# Patient Record
Sex: FEMALE | Race: WHITE | HISPANIC OR LATINO | Employment: OTHER | URBAN - METROPOLITAN AREA
[De-identification: names, ages, dates, MRNs, and addresses within clinical notes are randomized per-mention and may not be internally consistent; named-entity substitution may affect disease eponyms.]

---

## 2018-03-30 ENCOUNTER — OFFICE VISIT (OUTPATIENT)
Dept: OBGYN CLINIC | Age: 48
End: 2018-03-30
Payer: COMMERCIAL

## 2018-03-30 VITALS
BODY MASS INDEX: 21.21 KG/M2 | HEIGHT: 66 IN | SYSTOLIC BLOOD PRESSURE: 112 MMHG | DIASTOLIC BLOOD PRESSURE: 62 MMHG | WEIGHT: 132 LBS

## 2018-03-30 DIAGNOSIS — Z01.411 ENCOUNTER FOR GYNECOLOGICAL EXAMINATION (GENERAL) (ROUTINE) WITH ABNORMAL FINDINGS: ICD-10-CM

## 2018-03-30 DIAGNOSIS — Z80.3 FAMILY HISTORY OF BREAST CANCER: ICD-10-CM

## 2018-03-30 DIAGNOSIS — B00.9 HSV (HERPES SIMPLEX VIRUS) INFECTION: Primary | ICD-10-CM

## 2018-03-30 DIAGNOSIS — Z12.31 ENCOUNTER FOR SCREENING MAMMOGRAM FOR MALIGNANT NEOPLASM OF BREAST: ICD-10-CM

## 2018-03-30 PROBLEM — Z87.42 HISTORY OF ABNORMAL CERVICAL PAP SMEAR: Status: ACTIVE | Noted: 2018-03-30

## 2018-03-30 PROBLEM — F32.9 MDD (MAJOR DEPRESSIVE DISORDER): Status: ACTIVE | Noted: 2018-03-30

## 2018-03-30 PROBLEM — F43.10 PTSD (POST-TRAUMATIC STRESS DISORDER): Status: ACTIVE | Noted: 2018-03-30

## 2018-03-30 PROCEDURE — 87624 HPV HI-RISK TYP POOLED RSLT: CPT | Performed by: NURSE PRACTITIONER

## 2018-03-30 PROCEDURE — S0610 ANNUAL GYNECOLOGICAL EXAMINA: HCPCS | Performed by: NURSE PRACTITIONER

## 2018-03-30 PROCEDURE — G0145 SCR C/V CYTO,THINLAYER,RESCR: HCPCS | Performed by: NURSE PRACTITIONER

## 2018-03-30 RX ORDER — ACYCLOVIR 50 MG/G
OINTMENT TOPICAL
COMMUNITY
End: 2018-03-30 | Stop reason: SDUPTHER

## 2018-03-30 RX ORDER — PANTOPRAZOLE SODIUM 40 MG/1
40 TABLET, DELAYED RELEASE ORAL DAILY
Refills: 0 | COMMUNITY
Start: 2018-02-13 | End: 2018-06-13 | Stop reason: HOSPADM

## 2018-03-30 RX ORDER — CLONAZEPAM 1 MG/1
1 TABLET ORAL 3 TIMES DAILY
Refills: 0 | COMMUNITY
Start: 2018-02-19

## 2018-03-30 RX ORDER — DRONABINOL 2.5 MG/1
2.5 CAPSULE ORAL 2 TIMES DAILY
Refills: 0 | COMMUNITY
Start: 2018-01-26 | End: 2018-08-02 | Stop reason: SDUPTHER

## 2018-03-30 RX ORDER — PROPRANOLOL HYDROCHLORIDE 10 MG/1
10 TABLET ORAL DAILY
Refills: 3 | COMMUNITY
Start: 2018-02-22 | End: 2018-06-13 | Stop reason: HOSPADM

## 2018-03-30 RX ORDER — CITALOPRAM 40 MG/1
40 TABLET ORAL DAILY
Refills: 0 | COMMUNITY
Start: 2018-02-13 | End: 2021-08-16

## 2018-03-30 RX ORDER — ACYCLOVIR 800 MG/1
800 TABLET ORAL 2 TIMES DAILY
COMMUNITY
End: 2018-03-30 | Stop reason: SDUPTHER

## 2018-03-30 RX ORDER — DICLOFENAC 35 MG/1
CAPSULE ORAL
COMMUNITY
End: 2018-03-30 | Stop reason: CLARIF

## 2018-03-30 RX ORDER — AMLODIPINE BESYLATE 5 MG/1
5 TABLET ORAL DAILY
Refills: 0 | COMMUNITY
Start: 2018-03-20

## 2018-03-30 NOTE — PATIENT INSTRUCTIONS
Genital Herpes Simplex   AMBULATORY CARE:   Genital herpes  is a sexually transmitted infection (STI) that is caused by the herpes simplex virus (HSV)  It is spread through oral, vaginal, or anal sex  It may be spread even if you do not see blisters  It can also be spread to other areas of your body, including your eyes, by touching open blisters  If you are pregnant, it may be spread to your baby while he is still in your womb or during vaginal delivery  Unprotected sex or sex with multiple partners increases your risk for genital herpes  Common symptoms include the following: The most common symptoms are blisters that appear on your genital area, thighs, or buttocks  The blisters will open, leak fluid, and then dry up (crust over)  Usually these sores will go away without leaving a scar  Other symptoms may include any of the following:  · Redness, burning, itching, or tingling in your genital area    · Fever or chills    · Headache, body weakness, or muscle pains    · Swollen lymph nodes in your groin    · Sore throat or loss of appetite    · Fluid or blood leaking from your vagina    · Pain when you urinate  Call 911 for any of the following:   · You have trouble breathing  · You have a seizure  · Your neck is stiff  · You have trouble thinking clearly  Contact your healthcare provider if:   · You have chills or a fever  · You have painful blisters on your penis, vagina, anus, or mouth  · Fluid or blood is coming out of your genitals  · You have trouble urinating  · You think you are pregnant and you are bleeding from your vagina  · You have trouble chewing or swallowing  · Your symptoms do not get better, or they get worse, even after treatment  · You have questions or concerns about your condition or care  Medicines: There is no cure for genital herpes  You may need any of the following:  · Antivirals  may help decrease your symptoms       · Numbing cream or ointment  may help decrease pain  · NSAIDs , such as ibuprofen, help decrease swelling, pain, and fever  This medicine is available with or without a doctor's order  NSAIDs can cause stomach bleeding or kidney problems in certain people  If you take blood thinner medicine, always ask your healthcare provider if NSAIDs are safe for you  Always read the medicine label and follow directions  Manage your symptoms:  Do the following to be more comfortable when your infection is active:  · Keep the blisters clean and dry  Wash them with soap and warm water, and pat dry gently  · Wear cotton underwear and loose clothing  This may help to keep the blisters dry and keep clothes from rubbing  · Apply ice  on the area for 15 to 20 minutes every hour or as directed  Use an ice pack, or put crushed ice in a plastic bag  Cover it with a towel  Ice helps prevent tissue damage and decreases swelling and pain  · Apply heat  on the area for 20 to 30 minutes every 2 hours for as many days as directed  A warm bath may also help  Heat helps decrease pain and muscle spasms  Prevent the spread of genital herpes:   · Use condoms  Use a latex condom when you have oral, genital, and anal sex  Use a new condom each time  Use a polyurethane condom if you are allergic to latex  · Try not to touch your blisters  Wash your hands before and after you touch the area  Do not kiss anyone if you have blisters around your mouth  Do not breastfeed if you have blisters on your breast      · Tell your partners  that you have genital herpes  Do not have sex until he or she knows that you have genital herpes  Ask your healthcare provider for ways to tell partners about your infection  · Tell your healthcare providers  that you have genital herpes  If you are pregnant, your baby may need special monitoring  Inform your healthcare provider of your condition to avoid spreading the infection to your baby    Follow up with your healthcare provider as directed:  Write down your questions so you remember to ask them during your visits  © 2017 [employer] Information is for End User's use only and may not be sold, redistributed or otherwise used for commercial purposes  All illustrations and images included in CareNotes® are the copyrighted property of [employer] M [employer]  or [employer]  The above information is an  only  It is not intended as medical advice for individual conditions or treatments  Talk to your doctor, nurse or pharmacist before following any medical regimen to see if it is safe and effective for you

## 2018-03-30 NOTE — PROGRESS NOTES
Assessment/Plan:    No problem-specific Assessment & Plan notes found for this encounter  Diagnoses and all orders for this visit:    Encounter for gynecological examination (general) (routine) with abnormal findings  -     Liquid-based pap, screening    Family history of breast cancer  -     Ambulatory referral to Surgical Oncology    HSV (herpes simplex virus) infection    Encounter for screening mammogram for malignant neoplasm of breast  -     Mammo screening bilateral w 3d & cad; Future    Other orders  -     amLODIPine (NORVASC) 5 mg tablet; Take 5 mg by mouth daily  -     citalopram (CeleXA) 40 mg tablet; Take 40 mg by mouth daily  -     clonazePAM (KlonoPIN) 1 mg tablet; Take 1 mg by mouth 3 (three) times a day  -     pantoprazole (PROTONIX) 40 mg tablet; Take 40 mg by mouth daily  -     propranolol (INDERAL) 10 mg tablet; Take 10 mg by mouth daily  -     dronabinol (MARINOL) 2 5 mg capsule; Take 2 5 mg by mouth 2 (two) times a day  -     Discontinue: Diclofenac (ZORVOLEX) 35 MG CAPS; Take by mouth  -     acyclovir (ZOVIRAX) 5 % ointment; Apply topically every 3 (three) hours  -     acyclovir (ZOVIRAX) 800 mg tablet; Take 800 mg by mouth 2 (two) times a day          Subjective:      Patient ID: Neo Quijano is a 49 y o  female  Pleasant 49 y o  premenopausal female here for annual exam  She denies any issues with vaginal bleeding, had total hysterectomy in 2014  + history of abnormal pap smears with LEEP in 2008  Reports frequent HSV outbreaks d/t high stress level, involved in federal law suit at present time  Denies pelvic pain  Requests BRCA gene testing for mat aunt with breast cancer  Gynecologic Exam   Pertinent negatives include no chills, constipation, diarrhea, dysuria, fever or hematuria  The following portions of the patient's history were reviewed and updated as appropriate:   She  has a past medical history of Abnormal Pap smear of cervix;  Anxiety; MDD (major depressive disorder); and PTSD (post-traumatic stress disorder)  She   Patient Active Problem List    Diagnosis Date Noted    PTSD (post-traumatic stress disorder) 2018    MDD (major depressive disorder) 2018    HSV (herpes simplex virus) infection 2018    History of abnormal cervical Pap smear 2018    Family history of breast cancer 2018    Encounter for gynecological examination (general) (routine) with abnormal findings 2018     She  has a past surgical history that includes  section (); Partial hysterectomy (); and Cervical biopsy w/ loop electrode excision  Her family history includes Breast cancer in her maternal aunt; Heart disease in her paternal grandmother; Hypertension in her paternal grandmother; Lung cancer in her father; No Known Problems in her mother; Pancreatic cancer in her maternal grandmother; Uterine cancer in her maternal aunt  She  reports that she has never smoked  She has never used smokeless tobacco  She reports that she drinks alcohol  She reports that she does not use drugs  Current Outpatient Prescriptions   Medication Sig Dispense Refill    acyclovir (ZOVIRAX) 5 % ointment Apply topically every 3 (three) hours      acyclovir (ZOVIRAX) 800 mg tablet Take 800 mg by mouth 2 (two) times a day      amLODIPine (NORVASC) 5 mg tablet Take 5 mg by mouth daily  0    citalopram (CeleXA) 40 mg tablet Take 40 mg by mouth daily  0    clonazePAM (KlonoPIN) 1 mg tablet Take 1 mg by mouth 3 (three) times a day  0    dronabinol (MARINOL) 2 5 mg capsule Take 2 5 mg by mouth 2 (two) times a day  0    pantoprazole (PROTONIX) 40 mg tablet Take 40 mg by mouth daily  0    propranolol (INDERAL) 10 mg tablet Take 10 mg by mouth daily  3     No current facility-administered medications for this visit  No current outpatient prescriptions on file prior to visit  No current facility-administered medications on file prior to visit        She is allergic to lisinopril  OB History      Para Term  AB Living    4 2 2   1 2    SAB TAB Ectopic Multiple Live Births    1                28 yo son and 15 yo daughter, +step grands  Pt on disability PTSD    Review of Systems   Constitutional: Negative for chills, fatigue, fever and unexpected weight change  Respiratory: Negative for shortness of breath  Gastrointestinal: Negative for anal bleeding, blood in stool, constipation and diarrhea  Genitourinary: Negative for difficulty urinating, dysuria and hematuria  Objective:      /62 (BP Location: Right arm, Patient Position: Sitting)   Ht 5' 6" (1 676 m)   Wt 59 9 kg (132 lb)   Breastfeeding? No   BMI 21 31 kg/m²          Physical Exam   Constitutional: She appears well-developed and well-nourished  No distress  HENT:   Head: Normocephalic  Neck: Normal range of motion  Neck supple  Pulmonary/Chest: Effort normal  Right breast exhibits no inverted nipple, no mass, no nipple discharge, no skin change and no tenderness  Left breast exhibits no inverted nipple, no mass, no nipple discharge, no skin change and no tenderness  Breasts are symmetrical    Abdominal: Soft  Genitourinary: No breast swelling, tenderness, discharge or bleeding  Pelvic exam was performed with patient supine  No labial fusion  There is no rash, tenderness, lesion or injury on the right labia  There is no rash, tenderness, lesion or injury on the left labia  Cervix exhibits no motion tenderness, no discharge and no friability  Right adnexum displays no mass, no tenderness and no fullness  Left adnexum displays no mass, no tenderness and no fullness  No erythema or tenderness in the vagina  No foreign body in the vagina  No signs of injury around the vagina  No vaginal discharge found  Genitourinary Comments: Ovaries/uterus absent   Lymphadenopathy:        Right: No inguinal adenopathy present  Left: No inguinal adenopathy present

## 2018-04-02 RX ORDER — ACYCLOVIR 50 MG/G
OINTMENT TOPICAL EVERY 6 HOURS SCHEDULED
Qty: 15 G | Refills: 2 | Status: SHIPPED | OUTPATIENT
Start: 2018-04-02 | End: 2019-05-08 | Stop reason: SDUPTHER

## 2018-04-02 RX ORDER — ACYCLOVIR 800 MG/1
400 TABLET ORAL 2 TIMES DAILY
Qty: 30 TABLET | Refills: 6 | Status: SHIPPED | OUTPATIENT
Start: 2018-04-02 | End: 2018-05-02 | Stop reason: ALTCHOICE

## 2018-04-03 LAB — HPV RRNA GENITAL QL NAA+PROBE: NORMAL

## 2018-04-04 LAB
LAB AP GYN PRIMARY INTERPRETATION: NORMAL
Lab: NORMAL

## 2018-04-30 ENCOUNTER — OFFICE VISIT (OUTPATIENT)
Dept: CARDIOLOGY CLINIC | Facility: CLINIC | Age: 48
End: 2018-04-30
Payer: COMMERCIAL

## 2018-04-30 VITALS
HEART RATE: 60 BPM | WEIGHT: 128.6 LBS | BODY MASS INDEX: 20.67 KG/M2 | HEIGHT: 66 IN | SYSTOLIC BLOOD PRESSURE: 146 MMHG | OXYGEN SATURATION: 99 % | DIASTOLIC BLOOD PRESSURE: 98 MMHG

## 2018-04-30 DIAGNOSIS — I10 HYPERTENSION, UNSPECIFIED TYPE: ICD-10-CM

## 2018-04-30 DIAGNOSIS — R06.02 SOB (SHORTNESS OF BREATH): ICD-10-CM

## 2018-04-30 DIAGNOSIS — R07.89 CHEST TIGHTNESS: ICD-10-CM

## 2018-04-30 DIAGNOSIS — Z12.31 ENCOUNTER FOR SCREENING MAMMOGRAM FOR MALIGNANT NEOPLASM OF BREAST: ICD-10-CM

## 2018-04-30 DIAGNOSIS — R00.1 BRADYCARDIA: Primary | ICD-10-CM

## 2018-04-30 PROBLEM — R07.9 CHEST PAIN: Status: ACTIVE | Noted: 2018-04-30

## 2018-04-30 PROCEDURE — 99243 OFF/OP CNSLTJ NEW/EST LOW 30: CPT | Performed by: INTERNAL MEDICINE

## 2018-04-30 RX ORDER — TRAZODONE HYDROCHLORIDE 100 MG/1
50 TABLET ORAL
COMMUNITY

## 2018-04-30 NOTE — LETTER
April 30, 2018     MD [address]    Patient: Emil Evans   YOB: 1970   Date of Visit: 4/30/2018       Dear Dr Gertrude Medel:    Thank you for referring Emil Evans to me for evaluation  Below are my notes for this consultation  If you have questions, please do not hesitate to call me  I look forward to following your patient along with you  Sincerely,        Yovanny Thayer MD        CC: No Recipients  Fawn Umana  4/30/2018  3:40 PM  Attested  [facility] [id number]  Cardiology Consultation     Emil Evans  [id number]  1970      1  Bradycardia  Echo stress test w contrast if indicated    Holter monitor - 24 hour   2  Encounter for screening mammogram for malignant neoplasm of breast  Mammo screening bilateral w 3d & cad   3  Hypertension, unspecified type     4  Chest pain, unspecified type  Echo stress test w contrast if indicated   5  SOB (shortness of breath)  Echo stress test w contrast if indicated   6  Chest tightness         Chief complaint: tiredness, chest tightness    HPI:  Patient with history of HTN presents with bradycardic episodes for the past few months  Admits to lightheadedness and fatigue  Also states that she randomly has diffuse chest tightness/pressure and random episodes of SOB, lasting a few minutes  She had one episode of HR in the 20s when undergoing endoscopy/colonoscopy  States that she is sluggish in the morning and her HR is in the 40s and 50s when she wakes up  Sister had stroke at age 52        Patient Active Problem List   Diagnosis    PTSD (post-traumatic stress disorder)    MDD (major depressive disorder)    HSV (herpes simplex virus) infection    History of abnormal cervical Pap smear    Family history of breast cancer    Encounter for gynecological examination (general) (routine) with abnormal findings     Past Medical History: Diagnosis Date    Abnormal Pap smear of cervix     Anxiety     MDD (major depressive disorder)     PTSD (post-traumatic stress disorder)      Social History     Social History    Marital status: /Civil Union     Spouse name: N/A    Number of children: N/A    Years of education: N/A     Occupational History    Not on file       Social History Main Topics    Smoking status: Never Smoker    Smokeless tobacco: Never Used    Alcohol use Yes      Comment: Occasionally    Drug use: No    Sexual activity: Yes     Birth control/ protection: Surgical     Other Topics Concern    Not on file     Social History Narrative    No narrative on file      Family History   Problem Relation Age of Onset    No Known Problems Mother     Lung cancer Father     Pancreatic cancer Maternal Grandmother     Heart disease Paternal Grandmother     Hypertension Paternal Grandmother     Breast cancer Maternal Aunt     Uterine cancer Maternal Aunt     Colon cancer Neg Hx     Ovarian cancer Neg Hx     Cervical cancer Neg Hx      Past Surgical History:   Procedure Laterality Date    CERVICAL BIOPSY  W/ LOOP ELECTRODE EXCISION       SECTION  2013    PARTIAL HYSTERECTOMY         Current Outpatient Prescriptions:     citalopram (CeleXA) 40 mg tablet, Take 40 mg by mouth daily, Disp: , Rfl: 0    dronabinol (MARINOL) 2 5 mg capsule, Take 2 5 mg by mouth 2 (two) times a day, Disp: , Rfl: 0    pantoprazole (PROTONIX) 40 mg tablet, Take 40 mg by mouth daily, Disp: , Rfl: 0    acyclovir (ZOVIRAX) 5 % ointment, Apply topically every 6 (six) hours, Disp: 15 g, Rfl: 2    acyclovir (ZOVIRAX) 800 mg tablet, Take 0 5 tablets (400 mg total) by mouth 2 (two) times a day for 30 days, Disp: 30 tablet, Rfl: 6    amLODIPine (NORVASC) 5 mg tablet, Take 5 mg by mouth daily, Disp: , Rfl: 0    clonazePAM (KlonoPIN) 1 mg tablet, Take 1 mg by mouth 3 (three) times a day, Disp: , Rfl: 0    propranolol (INDERAL) 10 mg tablet, Take 10 mg by mouth daily, Disp: , Rfl: 3  Allergies   Allergen Reactions    Lisinopril Angioedema     There were no vitals filed for this visit  Labs:  Office Visit on 03/30/2018   Component Date Value    Case Report 03/30/2018                      Value:Gynecologic Cytology Report                       Case: [id number]                                  Authorizing Provider:  LEIDA Oconnor       Collected:           03/30/2018 0813              Ordering Location:     [facility]      Received:            03/30/2018 08                                     [facility]                                                                  First Screen:          ELVIA El                                                    Specimen:    LIQUID-BASED PAP, SCREENING, Cervix                                                        Primary Interpretation 03/30/2018 Negative for intraepithelial lesion or malignancy     Specimen Adequacy 03/30/2018 Satisfactory for evaluation  Endocervical/transformation zone component present   High Risk HPV Result 03/30/2018                      Value:HPV, High Risk: HPV NEG, HPV16 NEG, HPV18 NEG      Other High Risk HPV Negative, HPV 16 Negative, HPV 18 Negative  HPV types: 16,18,31,33,35,39,45,51,52,56,58,59,66 and 68 DNA are undetectable or below the pre-set threshold  [employer]s FDA approved Willie 4800 is utilized with strict adherence to the s instruction  manual to test for the presence of High-Risk HPV DNA, as well as HPV 16 and HPV 18  This instrument  has been validated by our laboratory and/or by the   A negative result does not preclude the presence of HPV infection because results depend on adequate  specimen collection, absence of inhibitors and sufficient DNA to be detected   Additionally, HPV negative  results are not intended to prevent women from proceeding to colposcopy if clinically warranted  Positive HPV test results indicate the presence of any one or more of the high risk types, but since patients  are often co-infected with low-risk types it does not rule out the presence of low-risk                           types in patients  with mixed infections   Additional Information 03/30/2018                      Value: This result contains rich text formatting which cannot be displayed here   HPV, High Risk 03/30/2018 HPV NEG, HPV16 NEG, HPV18 NEG      Imaging: No results found  Review of Systems:  Review of Systems   Constitutional: Positive for fatigue  Negative for diaphoresis and fever  HENT: Negative for congestion, ear discharge, hearing loss, sinus pain and sore throat  Eyes: Negative for pain, redness and visual disturbance  Respiratory: Positive for chest tightness and shortness of breath  Negative for cough and wheezing  Cardiovascular: Positive for chest pain  Negative for palpitations and leg swelling  Gastrointestinal: Negative for abdominal distention, abdominal pain, constipation, diarrhea, nausea and vomiting  Endocrine: Negative for cold intolerance and heat intolerance  Genitourinary: Negative for difficulty urinating and hematuria  Musculoskeletal: Negative for arthralgias, back pain, gait problem, joint swelling, myalgias, neck pain and neck stiffness  Skin: Negative for color change, pallor, rash and wound  Neurological: Positive for light-headedness  Negative for dizziness, syncope, weakness, numbness and headaches  Hematological: Does not bruise/bleed easily  Psychiatric/Behavioral: Negative for agitation, behavioral problems and confusion  The patient is not nervous/anxious          /98   Pulse 60   Ht 5' 6" (1 676 m)   Wt 58 3 kg (128 lb 9 6 oz)   SpO2 99%   BMI 20 76 kg/m²      Physical Exam:  Physical Exam   Constitutional: She is oriented to person, place, and time  She appears well-developed and well-nourished  HENT:   Head: Normocephalic and atraumatic  Eyes: Conjunctivae and EOM are normal  Pupils are equal, round, and reactive to light  Neck: Normal range of motion  Neck supple  Cardiovascular: Normal rate, regular rhythm, normal heart sounds and intact distal pulses  Exam reveals no gallop and no friction rub  No murmur heard  Pulmonary/Chest: Effort normal and breath sounds normal  No respiratory distress  She has no wheezes  She has no rales  She exhibits no tenderness  Abdominal: Soft  Bowel sounds are normal  She exhibits no distension  There is no rebound  Musculoskeletal: Normal range of motion  She exhibits no edema  Neurological: She is alert and oriented to person, place, and time  She has normal reflexes  Skin: Skin is warm and dry  Psychiatric: She has a normal mood and affect  Her behavior is normal  Judgment and thought content normal        EKG: Sinus bradycardia, HR 51    Discussion/Summary:  1  Bradycardia:  -Had stress test within the last year in [place], had exertional chest tightness but no ischemic changes noted on EKG  -EKG shows sinus bradycardia, HR 51  -Symptomatic- lightheaded, tired  -Will order ECHO stress test to r/o coronary ischemia, assess EF and regional wall motion abnormalities  -Will do holter monitor for 24 hours    2  Chest pain, SOB:  -Stress test within last year unremarkable  -EKG in office unremarkable, sinus bradycardia  -Will order ECHO stress test as above    3  HTN: /98  Continue present regimen  F/u 1 month    Attestation signed by Luz Flores MD at 4/30/2018  3:40 PM:  Split/Shared Statement  I saw/examined the patient  I agree with the Advanced Practitioner's note with the following additions/exceptions:  Chest pain is concerning for ischemia, I will get stress echocardiogram to rule out ischemia and structural heart disease    Shortness of breath and fatigue could be due to chronotropic incompetence from significant bradycardia  I will get Holter monitor to evaluate significant bradycardia

## 2018-04-30 NOTE — PROGRESS NOTES
[facility] [place] [facility] [address]  [facility] [address]  Cardiology Consultation     Curtis Mckoy  [id number]  1970      1  Bradycardia  Echo stress test w contrast if indicated    Holter monitor - 24 hour   2  Encounter for screening mammogram for malignant neoplasm of breast  Mammo screening bilateral w 3d & cad   3  Hypertension, unspecified type     4  Chest pain, unspecified type  Echo stress test w contrast if indicated   5  SOB (shortness of breath)  Echo stress test w contrast if indicated   6  Chest tightness         Chief complaint: tiredness, chest tightness    HPI:  Patient with history of HTN presents with bradycardic episodes for the past few months  Admits to lightheadedness and fatigue  Also states that she randomly has diffuse chest tightness/pressure and random episodes of SOB, lasting a few minutes  She had one episode of HR in the 20s when undergoing endoscopy/colonoscopy  States that she is sluggish in the morning and her HR is in the 40s and 50s when she wakes up  Sister had stroke at age 52  Patient Active Problem List   Diagnosis    PTSD (post-traumatic stress disorder)    MDD (major depressive disorder)    HSV (herpes simplex virus) infection    History of abnormal cervical Pap smear    Family history of breast cancer    Encounter for gynecological examination (general) (routine) with abnormal findings     Past Medical History:   Diagnosis Date    Abnormal Pap smear of cervix     Anxiety     MDD (major depressive disorder)     PTSD (post-traumatic stress disorder)      Social History     Social History    Marital status: /Civil Union     Spouse name: N/A    Number of children: N/A    Years of education: N/A     Occupational History    Not on file       Social History Main Topics    Smoking status: Never Smoker    Smokeless tobacco: Never Used    Alcohol use Yes      Comment: Occasionally    Drug use: No    Sexual activity: Yes Birth control/ protection: Surgical     Other Topics Concern    Not on file     Social History Narrative    No narrative on file      Family History   Problem Relation Age of Onset    No Known Problems Mother     Lung cancer Father     Pancreatic cancer Maternal Grandmother     Heart disease Paternal Grandmother     Hypertension Paternal Grandmother     Breast cancer Maternal Aunt     Uterine cancer Maternal Aunt     Colon cancer Neg Hx     Ovarian cancer Neg Hx     Cervical cancer Neg Hx      Past Surgical History:   Procedure Laterality Date    CERVICAL BIOPSY  W/ LOOP ELECTRODE EXCISION       SECTION      PARTIAL HYSTERECTOMY         Current Outpatient Prescriptions:     citalopram (CeleXA) 40 mg tablet, Take 40 mg by mouth daily, Disp: , Rfl: 0    dronabinol (MARINOL) 2 5 mg capsule, Take 2 5 mg by mouth 2 (two) times a day, Disp: , Rfl: 0    pantoprazole (PROTONIX) 40 mg tablet, Take 40 mg by mouth daily, Disp: , Rfl: 0    acyclovir (ZOVIRAX) 5 % ointment, Apply topically every 6 (six) hours, Disp: 15 g, Rfl: 2    acyclovir (ZOVIRAX) 800 mg tablet, Take 0 5 tablets (400 mg total) by mouth 2 (two) times a day for 30 days, Disp: 30 tablet, Rfl: 6    amLODIPine (NORVASC) 5 mg tablet, Take 5 mg by mouth daily, Disp: , Rfl: 0    clonazePAM (KlonoPIN) 1 mg tablet, Take 1 mg by mouth 3 (three) times a day, Disp: , Rfl: 0    propranolol (INDERAL) 10 mg tablet, Take 10 mg by mouth daily, Disp: , Rfl: 3  Allergies   Allergen Reactions    Lisinopril Angioedema     There were no vitals filed for this visit      Labs:  Office Visit on 2018   Component Date Value    Case Report 2018                      Value:Gynecologic Cytology Report                       Case: [id number]                                  Authorizing Provider:  LEIDA Dallas       Collected:           2018 0813              Ordering Location:     [facility]      Received: 03/30/2018 0813                                     [facility]                                                                  First Screen:          Bong Molina, CT                                                    Specimen:    LIQUID-BASED PAP, SCREENING, Cervix                                                        Primary Interpretation 03/30/2018 Negative for intraepithelial lesion or malignancy     Specimen Adequacy 03/30/2018 Satisfactory for evaluation  Endocervical/transformation zone component present   High Risk HPV Result 03/30/2018                      Value:HPV, High Risk: HPV NEG, HPV16 NEG, HPV18 NEG      Other High Risk HPV Negative, HPV 16 Negative, HPV 18 Negative  HPV types: 16,18,31,33,35,39,45,51,52,56,58,59,66 and 68 DNA are undetectable or below the pre-set threshold  [employer]s FDA approved Willie 4800 is utilized with strict adherence to the s instruction  manual to test for the presence of High-Risk HPV DNA, as well as HPV 16 and HPV 18  This instrument  has been validated by our laboratory and/or by the   A negative result does not preclude the presence of HPV infection because results depend on adequate  specimen collection, absence of inhibitors and sufficient DNA to be detected  Additionally, HPV negative  results are not intended to prevent women from proceeding to colposcopy if clinically warranted  Positive HPV test results indicate the presence of any one or more of the high risk types, but since patients  are often co-infected with low-risk types it does not rule out the presence of low-risk                           types in patients  with mixed infections   Additional Information 03/30/2018                      Value: This result contains rich text formatting which cannot be displayed here      HPV, High Risk 03/30/2018 HPV NEG, HPV16 NEG, HPV18 NEG      Imaging: No results found  Review of Systems:  Review of Systems   Constitutional: Positive for fatigue  Negative for diaphoresis and fever  HENT: Negative for congestion, ear discharge, hearing loss, sinus pain and sore throat  Eyes: Negative for pain, redness and visual disturbance  Respiratory: Positive for chest tightness and shortness of breath  Negative for cough and wheezing  Cardiovascular: Positive for chest pain  Negative for palpitations and leg swelling  Gastrointestinal: Negative for abdominal distention, abdominal pain, constipation, diarrhea, nausea and vomiting  Endocrine: Negative for cold intolerance and heat intolerance  Genitourinary: Negative for difficulty urinating and hematuria  Musculoskeletal: Negative for arthralgias, back pain, gait problem, joint swelling, myalgias, neck pain and neck stiffness  Skin: Negative for color change, pallor, rash and wound  Neurological: Positive for light-headedness  Negative for dizziness, syncope, weakness, numbness and headaches  Hematological: Does not bruise/bleed easily  Psychiatric/Behavioral: Negative for agitation, behavioral problems and confusion  The patient is not nervous/anxious  /98   Pulse 60   Ht 5' 6" (1 676 m)   Wt 58 3 kg (128 lb 9 6 oz)   SpO2 99%   BMI 20 76 kg/m²     Physical Exam:  Physical Exam   Constitutional: She is oriented to person, place, and time  She appears well-developed and well-nourished  HENT:   Head: Normocephalic and atraumatic  Eyes: Conjunctivae and EOM are normal  Pupils are equal, round, and reactive to light  Neck: Normal range of motion  Neck supple  Cardiovascular: Normal rate, regular rhythm, normal heart sounds and intact distal pulses  Exam reveals no gallop and no friction rub  No murmur heard  Pulmonary/Chest: Effort normal and breath sounds normal  No respiratory distress  She has no wheezes  She has no rales   She exhibits no tenderness  Abdominal: Soft  Bowel sounds are normal  She exhibits no distension  There is no rebound  Musculoskeletal: Normal range of motion  She exhibits no edema  Neurological: She is alert and oriented to person, place, and time  She has normal reflexes  Skin: Skin is warm and dry  Psychiatric: She has a normal mood and affect  Her behavior is normal  Judgment and thought content normal        EKG: Sinus bradycardia, HR 51    Discussion/Summary:  1  Bradycardia:  -Had stress test within the last year in [place], had exertional chest tightness but no ischemic changes noted on EKG  -EKG shows sinus bradycardia, HR 51  -Symptomatic- lightheaded, tired  -Will order ECHO stress test to r/o coronary ischemia, assess EF and regional wall motion abnormalities  -Will do holter monitor for 24 hours    2  Chest pain, SOB:  -Stress test within last year unremarkable  -EKG in office unremarkable, sinus bradycardia  -Will order ECHO stress test as above    3  HTN: /98  Continue present regimen      F/u 1 month

## 2018-05-25 ENCOUNTER — HOSPITAL ENCOUNTER (OUTPATIENT)
Dept: NON INVASIVE DIAGNOSTICS | Facility: CLINIC | Age: 48
Discharge: HOME | End: 2018-05-25
Payer: COMMERCIAL

## 2018-05-25 DIAGNOSIS — R00.1 BRADYCARDIA: ICD-10-CM

## 2018-05-25 DIAGNOSIS — R06.02 SOB (SHORTNESS OF BREATH): ICD-10-CM

## 2018-05-25 PROCEDURE — 93350 STRESS TTE ONLY: CPT

## 2018-05-25 PROCEDURE — 93226 XTRNL ECG REC<48 HR SCAN A/R: CPT

## 2018-05-25 PROCEDURE — 93227 XTRNL ECG REC<48 HR R&I: CPT | Performed by: INTERNAL MEDICINE

## 2018-05-25 PROCEDURE — 93225 XTRNL ECG REC<48 HRS REC: CPT

## 2018-05-25 PROCEDURE — 93351 STRESS TTE COMPLETE: CPT | Performed by: INTERNAL MEDICINE

## 2018-05-29 LAB
MAX DIASTOLIC BP: 88 MMHG
MAX HEART RATE: 148 BPM
MAX PREDICTED HEART RATE: 172 BPM
MAX. SYSTOLIC BP: 174 MMHG
PROTOCOL NAME: NORMAL
TARGET HR FORMULA: NORMAL
TEST INDICATION: NORMAL
TIME IN EXERCISE PHASE: NORMAL

## 2018-05-31 ENCOUNTER — TELEPHONE (OUTPATIENT)
Dept: CARDIOLOGY CLINIC | Facility: CLINIC | Age: 48
End: 2018-05-31

## 2018-05-31 NOTE — TELEPHONE ENCOUNTER
----- Message from Hortencia Kennedy PA-C sent at 5/31/2018 11:42 AM EDT -----  Holter monitor overall fine, please let patient know

## 2018-06-13 ENCOUNTER — OFFICE VISIT (OUTPATIENT)
Dept: CARDIOLOGY CLINIC | Facility: CLINIC | Age: 48
End: 2018-06-13
Payer: COMMERCIAL

## 2018-06-13 VITALS
HEIGHT: 66 IN | HEART RATE: 78 BPM | BODY MASS INDEX: 21.41 KG/M2 | WEIGHT: 133.2 LBS | OXYGEN SATURATION: 98 % | SYSTOLIC BLOOD PRESSURE: 128 MMHG | DIASTOLIC BLOOD PRESSURE: 88 MMHG

## 2018-06-13 DIAGNOSIS — I10 ESSENTIAL HYPERTENSION: ICD-10-CM

## 2018-06-13 DIAGNOSIS — R07.9 CHEST PAIN, UNSPECIFIED TYPE: ICD-10-CM

## 2018-06-13 DIAGNOSIS — R00.1 BRADYCARDIA: Primary | ICD-10-CM

## 2018-06-13 PROCEDURE — 99213 OFFICE O/P EST LOW 20 MIN: CPT | Performed by: INTERNAL MEDICINE

## 2018-08-02 ENCOUNTER — OFFICE VISIT (OUTPATIENT)
Dept: INTERNAL MEDICINE CLINIC | Facility: CLINIC | Age: 48
End: 2018-08-02
Payer: COMMERCIAL

## 2018-08-02 VITALS
SYSTOLIC BLOOD PRESSURE: 118 MMHG | OXYGEN SATURATION: 100 % | WEIGHT: 129.4 LBS | HEIGHT: 66 IN | HEART RATE: 60 BPM | DIASTOLIC BLOOD PRESSURE: 80 MMHG | BODY MASS INDEX: 20.79 KG/M2

## 2018-08-02 DIAGNOSIS — F33.41 RECURRENT MAJOR DEPRESSIVE DISORDER, IN PARTIAL REMISSION (HCC): ICD-10-CM

## 2018-08-02 DIAGNOSIS — I10 ESSENTIAL HYPERTENSION: Primary | ICD-10-CM

## 2018-08-02 DIAGNOSIS — F41.9 ANXIETY: ICD-10-CM

## 2018-08-02 DIAGNOSIS — F43.10 PTSD (POST-TRAUMATIC STRESS DISORDER): ICD-10-CM

## 2018-08-02 DIAGNOSIS — K21.9 GASTROESOPHAGEAL REFLUX DISEASE WITHOUT ESOPHAGITIS: ICD-10-CM

## 2018-08-02 DIAGNOSIS — R00.1 BRADYCARDIA: ICD-10-CM

## 2018-08-02 PROBLEM — R06.02 SOB (SHORTNESS OF BREATH): Status: RESOLVED | Noted: 2018-04-30 | Resolved: 2018-08-02

## 2018-08-02 PROBLEM — Z01.411 ENCOUNTER FOR GYNECOLOGICAL EXAMINATION (GENERAL) (ROUTINE) WITH ABNORMAL FINDINGS: Status: RESOLVED | Noted: 2018-03-30 | Resolved: 2018-08-02

## 2018-08-02 PROBLEM — R07.89 CHEST TIGHTNESS: Status: RESOLVED | Noted: 2018-04-30 | Resolved: 2018-08-02

## 2018-08-02 PROBLEM — R07.9 CHEST PAIN: Status: RESOLVED | Noted: 2018-04-30 | Resolved: 2018-08-02

## 2018-08-02 PROCEDURE — 3008F BODY MASS INDEX DOCD: CPT | Performed by: INTERNAL MEDICINE

## 2018-08-02 PROCEDURE — 3074F SYST BP LT 130 MM HG: CPT | Performed by: INTERNAL MEDICINE

## 2018-08-02 PROCEDURE — 3079F DIAST BP 80-89 MM HG: CPT | Performed by: INTERNAL MEDICINE

## 2018-08-02 PROCEDURE — 1036F TOBACCO NON-USER: CPT | Performed by: INTERNAL MEDICINE

## 2018-08-02 PROCEDURE — 99204 OFFICE O/P NEW MOD 45 MIN: CPT | Performed by: INTERNAL MEDICINE

## 2018-08-02 RX ORDER — PANTOPRAZOLE SODIUM 40 MG/1
TABLET, DELAYED RELEASE ORAL
COMMUNITY
Start: 2017-10-31 | End: 2021-08-16

## 2018-08-02 RX ORDER — DRONABINOL 2.5 MG/1
2.5 CAPSULE ORAL 2 TIMES DAILY
Qty: 60 CAPSULE | Refills: 0 | Status: SHIPPED | OUTPATIENT
Start: 2018-08-02 | End: 2018-09-15 | Stop reason: SDUPTHER

## 2018-08-02 NOTE — PROGRESS NOTES
INTERNAL MEDICINE OFFICE VISIT  [facility] [address], [place], [address] [facility]  Tel: [phone]      NAME: Pop Wells  AGE: 51 y o  SEX: female  : 1970   MRN: [id number]    DATE: 2018  TIME: 4:18 PM      Assessment and Plan:  1  Essential hypertension  Blood pressure well controlled on amlodipine, continue the same    2  Gastroesophageal reflux disease without esophagitis  She takes pantoprazole as needed    3  Bradycardia  Stable    4  Recurrent major depressive disorder, in partial remission ([facility] [address] )  She takes her medications regularly and follows up with Psychiatry and the psychologist regularly    5  PTSD (post-traumatic stress disorder)  Continue medications    6  Anxiety  Continue medications      - Counseling Documentation: patient was counseled regarding: instructions for management, risk factor reductions, prognosis, patient and family education, impressions, risks and benefits of treatment options and importance of compliance with treatment  - Medication Side Effects: Adverse side effects of medications were reviewed with the patient/guardian today  Return for follow up visit in 4 months or earlier, if needed  Chief Complaint:  Chief Complaint   Patient presents with    Cyst     Right Elbow         History of Present Illness: This is a new patient was here to establish  Hypertension-blood pressure stable on amlodipine  GERD-she takes pantoprazole only as needed  Bradycardia-stable  Anxiety, depression and PTSD-she has a Federal law suit and has all the psychiatrist problems because of that  She follows up with the psychiatrist, psychotherapist regularly          Active Problem List:  Patient Active Problem List   Diagnosis    PTSD (post-traumatic stress disorder)    MDD (major depressive disorder)    HSV (herpes simplex virus) infection    History of abnormal cervical Pap smear    Family history of breast cancer    Bradycardia    Essential hypertension    Anxiety    Gastroesophageal reflux disease without esophagitis         Past Medical History:  Past Medical History:   Diagnosis Date    Abnormal Pap smear of cervix     Anxiety     Bradycardia     MDD (major depressive disorder)     PTSD (post-traumatic stress disorder)          Past Surgical History:  Past Surgical History:   Procedure Laterality Date    CERVICAL BIOPSY  W/ LOOP ELECTRODE EXCISION       SECTION  2013    PARTIAL HYSTERECTOMY  2014         Family History:  Family History   Problem Relation Age of Onset    No Known Problems Mother     Lung cancer Father     Pancreatic cancer Maternal Grandmother     Heart disease Paternal Grandmother     Hypertension Paternal Grandmother     Breast cancer Maternal Aunt     Uterine cancer Maternal Aunt     Colon cancer Neg Hx     Ovarian cancer Neg Hx     Cervical cancer Neg Hx          Social History:  Social History     Social History    Marital status: /Civil Union     Spouse name: N/A    Number of children: N/A    Years of education: N/A     Social History Main Topics    Smoking status: Never Smoker    Smokeless tobacco: Never Used    Alcohol use Yes      Comment: Occasionally    Drug use: No    Sexual activity: Yes     Birth control/ protection: Surgical     Other Topics Concern    None     Social History Narrative    None         Allergies:   Allergies   Allergen Reactions    Lisinopril Angioedema         Medications:    Current Outpatient Prescriptions:     acyclovir (ZOVIRAX) 5 % ointment, Apply topically every 6 (six) hours, Disp: 15 g, Rfl: 2    amLODIPine (NORVASC) 5 mg tablet, Take 5 mg by mouth daily, Disp: , Rfl: 0    citalopram (CeleXA) 40 mg tablet, Take 40 mg by mouth daily, Disp: , Rfl: 0    clonazePAM (KlonoPIN) 1 mg tablet, Take 1 mg by mouth 3 (three) times a day, Disp: , Rfl: 0    dronabinol (MARINOL) 2 5 mg capsule, Take 2 5 mg by mouth 2 (two) times a day, Disp: , Rfl: 0    pantoprazole (PROTONIX) 40 mg tablet, TAKE 1 TABLET BY MOUTH EVERY DAY, Disp: , Rfl:     traZODone (DESYREL) 100 mg tablet, Take 50 mg by mouth daily at bedtime as needed  , Disp: , Rfl:       The following portions of the patient's history were reviewed and updated as appropriate: past medical history, past surgical history, family history, social history, allergies, current medications and active problem list       Review of Systems:  Constitutional: Denies fever, chills, weight gain, weight loss, fatigue  Eyes: Denies eye redness, eye discharge, double vision, change in visual acuity  ENT: Denies hearing loss, tinnitus, sneezing, nasal congestion, nasal discharge, sore throat   Respiratory: Denies cough, expectoration, hemoptysis, shortness of breath, wheezing  Cardiovascular: Denies chest pain, palpitations, lower extremity swelling, orthopnea, PND  Gastrointestinal: Denies abdominal pain, heartburn, nausea, vomiting, hematemesis, diarrhea, bloody stools  Genito-Urinary: Denies dysuria, frequency, difficulty in micturition, nocturia, incontinence  Musculoskeletal: Denies back pain, joint pain, muscle pain  Neurologic: Denies confusion, lightheadedness, syncope, headache, focal weakness, sensory changes, seizures  Endocrine: Denies polyuria, polydipsia, temperature intolerance  Allergy and Immunology: Denies hives, insect bite sensitivity  Hematological and Lymphatic: Denies bleeding problems, swollen glands   Psychological: has depression,, anxiety,  Dermatological: Denies pruritus, rash, skin lesion changes      Vitals:  Vitals:    08/02/18 1531   BP: 118/80   Pulse: 60   SpO2: 100%       Body mass index is 20 89 kg/m²  Weight (last 2 days)     Date/Time   Weight    08/02/18 1531  58 7 (129 4)                Physical Examination:  General: Patient is not in acute distress  Awake, alert, responding to commands  No weight gain or loss  Head: Normocephalic   Atraumatic  Eyes: Conjunctiva and lids with no swelling, erythema or discharge  Both pupils normal sized, round and reactive to light  Sclera nonicteric  ENT: External examination of nose and ear normal  Otoscopic examination shows translucent tympanic membranes with patent canals without erythema  Oropharynx moist with no erythema, edema, exudate or lesions  Neck: Supple  JVP not raised  Trachea midline  No masses  No thyromegaly  Lungs: No signs of increased work of breathing or respiratory distress  Bilateral bronchovascular breath sounds with no crackles or rhonchi  Chest wall: No tenderness  Cardiovascular: Normal PMI  No thrills  Regular rate and rhythm  S1 and S2 normal  No murmur, rub or gallop  Gastrointestinal: Abdomen soft, nontender  No guarding or rigidity  Liver and spleen not palpable  Bowel sounds present  Neurologic: Cranial nerves II-XII intact   Cortical functions normal  Motor system - Reflexes 2+ and symmetrical  Sensations normal  Musculoskeletal: Gait normal  No joint tenderness  Integumentary: Skin normal with no rash or lesions  Lymphatic: No palpable lymph nodes in neck, axilla or groin  Extremities: No clubbing, cyanosis, edema or varicosities  Psychological: Judgement and insight normal  depressed      Laboratory Results:  CBC with diff:   No results found for: WBC, RBC, HGB, HCT, MCV, MCH, RDW, PLT    CMP:  No results found for: CREATININE, BUN, NA, K, CL, CO2, GLUCOSE, PROT, ALKPHOS, ALT, AST, BILIDIR    No results found for: HGBA1C, MG, PHOS    No results found for: TROPONINI, CKMB, CKTOTAL    Lipid Profile:   No results found for: CHOL  No results found for: HDL  No results found for: LDLCALC  No results found for: TRIG      Health Maintenance:  Health Maintenance   Topic Date Due    HIV SCREENING  1970    MAMMOGRAM  1970    INFLUENZA VACCINE  09/01/2018    PAP SMEAR  03/30/2021    DTaP,Tdap,and Td Vaccines (2 - Td) 11/29/2027       There is no immunization history on file for this patient        Debbi Wesley MD  8/2/2018,4:18 PM

## 2018-09-15 DIAGNOSIS — F43.10 PTSD (POST-TRAUMATIC STRESS DISORDER): ICD-10-CM

## 2018-09-17 RX ORDER — DRONABINOL 2.5 MG/1
CAPSULE ORAL
Qty: 60 CAPSULE | Refills: 0 | Status: SHIPPED | OUTPATIENT
Start: 2018-09-17 | End: 2018-11-06 | Stop reason: SDUPTHER

## 2018-11-06 DIAGNOSIS — F43.10 PTSD (POST-TRAUMATIC STRESS DISORDER): ICD-10-CM

## 2018-11-06 RX ORDER — DRONABINOL 2.5 MG/1
CAPSULE ORAL
Qty: 60 CAPSULE | Refills: 0 | Status: SHIPPED | OUTPATIENT
Start: 2018-11-06 | End: 2020-02-28 | Stop reason: SDUPTHER

## 2019-03-20 ENCOUNTER — OFFICE VISIT (OUTPATIENT)
Dept: OBGYN CLINIC | Facility: CLINIC | Age: 49
End: 2019-03-20
Payer: COMMERCIAL

## 2019-03-20 VITALS
DIASTOLIC BLOOD PRESSURE: 60 MMHG | WEIGHT: 124 LBS | BODY MASS INDEX: 19.93 KG/M2 | SYSTOLIC BLOOD PRESSURE: 102 MMHG | HEIGHT: 66 IN

## 2019-03-20 DIAGNOSIS — N64.4 BREAST PAIN: Primary | ICD-10-CM

## 2019-03-20 DIAGNOSIS — Z80.3 FAMILY HISTORY OF BREAST CANCER: ICD-10-CM

## 2019-03-20 PROCEDURE — 99213 OFFICE O/P EST LOW 20 MIN: CPT | Performed by: NURSE PRACTITIONER

## 2019-03-20 NOTE — PROGRESS NOTES
Assessment/Plan:    No problem-specific Assessment & Plan notes found for this encounter  Diagnoses and all orders for this visit:    Breast pain  -     Mammo diagnostic bilateral w 3d & cad; Future  -     US breast right complete (abus); Future    Family history of breast cancer      BRCA gene testing info given again per her request     Subjective:      Patient ID: Kevin Menchaca is a 51 y o  female  Pleasant 51 y o  here for breast lump which she noticed for the past week  She states it is associated with pain  Denies skin changes, nipple discharge, redness to the breast or fevers  She has family history of breast cancer with maternal aunt  She feels the lump is not changing  She denies excess caffeine use  She is not breastfeeding  Did not do mammogram or BRCA gene appointments ordered at last visit  She states she has trqansportation issues  The following portions of the patient's history were reviewed and updated as appropriate:   She  has a past medical history of Abnormal Pap smear of cervix, Anxiety, Bradycardia, MDD (major depressive disorder), and PTSD (post-traumatic stress disorder)  She   Patient Active Problem List    Diagnosis Date Noted    Breast pain 2019    Bradycardia 2018    Essential hypertension 2018    PTSD (post-traumatic stress disorder) 2018    MDD (major depressive disorder) 2018    HSV (herpes simplex virus) infection 2018    History of abnormal cervical Pap smear 2018    Family history of breast cancer 2018    Anxiety 2017    Gastroesophageal reflux disease without esophagitis 2017     She  has a past surgical history that includes  section (); Partial hysterectomy (); and Cervical biopsy w/ loop electrode excision  Her family history includes Breast cancer in her maternal aunt;  Heart disease in her paternal grandmother; Hypertension in her paternal grandmother; Lung cancer in her father; No Known Problems in her mother; Pancreatic cancer in her maternal grandmother; Uterine cancer in her maternal aunt  She  reports that she has never smoked  She has never used smokeless tobacco  She reports that she drinks alcohol  She reports that she has current or past drug history  Drug: Marijuana  Current Outpatient Medications   Medication Sig Dispense Refill    acyclovir (ZOVIRAX) 5 % ointment Apply topically every 6 (six) hours 15 g 2    amLODIPine (NORVASC) 5 mg tablet Take 5 mg by mouth daily  0    citalopram (CeleXA) 40 mg tablet Take 40 mg by mouth daily  0    clonazePAM (KlonoPIN) 1 mg tablet Take 1 mg by mouth 3 (three) times a day  0    pantoprazole (PROTONIX) 40 mg tablet TAKE 1 TABLET BY MOUTH EVERY DAY      traZODone (DESYREL) 100 mg tablet Take 50 mg by mouth daily at bedtime as needed        dronabinol (MARINOL) 2 5 mg capsule TAKE 1 CAPSULE BY MOUTH TWICE A DAY (Patient not taking: Reported on 3/20/2019) 60 capsule 0     No current facility-administered medications for this visit  She is allergic to lisinopril  OB History    Para Term  AB Living   4 2 2   1 2   SAB TAB Ectopic Multiple Live Births   1              # Outcome Date GA Lbr Joby/2nd Weight Sex Delivery Anes PTL Lv   4             3 SAB            2 Term            1 Term              PTSD hx     Review of Systems   Constitutional: Negative for activity change, chills, fatigue, fever and unexpected weight change  HENT: Negative for mouth sores and trouble swallowing  Respiratory: Negative for shortness of breath  Gastrointestinal: Negative for anal bleeding, blood in stool, constipation and diarrhea  Genitourinary: Negative for difficulty urinating, dysuria, genital sores and hematuria  Neurological: Negative for weakness  Psychiatric/Behavioral: Negative for confusion and self-injury           Objective:      /60 (BP Location: Right arm, Patient Position: Sitting)   Ht 5' 6" (1 676 m)   Wt 56 2 kg (124 lb)   Breastfeeding? No   BMI 20 01 kg/m²          Physical Exam   Constitutional: She appears well-developed and well-nourished  She is active and cooperative  No distress  Pulmonary/Chest: Effort normal  Right breast exhibits tenderness  Right breast exhibits no inverted nipple, no mass, no nipple discharge and no skin change  Left breast exhibits no inverted nipple, no mass, no nipple discharge, no skin change and no tenderness  No breast discharge or bleeding  Breasts are symmetrical    Right breast pain at 3 o'clock, no palpable lump appreciated, possible fullness  Genitourinary: No breast discharge or bleeding  Neurological: She is alert

## 2019-03-21 ENCOUNTER — HOSPITAL ENCOUNTER (OUTPATIENT)
Dept: ULTRASOUND IMAGING | Facility: CLINIC | Age: 49
Discharge: HOME | End: 2019-03-21
Payer: COMMERCIAL

## 2019-03-21 ENCOUNTER — HOSPITAL ENCOUNTER (OUTPATIENT)
Dept: MAMMOGRAPHY | Facility: CLINIC | Age: 49
Discharge: HOME | End: 2019-03-21
Payer: COMMERCIAL

## 2019-03-21 ENCOUNTER — TELEPHONE (OUTPATIENT)
Dept: INTERNAL MEDICINE CLINIC | Facility: CLINIC | Age: 49
End: 2019-03-21

## 2019-03-21 VITALS — WEIGHT: 121 LBS | BODY MASS INDEX: 18.99 KG/M2 | HEIGHT: 67 IN

## 2019-03-21 DIAGNOSIS — N64.4 BREAST PAIN: ICD-10-CM

## 2019-03-21 DIAGNOSIS — Z80.3 FAMILY HISTORY OF BREAST CANCER: Primary | ICD-10-CM

## 2019-03-21 PROCEDURE — 76642 ULTRASOUND BREAST LIMITED: CPT

## 2019-03-21 PROCEDURE — G0279 TOMOSYNTHESIS, MAMMO: HCPCS

## 2019-03-21 PROCEDURE — 77066 DX MAMMO INCL CAD BI: CPT

## 2019-03-21 NOTE — TELEPHONE ENCOUNTER
Patient called to speak to someone about the test she requested   Please contact Christina at  [phone]

## 2019-03-21 NOTE — TELEPHONE ENCOUNTER
Pt had a mammo and breast ultra sound done today for a suspicious lump  They informed her that tests were negative for malignancy however, she has a small hematoma on the side of her right breast and should follow up with Dr Colt Sandhu       Also, she is asking if we could order blood test to see if she has the gene for breast cancer       [phone]

## 2019-03-25 ENCOUNTER — CONSULT (OUTPATIENT)
Dept: GYNECOLOGIC ONCOLOGY | Facility: CLINIC | Age: 49
End: 2019-03-25
Payer: COMMERCIAL

## 2019-03-25 VITALS
BODY MASS INDEX: 19.57 KG/M2 | HEIGHT: 67 IN | SYSTOLIC BLOOD PRESSURE: 116 MMHG | DIASTOLIC BLOOD PRESSURE: 80 MMHG | TEMPERATURE: 98.7 F | WEIGHT: 124.7 LBS | HEART RATE: 82 BPM

## 2019-03-25 DIAGNOSIS — Z13.79 GENETIC TESTING: Primary | ICD-10-CM

## 2019-03-25 DIAGNOSIS — Z80.3 FAMILY HISTORY OF BREAST CANCER: ICD-10-CM

## 2019-03-25 DIAGNOSIS — Z80.41 FAMILY HISTORY OF OVARIAN CANCER: ICD-10-CM

## 2019-03-25 PROCEDURE — 99202 OFFICE O/P NEW SF 15 MIN: CPT | Performed by: NURSE PRACTITIONER

## 2019-03-25 NOTE — PROGRESS NOTES
Shravan Baltazar  1970  [facility]  [facility] [address] [address]  [phone]    Chief Complaint   Patient presents with    Consult     genetic testing        Assessment/Plan     Assessment:  1  Genetic testing     2  Family history of breast cancer     3  Family history of ovarian cancer       Cancer Staging  No matching staging information was found for the patient  History of Present Illness: This is a 50-year-old who presents to the office for genetic testing  She has a family history of breast cancer and ovarian cancer on her mother's side  She also has a family history of pancreatic cancer in her maternal grandmother, diagnosed at age 64  Her father has a history of multiple colon polyps (age 55)  No colon cancer, per the patient  Review of Systems   Constitutional: Negative for chills, fatigue, fever and unexpected weight change  HENT: Negative for nosebleeds  Eyes: Negative  Respiratory: Negative for cough, chest tightness, shortness of breath and wheezing  Cardiovascular: Negative for chest pain, palpitations and leg swelling  Gastrointestinal: Negative for abdominal distention, abdominal pain, anal bleeding, blood in stool, constipation, diarrhea, nausea, rectal pain and vomiting  Endocrine: Negative  Genitourinary: Negative for difficulty urinating, dysuria, frequency, hematuria, pelvic pain, urgency, vaginal bleeding, vaginal discharge and vaginal pain  Musculoskeletal: Negative for arthralgias and joint swelling  Skin: Negative for color change, pallor and rash  Neurological: Negative for dizziness, weakness, light-headedness, numbness and headaches  Hematological: Negative  Psychiatric/Behavioral: Negative          Past Medical History:   Diagnosis Date    Abnormal Pap smear of cervix     Anxiety     Bradycardia     MDD (major depressive disorder)     PTSD (post-traumatic stress disorder)        Past Surgical History:   Procedure Laterality Date    BREAST CYST EXCISION Left     benign    CERVICAL BIOPSY  W/ LOOP ELECTRODE EXCISION       SECTION  2013    HYSTERECTOMY  2015    OOPHORECTOMY Bilateral 2015    PARTIAL HYSTERECTOMY         OB History        4    Para   2    Term   2            AB   1    Living   2       SAB   1    TAB        Ectopic        Multiple        Live Births                     Family History   Problem Relation Age of Onset    No Known Problems Mother     Lung cancer Father     Colon polyps Father     Pancreatic cancer Maternal Grandmother     Heart disease Paternal Grandmother     Hypertension Paternal Grandmother     Breast cancer Maternal Aunt 39    Uterine cancer Maternal Aunt     Breast cancer Maternal Aunt 46    Colon cancer Neg Hx     Ovarian cancer Neg Hx     Cervical cancer Neg Hx        Social History     Socioeconomic History    Marital status: /Civil Union     Spouse name: Not on file    Number of children: Not on file    Years of education: Not on file    Highest education level: Not on file   Occupational History    Not on file   Social Needs    Financial resource strain: Not on file    Food insecurity:     Worry: Not on file     Inability: Not on file    Transportation needs:     Medical: Not on file     Non-medical: Not on file   Tobacco Use    Smoking status: Never Smoker    Smokeless tobacco: Never Used   Substance and Sexual Activity    Alcohol use: Yes     Frequency: Monthly or less     Comment: Occasionally    Drug use: Yes     Types: Marijuana     Comment: Medical Marijuana Card    Sexual activity: Yes     Birth control/protection: Surgical   Lifestyle    Physical activity:     Days per week: Not on file     Minutes per session: Not on file    Stress: Not on file   Relationships    Social connections:     Talks on phone: Not on file     Gets together: Not on file     Attends Synagogue service: Not on file     Active member of club or organization: Not on file     Attends meetings of clubs or organizations: Not on file     Relationship status: Not on file    Intimate partner violence:     Fear of current or ex partner: Not on file     Emotionally abused: Not on file     Physically abused: Not on file     Forced sexual activity: Not on file   Other Topics Concern    Not on file   Social History Narrative    Not on file         Current Outpatient Medications:     acyclovir (ZOVIRAX) 5 % ointment, Apply topically every 6 (six) hours, Disp: 15 g, Rfl: 2    amLODIPine (NORVASC) 5 mg tablet, Take 5 mg by mouth daily, Disp: , Rfl: 0    citalopram (CeleXA) 40 mg tablet, Take 40 mg by mouth daily, Disp: , Rfl: 0    clonazePAM (KlonoPIN) 1 mg tablet, Take 1 mg by mouth 3 (three) times a day, Disp: , Rfl: 0    dronabinol (MARINOL) 2 5 mg capsule, TAKE 1 CAPSULE BY MOUTH TWICE A DAY, Disp: 60 capsule, Rfl: 0    pantoprazole (PROTONIX) 40 mg tablet, TAKE 1 TABLET BY MOUTH EVERY DAY, Disp: , Rfl:     traZODone (DESYREL) 100 mg tablet, Take 50 mg by mouth daily at bedtime as needed  , Disp: , Rfl:     Allergies   Allergen Reactions    Lisinopril Angioedema       Physical Exam   Constitutional: She is oriented to person, place, and time  She appears well-developed and well-nourished  No distress  HENT:   Head: Normocephalic and atraumatic  Pulmonary/Chest: Effort normal  No respiratory distress  Musculoskeletal: Normal range of motion  Neurological: She is alert and oriented to person, place, and time  Skin: Skin is warm and dry  No rash noted  She is not diaphoretic  No erythema  No pallor  Psychiatric: She has a normal mood and affect   Her behavior is normal  Judgment and thought content normal

## 2019-04-02 ENCOUNTER — TELEPHONE (OUTPATIENT)
Dept: GYNECOLOGIC ONCOLOGY | Facility: CLINIC | Age: 49
End: 2019-04-02

## 2019-05-08 DIAGNOSIS — B00.9 HSV (HERPES SIMPLEX VIRUS) INFECTION: ICD-10-CM

## 2019-05-08 RX ORDER — ACYCLOVIR 50 MG/G
OINTMENT TOPICAL
Qty: 30 G | Refills: 0 | Status: SHIPPED | OUTPATIENT
Start: 2019-05-08 | End: 2020-04-14 | Stop reason: SDUPTHER

## 2019-10-18 DIAGNOSIS — F43.10 PTSD (POST-TRAUMATIC STRESS DISORDER): ICD-10-CM

## 2019-10-18 DIAGNOSIS — B00.9 HSV (HERPES SIMPLEX VIRUS) INFECTION: Primary | ICD-10-CM

## 2019-10-18 RX ORDER — DRONABINOL 2.5 MG/1
2.5 CAPSULE ORAL 2 TIMES DAILY
Qty: 60 CAPSULE | Refills: 0 | OUTPATIENT
Start: 2019-10-18

## 2019-10-18 RX ORDER — DRONABINOL 2.5 MG/1
CAPSULE ORAL
Qty: 60 CAPSULE | OUTPATIENT
Start: 2019-10-18

## 2019-10-18 RX ORDER — ACYCLOVIR 400 MG/1
400 TABLET ORAL 3 TIMES DAILY
Qty: 90 TABLET | Refills: 0 | Status: SHIPPED | OUTPATIENT
Start: 2019-10-18 | End: 2019-11-17 | Stop reason: SDUPTHER

## 2019-11-17 DIAGNOSIS — B00.9 HSV (HERPES SIMPLEX VIRUS) INFECTION: ICD-10-CM

## 2019-11-18 RX ORDER — ACYCLOVIR 400 MG/1
TABLET ORAL
Qty: 30 TABLET | Refills: 0 | Status: SHIPPED | OUTPATIENT
Start: 2019-11-18 | End: 2020-04-14 | Stop reason: SDUPTHER

## 2020-02-05 ENCOUNTER — OFFICE VISIT (OUTPATIENT)
Dept: CARDIOLOGY CLINIC | Facility: CLINIC | Age: 50
End: 2020-02-05
Payer: COMMERCIAL

## 2020-02-05 VITALS
HEIGHT: 67 IN | SYSTOLIC BLOOD PRESSURE: 112 MMHG | BODY MASS INDEX: 20.09 KG/M2 | DIASTOLIC BLOOD PRESSURE: 72 MMHG | WEIGHT: 128 LBS | OXYGEN SATURATION: 99 % | RESPIRATION RATE: 18 BRPM | HEART RATE: 51 BPM

## 2020-02-05 DIAGNOSIS — Z76.89 ENCOUNTER TO ESTABLISH CARE: Primary | ICD-10-CM

## 2020-02-05 DIAGNOSIS — R00.1 BRADYCARDIA: ICD-10-CM

## 2020-02-05 DIAGNOSIS — I10 HYPERTENSION, UNSPECIFIED TYPE: ICD-10-CM

## 2020-02-05 PROCEDURE — 99214 OFFICE O/P EST MOD 30 MIN: CPT | Performed by: INTERNAL MEDICINE

## 2020-02-05 PROCEDURE — 3078F DIAST BP <80 MM HG: CPT | Performed by: INTERNAL MEDICINE

## 2020-02-05 PROCEDURE — 3074F SYST BP LT 130 MM HG: CPT | Performed by: INTERNAL MEDICINE

## 2020-02-05 PROCEDURE — 93000 ELECTROCARDIOGRAM COMPLETE: CPT | Performed by: INTERNAL MEDICINE

## 2020-02-05 PROCEDURE — 1036F TOBACCO NON-USER: CPT | Performed by: INTERNAL MEDICINE

## 2020-02-05 PROCEDURE — 3008F BODY MASS INDEX DOCD: CPT | Performed by: INTERNAL MEDICINE

## 2020-02-05 NOTE — PROGRESS NOTES
Cardiology Office Note    Lj Escobar 47 y o  female MRN: [id number]    02/05/20          Assessment/Plan:  1  Hypertension- well controlled on norvasc     2  Bradycardia- sinus bradycardia- stable; she denies symptoms including lightheadedness, presyncope or syncope  Holter 5/2018 with no significant bradyarrhythmia  1  Encounter to establish care  POCT ECG   2  Hypertension, unspecified type     3  Bradycardia         HPI: Lj Escobar is a 47y o  year old female with history of PTSD, anxiety, hypertension and bradycardia who presents for routine follow-up  Past medical history:  · She was initially evaluated in 2018 following bradycardic episodes with heart rates in the 20s while undergoing EGD/colonoscopy  She also noted intermittent chest tightness  She was evaluated with an exercise stress echo in May 2018 that was negative for ischemia  She was evaluated with Holter monitor that revealed no significant tachy or asif arrhythmia  She was on propranolol for anxiety which was discontinued with improvement in her heart rates  She is doing well from a cardiac standpoint today  She denies chest tightness, lightheadedness, dyspnea on exertion, syncope, presyncope or any other related concerns  Her blood pressure remains well controlled on Norvasc  She denies any other concerns at this time      Family History: grandmother with MI; sister with CVA at 48years old    Social history: she uses prescribed marijuana tinctures       Patient Active Problem List   Diagnosis    PTSD (post-traumatic stress disorder)    MDD (major depressive disorder)    HSV (herpes simplex virus) infection    History of abnormal cervical Pap smear    Family history of breast cancer    Bradycardia    Essential hypertension    Anxiety    Gastroesophageal reflux disease without esophagitis    Breast pain    Genetic testing    Family history of ovarian cancer       Allergies   Allergen Reactions    Lisinopril Angioedema         Current Outpatient Medications:     acyclovir (ZOVIRAX) 5 % ointment, APPLY TOPICALLY EVERY 6 HOURS (Patient taking differently: No sig reported), Disp: 30 g, Rfl: 0    amLODIPine (NORVASC) 5 mg tablet, Take 5 mg by mouth daily, Disp: , Rfl: 0    clonazePAM (KlonoPIN) 1 mg tablet, Take 1 mg by mouth 3 (three) times a day, Disp: , Rfl: 0    dronabinol (MARINOL) 2 5 mg capsule, TAKE 1 CAPSULE BY MOUTH TWICE A DAY, Disp: 60 capsule, Rfl: 0    traZODone (DESYREL) 100 mg tablet, Take 50 mg by mouth daily at bedtime as needed  , Disp: , Rfl:     acyclovir (ZOVIRAX) 400 MG tablet, TAKE 1 TABLET BY MOUTH THREE TIMES A DAY AS NEEDED FOR 5 DAYS, Disp: 30 tablet, Rfl: 0    citalopram (CeleXA) 40 mg tablet, Take 40 mg by mouth daily, Disp: , Rfl: 0    pantoprazole (PROTONIX) 40 mg tablet, TAKE 1 TABLET BY MOUTH EVERY DAY, Disp: , Rfl:     Past Medical History:   Diagnosis Date    Abnormal Pap smear of cervix     Anxiety     Bradycardia     MDD (major depressive disorder)     PTSD (post-traumatic stress disorder)        Family History   Problem Relation Age of Onset    No Known Problems Mother     Lung cancer Father     Colon polyps Father     Pancreatic cancer Maternal Grandmother     Heart disease Paternal Grandmother     Hypertension Paternal Grandmother     Breast cancer Maternal Aunt 39    Uterine cancer Maternal Aunt     Breast cancer Maternal Aunt 46    Colon cancer Neg Hx     Ovarian cancer Neg Hx     Cervical cancer Neg Hx        Past Surgical History:   Procedure Laterality Date    BREAST CYST EXCISION Left 1984    benign    CERVICAL BIOPSY  W/ LOOP ELECTRODE EXCISION       SECTION  2013    HYSTERECTOMY  2015    OOPHORECTOMY Bilateral 2015    PARTIAL HYSTERECTOMY  2014       Social History     Socioeconomic History    Marital status: /Civil Union     Spouse name: Not on file    Number of children: Not on file    Years of education: Not on file   [place] Highest education level: Not on file   Occupational History    Not on file   Social Needs    Financial resource strain: Not on file    Food insecurity:     Worry: Not on file     Inability: Not on file    Transportation needs:     Medical: Not on file     Non-medical: Not on file   Tobacco Use    Smoking status: Never Smoker    Smokeless tobacco: Never Used   Substance and Sexual Activity    Alcohol use: Yes     Frequency: Monthly or less     Comment: Occasionally    Drug use: Yes     Types: Marijuana     Comment: Medical Marijuana Card    Sexual activity: Yes     Birth control/protection: Surgical   Lifestyle    Physical activity:     Days per week: Not on file     Minutes per session: Not on file    Stress: Not on file   Relationships    Social connections:     Talks on phone: Not on file     Gets together: Not on file     Attends Rastafarian service: Not on file     Active member of club or organization: Not on file     Attends meetings of clubs or organizations: Not on file     Relationship status: Not on file    Intimate partner violence:     Fear of current or ex partner: Not on file     Emotionally abused: Not on file     Physically abused: Not on file     Forced sexual activity: Not on file   Other Topics Concern    Not on file   Social History Narrative    Not on file       Review of Systems   Constitution: Negative for diaphoresis, weight gain and weight loss  HENT: Negative for congestion  Cardiovascular: Negative for chest pain, dyspnea on exertion, irregular heartbeat, leg swelling, near-syncope, orthopnea, palpitations, paroxysmal nocturnal dyspnea and syncope  Respiratory: Negative for shortness of breath, sleep disturbances due to breathing and snoring  Hematologic/Lymphatic: Does not bruise/bleed easily  Skin: Negative for rash  Musculoskeletal: Negative for myalgias  Gastrointestinal: Negative for nausea and vomiting     Neurological: Negative for excessive daytime sleepiness and light-headedness  Psychiatric/Behavioral: The patient is not nervous/anxious  Vitals: /72   Pulse (!) 51   Resp 18   Ht 5' 7" (1 702 m)   Wt 58 1 kg (128 lb)   SpO2 99%   BMI 20 05 kg/m²       Physical Exam:     GEN: Alert and oriented x 3, in no acute distress  Well appearing and well nourished  HEENT: Sclera anicteric, conjunctivae pink, mucous membranes moist  Oropharynx clear  NECK: Supple, no carotid bruits, no significant JVD  Trachea midline, no thyromegaly  HEART: Regular rhythm, normal S1 and S2, no murmurs, clicks, gallops or rubs  PMI nondisplaced, no thrills  LUNGS: Clear to auscultation bilaterally; no wheezes, rales, or rhonchi  No increased work of breathing or signs of respiratory distress  ABDOMEN: Soft, nontender, nondistended, normoactive bowel sounds  EXTREMITIES: Skin warm and well perfused, no clubbing, cyanosis, or edema  NEURO: No focal findings  Normal speech  Mood and affect normal    SKIN: Normal without suspicious lesions on exposed skin        Lab Results:       No results found for: HGBA1C  No results found for: CHOL  No results found for: HDL  No results found for: LDLCALC  No results found for: TRIG  No results found for: CHOLHDL

## 2020-02-28 ENCOUNTER — OFFICE VISIT (OUTPATIENT)
Dept: INTERNAL MEDICINE CLINIC | Facility: CLINIC | Age: 50
End: 2020-02-28
Payer: COMMERCIAL

## 2020-02-28 VITALS
OXYGEN SATURATION: 99 % | BODY MASS INDEX: 20.4 KG/M2 | WEIGHT: 130 LBS | HEIGHT: 67 IN | SYSTOLIC BLOOD PRESSURE: 132 MMHG | DIASTOLIC BLOOD PRESSURE: 82 MMHG | HEART RATE: 64 BPM

## 2020-02-28 DIAGNOSIS — F41.9 ANXIETY: ICD-10-CM

## 2020-02-28 DIAGNOSIS — F43.10 PTSD (POST-TRAUMATIC STRESS DISORDER): ICD-10-CM

## 2020-02-28 DIAGNOSIS — K21.9 GASTROESOPHAGEAL REFLUX DISEASE WITHOUT ESOPHAGITIS: ICD-10-CM

## 2020-02-28 DIAGNOSIS — Z12.11 SCREEN FOR COLON CANCER: ICD-10-CM

## 2020-02-28 DIAGNOSIS — I10 ESSENTIAL HYPERTENSION: Primary | ICD-10-CM

## 2020-02-28 DIAGNOSIS — Z11.4 ENCOUNTER FOR SCREENING FOR HIV: ICD-10-CM

## 2020-02-28 PROBLEM — M54.2 NECK PAIN: Status: ACTIVE | Noted: 2018-10-02

## 2020-02-28 PROCEDURE — 99214 OFFICE O/P EST MOD 30 MIN: CPT | Performed by: INTERNAL MEDICINE

## 2020-02-28 PROCEDURE — 1036F TOBACCO NON-USER: CPT | Performed by: INTERNAL MEDICINE

## 2020-02-28 PROCEDURE — 3075F SYST BP GE 130 - 139MM HG: CPT | Performed by: INTERNAL MEDICINE

## 2020-02-28 PROCEDURE — 3079F DIAST BP 80-89 MM HG: CPT | Performed by: INTERNAL MEDICINE

## 2020-02-28 PROCEDURE — 3008F BODY MASS INDEX DOCD: CPT | Performed by: INTERNAL MEDICINE

## 2020-02-28 RX ORDER — DRONABINOL 2.5 MG/1
2.5 CAPSULE ORAL 2 TIMES DAILY
Qty: 60 CAPSULE | Refills: 0 | Status: SHIPPED | OUTPATIENT
Start: 2020-02-28 | End: 2021-05-20

## 2020-02-28 RX ORDER — PROPRANOLOL HYDROCHLORIDE 20 MG/1
TABLET ORAL
COMMUNITY
Start: 2020-01-12 | End: 2021-08-16

## 2020-02-28 NOTE — PROGRESS NOTES
INTERNAL MEDICINE OFFICE VISIT  [facility], [address]  Tel: [phone]      NAME: Jostin Burroughs  AGE: 47 y o  SEX: female  : 1970   MRN: [id number]    DATE: 2020  TIME: 5:22 PM      Assessment and Plan:  1  Essential hypertension   continue present medication  - CBC and differential; Future  - Comprehensive metabolic panel; Future  - Lipid panel; Future  - TSH, 3rd generation; Future    2  Gastroesophageal reflux disease without esophagitis   continue pantoprazole    3  PTSD (post-traumatic stress disorder)   stable  - dronabinol (MARINOL) 2 5 mg capsule; Take 1 capsule (2 5 mg total) by mouth 2 (two) times a day  Dispense: 60 capsule; Refill: 0    4  Anxiety   continue Celexa    5  Encounter for screening for HIV    - HIV 1/2 AG-AB combo; Future    6  Screen for colon cancer    - Ambulatory referral to Gastroenterology; Future      - Counseling Documentation: patient was counseled regarding: diagnostic results, instructions for management, risk factor reductions, prognosis, patient and family education, impressions, risks and benefits of treatment options and importance of compliance with treatment  - Medication Side Effects: Adverse side effects of medications were reviewed with the patient/guardian today  Return for follow up visit in  6 months or earlier, if needed        Chief Complaint:  Chief Complaint   Patient presents with    Follow-up     ER Visit         History of Present Illness:    hypertension- blood pressure stable on present medication  GERD-takes pantoprazole with relief of symptoms  PTSD -on Marinol  Anxiety -takes Celexa      Active Problem List:  Patient Active Problem List   Diagnosis    PTSD (post-traumatic stress disorder)    MDD (major depressive disorder)    HSV (herpes simplex virus) infection    History of abnormal cervical Pap smear    Family history of breast cancer    Bradycardia  Essential hypertension    Anxiety    Gastroesophageal reflux disease without esophagitis    Breast pain    Genetic testing    Family history of ovarian cancer    Neck pain         Past Medical History:  Past Medical History:   Diagnosis Date    Abnormal Pap smear of cervix     Anxiety     Bradycardia     MDD (major depressive disorder)     PTSD (post-traumatic stress disorder)          Past Surgical History:  Past Surgical History:   Procedure Laterality Date    BREAST CYST EXCISION Left     benign    CERVICAL BIOPSY  W/ LOOP ELECTRODE EXCISION       SECTION  2013    HYSTERECTOMY  2015    OOPHORECTOMY Bilateral 2015    PARTIAL HYSTERECTOMY           Family History:  Family History   Problem Relation Age of Onset    No Known Problems Mother     Lung cancer Father     Colon polyps Father     Pancreatic cancer Maternal Grandmother     Heart disease Paternal Grandmother     Hypertension Paternal Grandmother     Breast cancer Maternal Aunt 39    Uterine cancer Maternal Aunt     Breast cancer Maternal Aunt 46    Colon cancer Neg Hx     Ovarian cancer Neg Hx     Cervical cancer Neg Hx          Social History:  Social History     Socioeconomic History    Marital status: /Civil Union     Spouse name: None    Number of children: None    Years of education: None    Highest education level: None   Occupational History    None   Social Needs    Financial resource strain: None    Food insecurity:     Worry: None     Inability: None    Transportation needs:     Medical: None     Non-medical: None   Tobacco Use    Smoking status: Never Smoker    Smokeless tobacco: Never Used   Substance and Sexual Activity    Alcohol use: Yes     Frequency: Monthly or less     Comment: Occasionally    Drug use: Yes     Types: Marijuana     Comment: Medical Marijuana Card    Sexual activity: Yes     Birth control/protection: Surgical   Lifestyle    Physical activity:     Days per week: 0 days     Minutes per session: 0 min    Stress: None   Relationships    Social connections:     Talks on phone: None     Gets together: None     Attends Shinto service: None     Active member of club or organization: None     Attends meetings of clubs or organizations: None     Relationship status: None    Intimate partner violence:     Fear of current or ex partner: None     Emotionally abused: None     Physically abused: None     Forced sexual activity: None   Other Topics Concern    None   Social History Narrative    None         Allergies:   Allergies   Allergen Reactions    Lisinopril Angioedema         Medications:    Current Outpatient Medications:     acyclovir (ZOVIRAX) 5 % ointment, APPLY TOPICALLY EVERY 6 HOURS (Patient taking differently: No sig reported), Disp: 30 g, Rfl: 0    amLODIPine (NORVASC) 5 mg tablet, Take 5 mg by mouth daily, Disp: , Rfl: 0    citalopram (CeleXA) 40 mg tablet, Take 40 mg by mouth daily, Disp: , Rfl: 0    clonazePAM (KlonoPIN) 1 mg tablet, Take 1 mg by mouth 3 (three) times a day, Disp: , Rfl: 0    dronabinol (MARINOL) 2 5 mg capsule, Take 1 capsule (2 5 mg total) by mouth 2 (two) times a day, Disp: 60 capsule, Rfl: 0    pantoprazole (PROTONIX) 40 mg tablet, TAKE 1 TABLET BY MOUTH EVERY DAY, Disp: , Rfl:     propranolol (INDERAL) 20 mg tablet, , Disp: , Rfl:     traZODone (DESYREL) 100 mg tablet, Take 50 mg by mouth daily at bedtime as needed  , Disp: , Rfl:     acyclovir (ZOVIRAX) 400 MG tablet, TAKE 1 TABLET BY MOUTH THREE TIMES A DAY AS NEEDED FOR 5 DAYS, Disp: 30 tablet, Rfl: 0      The following portions of the patient's history were reviewed and updated as appropriate: past medical history, past surgical history, family history, social history, allergies, current medications and active problem list       Review of Systems:  Constitutional: Denies fever, chills, weight gain, weight loss, fatigue  Eyes: Denies eye redness, eye discharge, double vision, change in visual acuity  ENT: Denies hearing loss, tinnitus, sneezing, nasal congestion, nasal discharge, sore throat   Respiratory: Denies cough, expectoration, hemoptysis, shortness of breath, wheezing  Cardiovascular: Denies chest pain, palpitations, lower extremity swelling, orthopnea, PND  Gastrointestinal: Denies abdominal pain, heartburn, nausea, vomiting, hematemesis, diarrhea, bloody stools  Genito-Urinary: Denies dysuria, frequency, difficulty in micturition, nocturia, incontinence  Musculoskeletal: Denies back pain, joint pain, muscle pain  Neurologic: Denies confusion, lightheadedness, syncope, headache, focal weakness, sensory changes, seizures  Endocrine: Denies polyuria, polydipsia, temperature intolerance  Allergy and Immunology: Denies hives, insect bite sensitivity  Hematological and Lymphatic: Denies bleeding problems, swollen glands   Psychological: has depression,  anxiety, panic, mood swings  Dermatological: Denies pruritus, rash, skin lesion changes      Vitals:  Vitals:    02/28/20 1503   BP: 132/82   Pulse: 64   SpO2: 99%       Body mass index is 20 36 kg/m²  Weight (last 2 days)     Date/Time   Weight    02/28/20 1503   59 (130)                Physical Examination:  General: Patient is not in acute distress  Awake, alert, responding to commands  No weight gain or loss  Head: Normocephalic  Atraumatic  Eyes: Conjunctiva and lids with no swelling, erythema or discharge  Both pupils normal sized, round and reactive to light  Sclera nonicteric  ENT: External examination of nose and ear normal  Otoscopic examination shows translucent tympanic membranes with patent canals without erythema  Oropharynx moist with no erythema, edema, exudate or lesions  Neck: Supple  JVP not raised  Trachea midline  No masses  No thyromegaly  Lungs: No signs of increased work of breathing or respiratory distress   Bilateral bronchovascular breath sounds with no crackles or rhonchi  Chest wall: No tenderness  Cardiovascular: Normal PMI  No thrills  Regular rate and rhythm  S1 and S2 normal  No murmur, rub or gallop  Gastrointestinal: Abdomen soft, nontender  No guarding or rigidity  Liver and spleen not palpable  Bowel sounds present  Neurologic: Cranial nerves II-XII intact  Cortical functions normal  Motor system - Reflexes 2+ and symmetrical  Sensations normal  Musculoskeletal: Gait normal  No joint tenderness  Integumentary: Skin normal with no rash or lesions  Lymphatic: No palpable lymph nodes in neck, axilla or groin  Extremities: No clubbing, cyanosis, edema or varicosities  Psychological: Judgement and insight normal  Depressed and anxious      Laboratory Results:  CBC with diff:   No results found for: WBC, RBC, HGB, HCT, MCV, MCH, RDW, PLT    CMP:  No results found for: CREATININE, BUN, NA, K, CL, CO2, GLUCOSE, PROT, ALKPHOS, ALT, AST, BILIDIR    No results found for: HGBA1C, MG, PHOS    No results found for: TROPONINI, CKMB, CKTOTAL    Lipid Profile:   No results found for: CHOL  No results found for: HDL  No results found for: LDLCALC  No results found for: TRIG    Imaging Results:  Mammo diagnostic bilateral w 3d & cad, US breast right limited (diagnostic)  Narrative: DIAGNOSIS: Breast pain    RELEVANT HISTORY:   Family Breast Cancer History: History of breast cancer in Maternal Aunt,   Maternal Aunt  Family Medical history: Family medical history includes breast cancer in 2   maternal aunts  Personal History: Hormone history includes birth control  Surgical history   includes breast excisional biopsy, hysterectomy, and oophorectomy  No   relevant medical history has been documented for this patient  COMPARISONS: None available  INDICATION: Qi Schmidt is a 51 y o  female presenting for breast pain  TECHNIQUE:  The current study was evaluated with Computer Aided Detection  TISSUE DENSITY:   The breasts are heterogeneously dense, which may obscure small masses       RISK ASSESSMENT:   5 Year Tyrer-Cuzick: 1 27 %  10 Year Tyrer-Cuzick: 2 67 %  Lifetime Tyrer-Cuzick: 11 85 %    FINDINGS:   Bilateral  There are no suspicious masses, grouped microcalcifications or areas of   architectural distortion  The skin and nipple areolar complex are   unremarkable  Deep to the palpable abnormality on ultrasound, there is no   evidence of hypoechoic mass or architectural distortion  There is the   suggestion of a muscle irregularity at the 10 o'clock position of the   right breast, 8 cm from the nipple  Intramuscular hematoma/muscular   injury cannot be excluded  Clinical correlation recommended  Impression: No evidence of breast malignancy  Questionable intramuscular injury at the   posterior 10:00 position of the right breast     ASSESSMENT/BI-RADS CATEGORY:  Left: 2 - Benign  Right: 2 - Benign  Overall: 2 - Benign    RECOMMENDATION:       - Routine screening mammogram in 1 year for both breasts         - Clinical management for the right breast     Workstation ID: [id number]       Health Maintenance:  Health Maintenance   Topic Date Due    CRC Screening: Colonoscopy  1970    HIV Screening  05/31/1984    Annual Physical  05/31/1987    Influenza Vaccine  07/01/2019    MAMMOGRAM  03/21/2020    BMI: Adult  02/28/2021    Cervical Cancer Screening  03/30/2021    DTaP,Tdap,and Td Vaccines (2 - Td) 11/29/2027    Pneumococcal Vaccine: 65+ Years (1 of 2 - PCV13) 05/31/2034    Pneumococcal Vaccine: Pediatrics (0 to 5 Years) and At-Risk Patients (6 to 59 Years)  Aged Out    HIB Vaccine  Aged Out    Hepatitis B Vaccine  Aged Out    IPV Vaccine  Aged Out    Hepatitis A Vaccine  Aged Out    Meningococcal ACWY Vaccine  Aged Out    HPV Vaccine  Aged [employer] History   Administered Date(s) Administered    Fluzone Split Quad 0 5 mL 11/29/2017    INFLUENZA 01/28/2017, 11/29/2017    Tdap 11/29/2017         Sixto Farrar MD  2/28/2020,5:22 PM

## 2020-03-26 ENCOUNTER — TELEPHONE (OUTPATIENT)
Dept: INTERNAL MEDICINE CLINIC | Facility: CLINIC | Age: 50
End: 2020-03-26

## 2020-03-27 ENCOUNTER — TELEMEDICINE (OUTPATIENT)
Dept: INTERNAL MEDICINE CLINIC | Facility: CLINIC | Age: 50
End: 2020-03-27
Payer: COMMERCIAL

## 2020-03-27 DIAGNOSIS — J06.9 UPPER RESPIRATORY TRACT INFECTION, UNSPECIFIED TYPE: Primary | ICD-10-CM

## 2020-03-27 PROCEDURE — 99213 OFFICE O/P EST LOW 20 MIN: CPT | Performed by: INTERNAL MEDICINE

## 2020-03-27 NOTE — PROGRESS NOTES
Virtual Regular Visit    Problem List Items Addressed This Visit     None      Visit Diagnoses     Upper respiratory tract infection, unspecified type    -  Primary               Reason for visit is URI    Encounter provider Angela Ritchie MD    Provider located at [address] [place] [address]      Recent Visits  Date Type Provider Dept   20 Telephone [facility] 84 recent visits within past 7 days and meeting all other requirements     Future Appointments  No visits were found meeting these conditions  Showing future appointments within next 150 days and meeting all other requirements        After connecting through [employer]4tec, the patient was identified by name and date of birth  Lorne Schroeder was informed that this is a telemedicine visit and that the visit is being conducted through [facility] and patient was informed that this is not a secure, HIPAA-complaint platform  she agrees to proceed  which may not be secure and therefore, might not be HIPAA-compliant  My office door was closed  No one else was in the room  She acknowledged consent and understanding of privacy and security of the video platform  The patient has agreed to participate and understands they can discontinue the visit at any time  Tasha Mckeon is a 47 y o  female who had soreness in the throat with a post nasal drip for the last couple of days  Denies fever, cough or SOB        Past Medical History:   Diagnosis Date    Abnormal Pap smear of cervix     Anxiety     Bradycardia     MDD (major depressive disorder)     PTSD (post-traumatic stress disorder)        Past Surgical History:   Procedure Laterality Date    BREAST CYST EXCISION Left     benign    CERVICAL BIOPSY  W/ LOOP ELECTRODE EXCISION       SECTION  2013    HYSTERECTOMY  2015    OOPHORECTOMY Bilateral 2015    PARTIAL HYSTERECTOMY 2014       Current Outpatient Medications   Medication Sig Dispense Refill    acyclovir (ZOVIRAX) 400 MG tablet TAKE 1 TABLET BY MOUTH THREE TIMES A DAY AS NEEDED FOR 5 DAYS 30 tablet 0    acyclovir (ZOVIRAX) 5 % ointment APPLY TOPICALLY EVERY 6 HOURS (Patient taking differently: No sig reported) 30 g 0    amLODIPine (NORVASC) 5 mg tablet Take 5 mg by mouth daily  0    citalopram (CeleXA) 40 mg tablet Take 40 mg by mouth daily  0    clonazePAM (KlonoPIN) 1 mg tablet Take 1 mg by mouth 3 (three) times a day  0    dronabinol (MARINOL) 2 5 mg capsule Take 1 capsule (2 5 mg total) by mouth 2 (two) times a day 60 capsule 0    pantoprazole (PROTONIX) 40 mg tablet TAKE 1 TABLET BY MOUTH EVERY DAY      propranolol (INDERAL) 20 mg tablet       traZODone (DESYREL) 100 mg tablet Take 50 mg by mouth daily at bedtime as needed         No current facility-administered medications for this visit  Allergies   Allergen Reactions    Lisinopril Angioedema       Review of Systems   Constitutional: Negative for chills, diaphoresis, fatigue and fever  HENT: Positive for congestion, postnasal drip and sore throat  Negative for ear discharge, ear pain, hearing loss, rhinorrhea, sinus pressure, sinus pain, sneezing and voice change  Eyes: Negative for pain, discharge, redness and visual disturbance  Respiratory: Negative for cough, chest tightness, shortness of breath and wheezing  Cardiovascular: Negative for chest pain, palpitations and leg swelling  Gastrointestinal: Negative for abdominal distention, abdominal pain, blood in stool, constipation, diarrhea, nausea and vomiting  Endocrine: Negative for cold intolerance, heat intolerance, polydipsia, polyphagia and polyuria  Genitourinary: Negative for dysuria, flank pain, frequency, hematuria and urgency  Musculoskeletal: Negative for arthralgias, back pain, gait problem, joint swelling, myalgias, neck pain and neck stiffness  Skin: Negative for rash  Neurological: Negative for dizziness, tremors, syncope, facial asymmetry, speech difficulty, weakness, light-headedness, numbness and headaches  Hematological: Does not bruise/bleed easily  Psychiatric/Behavioral: Negative for behavioral problems, confusion and sleep disturbance  The patient is not nervous/anxious  Physical Exam   Constitutional: She is oriented to person, place, and time  No distress  Eyes: Conjunctivae are normal    Neck: Normal range of motion  Pulmonary/Chest: No respiratory distress  Neurological: She is alert and oriented to person, place, and time  Psychiatric: She has a normal mood and affect  Her behavior is normal  Judgment normal       Patient was advised to continue with the hot liquids and take Tylenol as needed    I spent 15 minutes with the patient during this visit

## 2020-04-14 DIAGNOSIS — B00.9 HSV (HERPES SIMPLEX VIRUS) INFECTION: ICD-10-CM

## 2020-04-14 DIAGNOSIS — F43.10 PTSD (POST-TRAUMATIC STRESS DISORDER): ICD-10-CM

## 2020-04-14 RX ORDER — DRONABINOL 2.5 MG/1
2.5 CAPSULE ORAL 2 TIMES DAILY
Qty: 60 CAPSULE | Refills: 0 | OUTPATIENT
Start: 2020-04-14

## 2020-04-14 RX ORDER — ACYCLOVIR 50 MG/G
OINTMENT TOPICAL
Qty: 30 G | Refills: 0 | Status: SHIPPED | OUTPATIENT
Start: 2020-04-14 | End: 2021-03-25 | Stop reason: SDUPTHER

## 2020-04-14 RX ORDER — ACYCLOVIR 400 MG/1
400 TABLET ORAL
Qty: 30 TABLET | Refills: 0 | Status: SHIPPED | OUTPATIENT
Start: 2020-04-14 | End: 2021-03-25 | Stop reason: SDUPTHER

## 2020-04-20 DIAGNOSIS — F43.10 PTSD (POST-TRAUMATIC STRESS DISORDER): ICD-10-CM

## 2020-04-20 RX ORDER — DRONABINOL 2.5 MG/1
2.5 CAPSULE ORAL 2 TIMES DAILY
Qty: 60 CAPSULE | Refills: 0 | OUTPATIENT
Start: 2020-04-20

## 2020-10-30 ENCOUNTER — LAB (OUTPATIENT)
Dept: LAB | Facility: CLINIC | Age: 50
End: 2020-10-30
Payer: COMMERCIAL

## 2020-10-30 DIAGNOSIS — I10 ESSENTIAL HYPERTENSION: ICD-10-CM

## 2020-10-30 DIAGNOSIS — Z11.4 ENCOUNTER FOR SCREENING FOR HIV: ICD-10-CM

## 2020-10-30 LAB
ALBUMIN SERPL BCP-MCNC: 4 G/DL (ref 3.5–5)
ALP SERPL-CCNC: 71 U/L (ref 46–116)
ALT SERPL W P-5'-P-CCNC: 23 U/L (ref 12–78)
ANION GAP SERPL CALCULATED.3IONS-SCNC: 3 MMOL/L (ref 4–13)
AST SERPL W P-5'-P-CCNC: 15 U/L (ref 5–45)
BASOPHILS # BLD AUTO: 0.04 THOUSANDS/ΜL (ref 0–0.1)
BASOPHILS NFR BLD AUTO: 1 % (ref 0–1)
BILIRUB SERPL-MCNC: 0.77 MG/DL (ref 0.2–1)
BUN SERPL-MCNC: 14 MG/DL (ref 5–25)
CALCIUM SERPL-MCNC: 8.8 MG/DL (ref 8.3–10.1)
CHLORIDE SERPL-SCNC: 108 MMOL/L (ref 100–108)
CHOLEST SERPL-MCNC: 260 MG/DL (ref 50–200)
CO2 SERPL-SCNC: 31 MMOL/L (ref 21–32)
CREAT SERPL-MCNC: 0.91 MG/DL (ref 0.6–1.3)
EOSINOPHIL # BLD AUTO: 0.29 THOUSAND/ΜL (ref 0–0.61)
EOSINOPHIL NFR BLD AUTO: 5 % (ref 0–6)
ERYTHROCYTE [DISTWIDTH] IN BLOOD BY AUTOMATED COUNT: 13.4 % (ref 11.6–15.1)
GFR SERPL CREATININE-BSD FRML MDRD: 73 ML/MIN/1.73SQ M
GLUCOSE P FAST SERPL-MCNC: 82 MG/DL (ref 65–99)
HCT VFR BLD AUTO: 43.1 % (ref 34.8–46.1)
HDLC SERPL-MCNC: 62 MG/DL
HGB BLD-MCNC: 14.1 G/DL (ref 11.5–15.4)
IMM GRANULOCYTES # BLD AUTO: 0.02 THOUSAND/UL (ref 0–0.2)
IMM GRANULOCYTES NFR BLD AUTO: 0 % (ref 0–2)
LDLC SERPL CALC-MCNC: 186 MG/DL (ref 0–100)
LYMPHOCYTES # BLD AUTO: 2.16 THOUSANDS/ΜL (ref 0.6–4.47)
LYMPHOCYTES NFR BLD AUTO: 35 % (ref 14–44)
MCH RBC QN AUTO: 30 PG (ref 26.8–34.3)
MCHC RBC AUTO-ENTMCNC: 32.7 G/DL (ref 31.4–37.4)
MCV RBC AUTO: 92 FL (ref 82–98)
MONOCYTES # BLD AUTO: 0.6 THOUSAND/ΜL (ref 0.17–1.22)
MONOCYTES NFR BLD AUTO: 10 % (ref 4–12)
NEUTROPHILS # BLD AUTO: 2.99 THOUSANDS/ΜL (ref 1.85–7.62)
NEUTS SEG NFR BLD AUTO: 49 % (ref 43–75)
NONHDLC SERPL-MCNC: 198 MG/DL
NRBC BLD AUTO-RTO: 0 /100 WBCS
PLATELET # BLD AUTO: 212 THOUSANDS/UL (ref 149–390)
PMV BLD AUTO: 10.8 FL (ref 8.9–12.7)
POTASSIUM SERPL-SCNC: 4.6 MMOL/L (ref 3.5–5.3)
PROT SERPL-MCNC: 7.4 G/DL (ref 6.4–8.2)
RBC # BLD AUTO: 4.7 MILLION/UL (ref 3.81–5.12)
SODIUM SERPL-SCNC: 142 MMOL/L (ref 136–145)
TRIGL SERPL-MCNC: 62 MG/DL
TSH SERPL DL<=0.05 MIU/L-ACNC: 0.66 UIU/ML (ref 0.36–3.74)
WBC # BLD AUTO: 6.1 THOUSAND/UL (ref 4.31–10.16)

## 2020-10-30 PROCEDURE — 87389 HIV-1 AG W/HIV-1&-2 AB AG IA: CPT

## 2020-10-30 PROCEDURE — 80061 LIPID PANEL: CPT

## 2020-10-30 PROCEDURE — 85025 COMPLETE CBC W/AUTO DIFF WBC: CPT

## 2020-10-30 PROCEDURE — 36415 COLL VENOUS BLD VENIPUNCTURE: CPT

## 2020-10-30 PROCEDURE — 80053 COMPREHEN METABOLIC PANEL: CPT

## 2020-10-30 PROCEDURE — 84443 ASSAY THYROID STIM HORMONE: CPT

## 2020-10-31 LAB — HIV 1+2 AB+HIV1 P24 AG SERPL QL IA: NORMAL

## 2020-11-03 ENCOUNTER — TELEPHONE (OUTPATIENT)
Dept: INTERNAL MEDICINE CLINIC | Facility: CLINIC | Age: 50
End: 2020-11-03

## 2020-11-06 NOTE — PROGRESS NOTES
Cardiology Consultation     Meredith Benson  [id number]  1970  [address] [facility] [address]     chief complaints: Follow-up of bradycardia  And chest pain     history of present illness:  70-year-old female patient with past medical history of hypertension is here for follow-up after cardiac testing  She was seen last visit for significant bradycardia during endoscopy colonoscopy, chest pain, dizziness and shortness of breath  She had a Holter monitor which did not reveal any significant bradycardia  She did have occasional PACs and PVCs  Patient denies having any palpitations  Stress echocardiogram was normal   Propranolol was stopped and since then heart rates have improved  She is on amlodipine and blood pressure has been well controlled  Patient unfortunately continues to get occasional chest pain  Patient Active Problem List   Diagnosis    PTSD (post-traumatic stress disorder)    MDD (major depressive disorder)    HSV (herpes simplex virus) infection    History of abnormal cervical Pap smear    Family history of breast cancer    Encounter for gynecological examination (general) (routine) with abnormal findings    Chest tightness    Chest pain    SOB (shortness of breath)    Bradycardia    Hypertension     Past Medical History:   Diagnosis Date    Abnormal Pap smear of cervix     Anxiety     Bradycardia     MDD (major depressive disorder)     PTSD (post-traumatic stress disorder)      Social History     Social History    Marital status: /Civil Union     Spouse name: N/A    Number of children: N/A    Years of education: N/A     Occupational History    Not on file       Social History Main Topics    Smoking status: Never Smoker    Smokeless tobacco: Never Used    Alcohol use Yes      Comment: Occasionally    Drug use: No    Sexual activity: Yes Birth control/ protection: Surgical     Other Topics Concern    Not on file     Social History Narrative    No narrative on file      Family History   Problem Relation Age of Onset    No Known Problems Mother     Lung cancer Father     Pancreatic cancer Maternal Grandmother     Heart disease Paternal Grandmother     Hypertension Paternal Grandmother     Breast cancer Maternal Aunt     Uterine cancer Maternal Aunt     Colon cancer Neg Hx     Ovarian cancer Neg Hx     Cervical cancer Neg Hx      Past Surgical History:   Procedure Laterality Date    CERVICAL BIOPSY  W/ LOOP ELECTRODE EXCISION       SECTION  2013    PARTIAL HYSTERECTOMY         Current Outpatient Prescriptions:     acyclovir (ZOVIRAX) 5 % ointment, Apply topically every 6 (six) hours, Disp: 15 g, Rfl: 2    amLODIPine (NORVASC) 5 mg tablet, Take 5 mg by mouth daily, Disp: , Rfl: 0    citalopram (CeleXA) 40 mg tablet, Take 40 mg by mouth daily, Disp: , Rfl: 0    clonazePAM (KlonoPIN) 1 mg tablet, Take 1 mg by mouth 3 (three) times a day, Disp: , Rfl: 0    dronabinol (MARINOL) 2 5 mg capsule, Take 2 5 mg by mouth 2 (two) times a day, Disp: , Rfl: 0    traZODone (DESYREL) 100 mg tablet, Take 50 mg by mouth daily at bedtime as needed  , Disp: , Rfl:   Allergies   Allergen Reactions    Lisinopril Angioedema     Vitals:    18 1113   BP: 128/88   Pulse: 78   SpO2: 98%   Weight: 60 4 kg (133 lb 3 2 oz)   Height: 5' 6" (1 676 m)       Labs:  Hospital Outpatient Visit on 2018   Component Date Value    Protocol Name 2018 BENIGNO     Time In Exercise Phase 2018 00:09:00     MAX   SYSTOLIC BP  156     Max Diastolic Bp  88     Max Heart Rate 2018 148     Max Predicted Heart Rate 2018 172     Reason for Termination 2018                      Value:Target Heart Rate Achieved  Maximal exercise (symptom limited)      Test Indication 2018 SOB     Target Hr Formular 05/25/2018 (220 - Age)*85%      Imaging: No results found  Review of Systems:  Review of Systems   Constitutional: Negative for diaphoresis and fatigue  HENT: Negative for congestion and facial swelling  Eyes: Negative for photophobia and visual disturbance  Respiratory: Negative for chest tightness and shortness of breath  Cardiovascular: Positive for chest pain  Negative for palpitations and leg swelling  Gastrointestinal: Negative for abdominal pain and nausea  Endocrine: Negative for cold intolerance and heat intolerance  Musculoskeletal: Negative for arthralgias and myalgias  Skin: Negative for pallor and rash  Neurological: Negative for dizziness and tremors  Psychiatric/Behavioral: Negative for sleep disturbance  The patient is not nervous/anxious  Physical Exam:  Physical Exam   Constitutional: She is oriented to person, place, and time  She appears well-developed and well-nourished  HENT:   Head: Normocephalic and atraumatic  Eyes: Conjunctivae and EOM are normal  Pupils are equal, round, and reactive to light  Neck: Normal range of motion  Neck supple  No JVD present  No thyromegaly present  Cardiovascular: Normal rate, regular rhythm, S1 normal, S2 normal, normal heart sounds and intact distal pulses  Exam reveals no gallop and no friction rub  No murmur heard  Pulses:       Carotid pulses are 2+ on the right side, and 2+ on the left side  Pulmonary/Chest: Effort normal and breath sounds normal  No respiratory distress  She has no wheezes  She has no rales  Abdominal: Soft  Bowel sounds are normal  She exhibits no distension  There is no tenderness  There is no rebound and no guarding  Musculoskeletal: Normal range of motion  She exhibits no edema  Neurological: She is alert and oriented to person, place, and time  She has normal reflexes  No cranial nerve deficit  Skin: Skin is warm and dry  Psychiatric: She has a normal mood and affect  Discussion/Summary:    1  Sinus bradycardia, nonsignificant, asymptomatic  Continue to hold beta-blocker for now  2  Occasional PACs and PVCs, asymptomatic     3  Hypertension, blood pressure well controlled     4  Chest pain, stress test normal probably noncardiac  Stress test results were discussed with the patient  And questions answered  1855

## 2020-11-16 ENCOUNTER — OFFICE VISIT (OUTPATIENT)
Dept: INTERNAL MEDICINE CLINIC | Facility: CLINIC | Age: 50
End: 2020-11-16
Payer: COMMERCIAL

## 2020-11-16 VITALS
DIASTOLIC BLOOD PRESSURE: 80 MMHG | HEIGHT: 67 IN | TEMPERATURE: 97.3 F | BODY MASS INDEX: 21.75 KG/M2 | HEART RATE: 57 BPM | SYSTOLIC BLOOD PRESSURE: 132 MMHG | WEIGHT: 138.6 LBS | OXYGEN SATURATION: 99 %

## 2020-11-16 DIAGNOSIS — E78.2 MIXED HYPERLIPIDEMIA: ICD-10-CM

## 2020-11-16 DIAGNOSIS — F33.41 RECURRENT MAJOR DEPRESSIVE DISORDER, IN PARTIAL REMISSION (HCC): ICD-10-CM

## 2020-11-16 DIAGNOSIS — F41.9 ANXIETY: ICD-10-CM

## 2020-11-16 DIAGNOSIS — I10 ESSENTIAL HYPERTENSION: Primary | ICD-10-CM

## 2020-11-16 PROBLEM — Z87.42 HISTORY OF ABNORMAL CERVICAL PAP SMEAR: Status: RESOLVED | Noted: 2018-03-30 | Resolved: 2020-11-16

## 2020-11-16 PROCEDURE — 3725F SCREEN DEPRESSION PERFORMED: CPT | Performed by: INTERNAL MEDICINE

## 2020-11-16 PROCEDURE — 3075F SYST BP GE 130 - 139MM HG: CPT | Performed by: INTERNAL MEDICINE

## 2020-11-16 PROCEDURE — 3079F DIAST BP 80-89 MM HG: CPT | Performed by: INTERNAL MEDICINE

## 2020-11-16 PROCEDURE — 99214 OFFICE O/P EST MOD 30 MIN: CPT | Performed by: INTERNAL MEDICINE

## 2020-11-17 ENCOUNTER — ANNUAL EXAM (OUTPATIENT)
Dept: OBGYN CLINIC | Age: 50
End: 2020-11-17
Payer: COMMERCIAL

## 2020-11-17 VITALS
BODY MASS INDEX: 21.44 KG/M2 | DIASTOLIC BLOOD PRESSURE: 74 MMHG | WEIGHT: 136.6 LBS | TEMPERATURE: 97.1 F | HEIGHT: 67 IN | SYSTOLIC BLOOD PRESSURE: 112 MMHG

## 2020-11-17 DIAGNOSIS — Z12.31 SCREENING MAMMOGRAM, ENCOUNTER FOR: ICD-10-CM

## 2020-11-17 DIAGNOSIS — K64.9 HEMORRHOIDS, UNSPECIFIED HEMORRHOID TYPE: ICD-10-CM

## 2020-11-17 DIAGNOSIS — N95.2 ATROPHIC VAGINITIS: ICD-10-CM

## 2020-11-17 DIAGNOSIS — Z12.11 ENCOUNTER FOR SCREENING COLONOSCOPY: ICD-10-CM

## 2020-11-17 DIAGNOSIS — Z01.419 ROUTINE GYNECOLOGICAL EXAMINATION: Primary | ICD-10-CM

## 2020-11-17 PROCEDURE — 1036F TOBACCO NON-USER: CPT | Performed by: STUDENT IN AN ORGANIZED HEALTH CARE EDUCATION/TRAINING PROGRAM

## 2020-11-17 PROCEDURE — 3008F BODY MASS INDEX DOCD: CPT | Performed by: STUDENT IN AN ORGANIZED HEALTH CARE EDUCATION/TRAINING PROGRAM

## 2020-11-17 PROCEDURE — 87491 CHLMYD TRACH DNA AMP PROBE: CPT | Performed by: STUDENT IN AN ORGANIZED HEALTH CARE EDUCATION/TRAINING PROGRAM

## 2020-11-17 PROCEDURE — 87086 URINE CULTURE/COLONY COUNT: CPT | Performed by: STUDENT IN AN ORGANIZED HEALTH CARE EDUCATION/TRAINING PROGRAM

## 2020-11-17 PROCEDURE — 87624 HPV HI-RISK TYP POOLED RSLT: CPT | Performed by: STUDENT IN AN ORGANIZED HEALTH CARE EDUCATION/TRAINING PROGRAM

## 2020-11-17 PROCEDURE — 87591 N.GONORRHOEAE DNA AMP PROB: CPT | Performed by: STUDENT IN AN ORGANIZED HEALTH CARE EDUCATION/TRAINING PROGRAM

## 2020-11-17 PROCEDURE — G0145 SCR C/V CYTO,THINLAYER,RESCR: HCPCS | Performed by: STUDENT IN AN ORGANIZED HEALTH CARE EDUCATION/TRAINING PROGRAM

## 2020-11-17 PROCEDURE — 99396 PREV VISIT EST AGE 40-64: CPT | Performed by: STUDENT IN AN ORGANIZED HEALTH CARE EDUCATION/TRAINING PROGRAM

## 2020-11-17 RX ORDER — ESTRADIOL 10 UG/1
INSERT VAGINAL
Qty: 30 TABLET | Refills: 4 | Status: SHIPPED | OUTPATIENT
Start: 2020-11-17 | End: 2022-01-05

## 2020-11-18 LAB — BACTERIA UR CULT: NORMAL

## 2020-11-19 ENCOUNTER — TELEPHONE (OUTPATIENT)
Dept: OBGYN CLINIC | Facility: CLINIC | Age: 50
End: 2020-11-19

## 2020-11-20 LAB
C TRACH DNA SPEC QL NAA+PROBE: NEGATIVE
HPV HR 12 DNA CVX QL NAA+PROBE: NEGATIVE
HPV16 DNA CVX QL NAA+PROBE: NEGATIVE
HPV18 DNA CVX QL NAA+PROBE: NEGATIVE
LAB AP GYN PRIMARY INTERPRETATION: NORMAL
Lab: NORMAL
N GONORRHOEA DNA SPEC QL NAA+PROBE: NEGATIVE

## 2020-12-28 ENCOUNTER — LAB (OUTPATIENT)
Dept: LAB | Facility: CLINIC | Age: 50
End: 2020-12-28
Payer: COMMERCIAL

## 2020-12-28 DIAGNOSIS — E78.2 MIXED HYPERLIPIDEMIA: ICD-10-CM

## 2020-12-28 DIAGNOSIS — Z01.419 ROUTINE GYNECOLOGICAL EXAMINATION: ICD-10-CM

## 2020-12-28 LAB
ALBUMIN SERPL BCP-MCNC: 3.9 G/DL (ref 3.5–5)
ALP SERPL-CCNC: 67 U/L (ref 46–116)
ALT SERPL W P-5'-P-CCNC: 18 U/L (ref 12–78)
ANION GAP SERPL CALCULATED.3IONS-SCNC: 3 MMOL/L (ref 4–13)
AST SERPL W P-5'-P-CCNC: 10 U/L (ref 5–45)
BASOPHILS # BLD AUTO: 0.03 THOUSANDS/ΜL (ref 0–0.1)
BASOPHILS NFR BLD AUTO: 1 % (ref 0–1)
BILIRUB SERPL-MCNC: 0.68 MG/DL (ref 0.2–1)
BUN SERPL-MCNC: 13 MG/DL (ref 5–25)
CALCIUM SERPL-MCNC: 9.1 MG/DL (ref 8.3–10.1)
CHLORIDE SERPL-SCNC: 111 MMOL/L (ref 100–108)
CHOLEST SERPL-MCNC: 241 MG/DL (ref 50–200)
CO2 SERPL-SCNC: 28 MMOL/L (ref 21–32)
CREAT SERPL-MCNC: 0.9 MG/DL (ref 0.6–1.3)
EOSINOPHIL # BLD AUTO: 0.18 THOUSAND/ΜL (ref 0–0.61)
EOSINOPHIL NFR BLD AUTO: 3 % (ref 0–6)
ERYTHROCYTE [DISTWIDTH] IN BLOOD BY AUTOMATED COUNT: 13.6 % (ref 11.6–15.1)
GFR SERPL CREATININE-BSD FRML MDRD: 74 ML/MIN/1.73SQ M
GLUCOSE P FAST SERPL-MCNC: 90 MG/DL (ref 65–99)
HBV SURFACE AG SER QL: NORMAL
HCT VFR BLD AUTO: 40.3 % (ref 34.8–46.1)
HCV AB SER QL: NORMAL
HDLC SERPL-MCNC: 62 MG/DL
HGB BLD-MCNC: 12.9 G/DL (ref 11.5–15.4)
IMM GRANULOCYTES # BLD AUTO: 0.01 THOUSAND/UL (ref 0–0.2)
IMM GRANULOCYTES NFR BLD AUTO: 0 % (ref 0–2)
LDLC SERPL CALC-MCNC: 168 MG/DL (ref 0–100)
LYMPHOCYTES # BLD AUTO: 1.97 THOUSANDS/ΜL (ref 0.6–4.47)
LYMPHOCYTES NFR BLD AUTO: 35 % (ref 14–44)
MCH RBC QN AUTO: 29.1 PG (ref 26.8–34.3)
MCHC RBC AUTO-ENTMCNC: 32 G/DL (ref 31.4–37.4)
MCV RBC AUTO: 91 FL (ref 82–98)
MONOCYTES # BLD AUTO: 0.44 THOUSAND/ΜL (ref 0.17–1.22)
MONOCYTES NFR BLD AUTO: 8 % (ref 4–12)
NEUTROPHILS # BLD AUTO: 2.97 THOUSANDS/ΜL (ref 1.85–7.62)
NEUTS SEG NFR BLD AUTO: 53 % (ref 43–75)
NONHDLC SERPL-MCNC: 179 MG/DL
NRBC BLD AUTO-RTO: 0 /100 WBCS
PLATELET # BLD AUTO: 224 THOUSANDS/UL (ref 149–390)
PMV BLD AUTO: 10.5 FL (ref 8.9–12.7)
POTASSIUM SERPL-SCNC: 4.2 MMOL/L (ref 3.5–5.3)
PROT SERPL-MCNC: 7.3 G/DL (ref 6.4–8.2)
RBC # BLD AUTO: 4.44 MILLION/UL (ref 3.81–5.12)
RPR SER QL: NORMAL
SODIUM SERPL-SCNC: 142 MMOL/L (ref 136–145)
TRIGL SERPL-MCNC: 53 MG/DL
TSH SERPL DL<=0.05 MIU/L-ACNC: 0.88 UIU/ML (ref 0.36–3.74)
WBC # BLD AUTO: 5.6 THOUSAND/UL (ref 4.31–10.16)

## 2020-12-28 PROCEDURE — 36415 COLL VENOUS BLD VENIPUNCTURE: CPT

## 2020-12-28 PROCEDURE — 85025 COMPLETE CBC W/AUTO DIFF WBC: CPT

## 2020-12-28 PROCEDURE — 84443 ASSAY THYROID STIM HORMONE: CPT

## 2020-12-28 PROCEDURE — 87340 HEPATITIS B SURFACE AG IA: CPT

## 2020-12-28 PROCEDURE — 80053 COMPREHEN METABOLIC PANEL: CPT

## 2020-12-28 PROCEDURE — 80061 LIPID PANEL: CPT

## 2020-12-28 PROCEDURE — 86803 HEPATITIS C AB TEST: CPT

## 2020-12-28 PROCEDURE — 86592 SYPHILIS TEST NON-TREP QUAL: CPT

## 2020-12-28 PROCEDURE — 87389 HIV-1 AG W/HIV-1&-2 AB AG IA: CPT

## 2020-12-29 ENCOUNTER — TELEPHONE (OUTPATIENT)
Dept: INTERNAL MEDICINE CLINIC | Facility: CLINIC | Age: 50
End: 2020-12-29

## 2020-12-29 LAB — HIV 1+2 AB+HIV1 P24 AG SERPL QL IA: NORMAL

## 2020-12-30 ENCOUNTER — TELEPHONE (OUTPATIENT)
Dept: INTERNAL MEDICINE CLINIC | Facility: CLINIC | Age: 50
End: 2020-12-30

## 2021-01-03 DIAGNOSIS — G43.B0 OPHTHALMOPLEGIC MIGRAINE, NOT INTRACTABLE: Primary | ICD-10-CM

## 2021-01-04 ENCOUNTER — TELEPHONE (OUTPATIENT)
Dept: GASTROENTEROLOGY | Facility: CLINIC | Age: 51
End: 2021-01-04

## 2021-01-08 ENCOUNTER — TELEPHONE (OUTPATIENT)
Dept: NEUROLOGY | Facility: CLINIC | Age: 51
End: 2021-01-08

## 2021-01-08 NOTE — TELEPHONE ENCOUNTER
Best contact number for [facility]:[phone]    Emergency Contact name and number:    Referring provider and telephone number:Kristopher Crane    [facility] Ekta [facility]ala    Reason for Appointment/Dx:Ophthalmoplegic migraine, not intractable    Neurology Location patient would like to be seen:    Order received? Yes                                                Records Received? No    Have you ever seen another Neurologist?       No    Insurance Information    Insurance Name:[employer]    ID/Policy #:    Secondary Insurance:    ID/Policy#: Workman's Comp/ Accident/ School  Information      Workman's Comp/Accident/School related?        No    If yes name of Insurance company:    Date of Injury:    Type of Injury:    [address] Name and Telephone Number:    Notes:Mimi Doe pt pack sent                   Appointment date: 04/05/21 9:40am

## 2021-01-29 ENCOUNTER — ANESTHESIA EVENT (OUTPATIENT)
Dept: GASTROENTEROLOGY | Facility: HOSPITAL | Age: 51
End: 2021-01-29

## 2021-01-30 ENCOUNTER — HOSPITAL ENCOUNTER (OUTPATIENT)
Dept: GASTROENTEROLOGY | Facility: HOSPITAL | Age: 51
Setting detail: OUTPATIENT SURGERY
Discharge: HOME | End: 2021-01-30
Attending: INTERNAL MEDICINE
Payer: COMMERCIAL

## 2021-01-30 ENCOUNTER — ANESTHESIA (OUTPATIENT)
Dept: GASTROENTEROLOGY | Facility: HOSPITAL | Age: 51
End: 2021-01-30

## 2021-01-30 VITALS
OXYGEN SATURATION: 100 % | DIASTOLIC BLOOD PRESSURE: 82 MMHG | HEART RATE: 65 BPM | WEIGHT: 131.61 LBS | BODY MASS INDEX: 21.15 KG/M2 | TEMPERATURE: 98.2 F | RESPIRATION RATE: 20 BRPM | SYSTOLIC BLOOD PRESSURE: 109 MMHG | HEIGHT: 66 IN

## 2021-01-30 VITALS — HEART RATE: 76 BPM

## 2021-01-30 DIAGNOSIS — Z12.11 SCREEN FOR COLON CANCER: ICD-10-CM

## 2021-01-30 PROCEDURE — 45385 COLONOSCOPY W/LESION REMOVAL: CPT | Performed by: INTERNAL MEDICINE

## 2021-01-30 PROCEDURE — 88305 TISSUE EXAM BY PATHOLOGIST: CPT | Performed by: PATHOLOGY

## 2021-01-30 PROCEDURE — 45380 COLONOSCOPY AND BIOPSY: CPT | Performed by: INTERNAL MEDICINE

## 2021-01-30 RX ORDER — SODIUM CHLORIDE, SODIUM LACTATE, POTASSIUM CHLORIDE, CALCIUM CHLORIDE 600; 310; 30; 20 MG/100ML; MG/100ML; MG/100ML; MG/100ML
125 INJECTION, SOLUTION INTRAVENOUS CONTINUOUS
Status: DISCONTINUED | OUTPATIENT
Start: 2021-01-30 | End: 2021-02-03 | Stop reason: HOSPADM

## 2021-01-30 RX ORDER — LIDOCAINE HYDROCHLORIDE 10 MG/ML
0.5 INJECTION, SOLUTION EPIDURAL; INFILTRATION; INTRACAUDAL; PERINEURAL ONCE AS NEEDED
Status: DISCONTINUED | OUTPATIENT
Start: 2021-01-30 | End: 2021-02-03 | Stop reason: HOSPADM

## 2021-01-30 RX ORDER — GLYCOPYRROLATE 0.2 MG/ML
INJECTION INTRAMUSCULAR; INTRAVENOUS AS NEEDED
Status: DISCONTINUED | OUTPATIENT
Start: 2021-01-30 | End: 2021-01-30

## 2021-01-30 RX ORDER — LIDOCAINE HYDROCHLORIDE 10 MG/ML
INJECTION, SOLUTION EPIDURAL; INFILTRATION; INTRACAUDAL; PERINEURAL AS NEEDED
Status: DISCONTINUED | OUTPATIENT
Start: 2021-01-30 | End: 2021-01-30

## 2021-01-30 RX ORDER — PROPOFOL 10 MG/ML
INJECTION, EMULSION INTRAVENOUS AS NEEDED
Status: DISCONTINUED | OUTPATIENT
Start: 2021-01-30 | End: 2021-01-30

## 2021-01-30 RX ADMIN — PROPOFOL 20 MG: 10 INJECTION, EMULSION INTRAVENOUS at 09:20

## 2021-01-30 RX ADMIN — GLYCOPYRROLATE 0.2 MG: 0.2 INJECTION, SOLUTION INTRAMUSCULAR; INTRAVENOUS at 09:12

## 2021-01-30 RX ADMIN — PROPOFOL 30 MG: 10 INJECTION, EMULSION INTRAVENOUS at 09:18

## 2021-01-30 RX ADMIN — PROPOFOL 100 MG: 10 INJECTION, EMULSION INTRAVENOUS at 09:13

## 2021-01-30 RX ADMIN — PROPOFOL 20 MG: 10 INJECTION, EMULSION INTRAVENOUS at 09:16

## 2021-01-30 RX ADMIN — PROPOFOL 20 MG: 10 INJECTION, EMULSION INTRAVENOUS at 09:23

## 2021-01-30 RX ADMIN — LIDOCAINE HYDROCHLORIDE 50 MG: 10 INJECTION, SOLUTION EPIDURAL; INFILTRATION; INTRACAUDAL; PERINEURAL at 09:13

## 2021-01-30 RX ADMIN — SODIUM CHLORIDE, SODIUM LACTATE, POTASSIUM CHLORIDE, AND CALCIUM CHLORIDE 125 ML/HR: .6; .31; .03; .02 INJECTION, SOLUTION INTRAVENOUS at 09:07

## 2021-01-30 NOTE — H&P
History and Physical -  [facility]  Maida Farah 45 y o  female MRN: [id number]                  HPI: Maida Farah is a 45y o  year old female who presents for initial average risk screening colonoscopy  Asymptomatic  No family history of colon cancer  REVIEW OF SYSTEMS: Per the HPI, and otherwise unremarkable  Historical Information   Past Medical History:   Diagnosis Date    Abnormal Pap smear of cervix     Anxiety     Bradycardia     MDD (major depressive disorder)     PTSD (post-traumatic stress disorder)      Past Surgical History:   Procedure Laterality Date    BREAST CYST EXCISION Left 1984    benign    CERVICAL BIOPSY  W/ LOOP ELECTRODE EXCISION       SECTION  2013    OOPHORECTOMY Bilateral 2015    PARTIAL HYSTERECTOMY  2014    supracervical hysterectomy     Social History   Social History     Substance and Sexual Activity   Alcohol Use Yes    Frequency: Monthly or less    Comment: Occasionally     Social History     Substance and Sexual Activity   Drug Use Yes    Types: Marijuana    Comment: Medical Marijuana Card     Social History     Tobacco Use   Smoking Status Never Smoker   Smokeless Tobacco Never Used     Family History   Problem Relation Age of Onset    No Known Problems Mother     Lung cancer Father     Colon polyps Father     Pancreatic cancer Maternal Grandmother     Heart disease Paternal Grandmother     Hypertension Paternal Grandmother     Breast cancer Maternal Aunt 39    Uterine cancer Maternal Aunt     Breast cancer Maternal Aunt 46    Colon cancer Neg Hx     Ovarian cancer Neg Hx     Cervical cancer Neg Hx        Meds/Allergies     (Not in a hospital admission)      Allergies   Allergen Reactions    Lisinopril Angioedema       Objective     Blood pressure 116/80, pulse 75, temperature 97 6 °F (36 4 °C), temperature source Temporal, resp   rate 18, height 5' 6" (1 676 m), weight 59 7 kg (131 lb 9 8 oz), SpO2 100 %, not currently breastfeeding        PHYSICAL EXAM    Gen: NAD  CV: RRR  CHEST: Clear  ABD: soft, NT/ND  EXT: no edema  Neuro: AAO      ASSESSMENT/PLAN:  This is a 45y o  year old female here for initial average risk screening colonoscopy    PLAN:   Procedure:  Colonoscopy

## 2021-01-30 NOTE — ANESTHESIA PREPROCEDURE EVALUATION
Procedure:  COLONOSCOPY    Relevant Problems   CARDIO   (+) Breast pain   (+) Essential hypertension   (+) Mixed hyperlipidemia      GI/HEPATIC   (+) Gastroesophageal reflux disease without esophagitis      NEURO/PSYCH   (+) Anxiety   (+) MDD (major depressive disorder)   (+) PTSD (post-traumatic stress disorder)     CAD/PCI/MI/CHF -- denies, > 4 mets w/o SOB  COPD/ASTHMA/DANYELL -- denies  PROBS WITH PRIOR ANESTHESIA -- denies  NPO STATUS CONFIRMED    Lab Results   Component Value Date    WBC 5 60 12/28/2020    HGB 12 9 12/28/2020     12/28/2020     Lab Results   Component Value Date    SODIUM 142 12/28/2020    K 4 2 12/28/2020    BUN 13 12/28/2020    CREATININE 0 90 12/28/2020    EGFR 74 12/28/2020     No results found for: PTT   No results found for: INR      No results found for: HGBA1C           Physical Exam    Airway    Mallampati score: II  TM Distance: >3 FB       Dental   No notable dental hx     Cardiovascular      Pulmonary      Other Findings        Anesthesia Plan  ASA Score- 2     Anesthesia Type- IV sedation with anesthesia with ASA Monitors  Additional Monitors:   Airway Plan:     Comment: DAVID Salas , have personally seen and evaluated the patient prior to anesthetic care  I have reviewed the pre-anesthetic record, and other medical records if appropriate to the anesthetic care  If a CRNA is involved in the case, I have reviewed the CRNA assessment, if present, and agree  Risks/benefits and alternatives discussed with patient including possible PONV, sore throat, and possibility of rare anesthetic and surgical emergencies          Plan Factors-    Chart reviewed  EKG reviewed  Imaging results reviewed  Existing labs reviewed  Patient summary reviewed  Induction-     Postoperative Plan-     Informed Consent- Anesthetic plan and risks discussed with patient  I personally reviewed this patient with the CRNA   Discussed and agreed on the Anesthesia Plan with the JAQUELINE Patricio

## 2021-01-30 NOTE — DISCHARGE INSTRUCTIONS
Colonoscopy   WHAT YOU NEED TO KNOW:   A colonoscopy is a procedure to examine the inside of your colon (intestine) with a scope  Polyps or tissue growths may have been removed during your colonoscopy  It is normal to feel bloated and to have some abdominal discomfort  You should be passing gas  If you have hemorrhoids or you had polyps removed, you may have a small amount of bleeding  DISCHARGE INSTRUCTIONS:   Seek care immediately if:   · You have a large amount of bright red blood in your bowel movements  · Your abdomen is hard and firm and you have severe pain  · You have sudden trouble breathing  Contact your healthcare provider if:   · You develop a rash or hives  · You have a fever within 24 hours of your procedure       · You have not had a bowel movement for 3 days after your procedure  · You have questions or concerns about your condition or care  Activity:   · Do not lift, strain, or run  for 3 days after your procedure  · Rest after your procedure  You have been given medicine to relax you  Do not  drive or make important decisions until the day after your procedure  Return to your normal activity as directed  · Relieve gas and discomfort from bloating  by lying on your right side with a heating pad on your abdomen  You may need to take short walks to help the gas move out  Eat small meals until bloating is relieved  If you had polyps removed: For 7 days after your procedure:  · Do not  take aspirin  · Do not  go on long car rides  Follow up with your healthcare provider as directed:  Write down your questions so you remember to ask them during your visits  © 2017 [address] is for End User's use only and may not be sold, redistributed or otherwise used for commercial purposes  All illustrations and images included in CareNotes® are the copyrighted property of [employer]  or [employer]    The above information is an  only  It is not intended as medical advice for individual conditions or treatments  Talk to your doctor, nurse or pharmacist before following any medical regimen to see if it is safe and effective for you

## 2021-02-16 ENCOUNTER — APPOINTMENT (OUTPATIENT)
Dept: LAB | Facility: CLINIC | Age: 51
End: 2021-02-16
Payer: COMMERCIAL

## 2021-02-16 ENCOUNTER — OFFICE VISIT (OUTPATIENT)
Dept: OBGYN CLINIC | Age: 51
End: 2021-02-16
Payer: COMMERCIAL

## 2021-02-16 VITALS
WEIGHT: 136 LBS | BODY MASS INDEX: 21.35 KG/M2 | DIASTOLIC BLOOD PRESSURE: 84 MMHG | HEIGHT: 67 IN | SYSTOLIC BLOOD PRESSURE: 118 MMHG

## 2021-02-16 DIAGNOSIS — Z11.3 SCREENING EXAMINATION FOR STD (SEXUALLY TRANSMITTED DISEASE): ICD-10-CM

## 2021-02-16 DIAGNOSIS — N76.0 ACUTE VAGINITIS: Primary | ICD-10-CM

## 2021-02-16 PROCEDURE — 87661 TRICHOMONAS VAGINALIS AMPLIF: CPT

## 2021-02-16 PROCEDURE — 86592 SYPHILIS TEST NON-TREP QUAL: CPT

## 2021-02-16 PROCEDURE — 86803 HEPATITIS C AB TEST: CPT

## 2021-02-16 PROCEDURE — 87491 CHLMYD TRACH DNA AMP PROBE: CPT | Performed by: NURSE PRACTITIONER

## 2021-02-16 PROCEDURE — 87210 SMEAR WET MOUNT SALINE/INK: CPT | Performed by: NURSE PRACTITIONER

## 2021-02-16 PROCEDURE — 87340 HEPATITIS B SURFACE AG IA: CPT

## 2021-02-16 PROCEDURE — 87591 N.GONORRHOEAE DNA AMP PROB: CPT | Performed by: NURSE PRACTITIONER

## 2021-02-16 PROCEDURE — 87389 HIV-1 AG W/HIV-1&-2 AB AG IA: CPT

## 2021-02-16 PROCEDURE — 1036F TOBACCO NON-USER: CPT | Performed by: NURSE PRACTITIONER

## 2021-02-16 PROCEDURE — 99213 OFFICE O/P EST LOW 20 MIN: CPT | Performed by: NURSE PRACTITIONER

## 2021-02-16 PROCEDURE — 87661 TRICHOMONAS VAGINALIS AMPLIF: CPT | Performed by: NURSE PRACTITIONER

## 2021-02-16 PROCEDURE — 36415 COLL VENOUS BLD VENIPUNCTURE: CPT

## 2021-02-16 PROCEDURE — 3008F BODY MASS INDEX DOCD: CPT | Performed by: NURSE PRACTITIONER

## 2021-02-16 RX ORDER — METRONIDAZOLE 7.5 MG/G
1 GEL VAGINAL
Qty: 45 G | Refills: 0 | Status: SHIPPED | OUTPATIENT
Start: 2021-02-16 | End: 2021-02-21

## 2021-02-16 NOTE — PATIENT INSTRUCTIONS
Vaginal Discharge   WHAT YOU NEED TO KNOW:   Vaginal discharge is normal  It is usually clear or white and odorless  Vaginal discharge is your body's way of cleaning your vagina so it is healthy  Irritation, itching, burning, or a change in the amount, smell, or color may indicate a problem  DISCHARGE INSTRUCTIONS:   Contact your healthcare provider or gynecologist if:   · You have swelling, burning, itching, or irritation in or around your vagina  · You have an increase in the amount of discharge  · The color or smell of your discharge changes  · Your discharge looks similar to cottage cheese  · Your discharge is bloody and it is not your monthly period  · You have pain during sexual intercourse  · You have trouble urinating, or you urinate often and with urgency  · You have abdominal pain or cramps  · You have a fever or chills  · You have low back pain or side pain  · You have questions or concerns about your condition or care  Keep your vagina healthy:   · Always wipe from front to back  after you use the toilet  This prevents spreading bacteria from your rectal area into your vagina  · Clean in and around your vagina with mild soap and warm water each day  Gently dry the area after washing  Do not use hot tubs  The heat and moisture from hot tubs can increase your risk for another yeast infection  · Do not wear tight-fitting clothes or undergarments  for long periods  Wear cotton underwear during the day  Cotton helps keep your genital area dry and does not hold in warmth or moisture  Do not wear underwear at night  · Change your laundry soap or fabric softener  if you think it is irritating your skin  · Do not douche  or use feminine hygiene sprays or bubble bath  Do not use pads or tampons that are scented, or colored or perfumed toilet paper  · Ask your healthcare provider about birth control options if necessary    Condoms have latex and diaphragms have gel that kill sperm  Both of these may irritate your genital area  Follow up with your healthcare provider as directed:  Write down your questions so you remember to ask them during your visits  © Copyright [address] Information is for End User's use only and may not be sold, redistributed or otherwise used for commercial purposes  All illustrations and images included in CareNotes® are the copyrighted property of A [employer]  or [facility] Chavez   The above information is an  only  It is not intended as medical advice for individual conditions or treatments  Talk to your doctor, nurse or pharmacist before following any medical regimen to see if it is safe and effective for you

## 2021-02-16 NOTE — PROGRESS NOTES
Diagnoses and all orders for this visit:    Acute vaginitis  -     POCT wet mount  -     metroNIDAZOLE (METROGEL) 0 75 % vaginal gel; Insert 1 application into the vagina daily at bedtime for 5 days    Screening examination for STD (sexually transmitted disease)  -     RPR; Future  -     Human Immunodeficiency Virus 1/2 Antigen / Antibody ( Fourth Generation) with Reflex Testing; Future  -     Hepatitis B surface antigen; Future  -     Hepatitis C antibody; Future  -     Trichomonas Vaginalis, KIKO; Future  -     Chlamydia/GC amplified DNA by PCR  -     Trichomonas Vaginalis, KIKO        Call if no symptom improvement, all questions answered, return for annual         Pleasant 45 y o  here for vaginal complaints of fishy odor and clear discharge x 1 day  Her  was unfaithful years ago but she would like all STD tests  She denies any treatments tried  Denies recent antibiotic use  Denies douching  Denies fever, pelvic pain or dyspareunia  Denies new sexual partners      Past Medical History:   Diagnosis Date    Abnormal Pap smear of cervix     Anxiety     Bradycardia     MDD (major depressive disorder)     PTSD (post-traumatic stress disorder)      Past Surgical History:   Procedure Laterality Date    BREAST CYST EXCISION Left     benign    CERVICAL BIOPSY  W/ LOOP ELECTRODE EXCISION       SECTION  2013    OOPHORECTOMY Bilateral 2015    PARTIAL HYSTERECTOMY  2014    supracervical hysterectomy     Social History     Tobacco Use    Smoking status: Never Smoker    Smokeless tobacco: Never Used   Substance Use Topics    Alcohol use: Yes     Frequency: Monthly or less     Comment: Occasionally    Drug use: Yes     Types: Marijuana     Comment: Medical Marijuana Card     Family History   Problem Relation Age of Onset    No Known Problems Mother     Lung cancer Father     Colon polyps Father     Pancreatic cancer Maternal Grandmother     Heart disease Paternal Grandmother     Hypertension Paternal Grandmother     Breast cancer Maternal Aunt 39    Uterine cancer Maternal Aunt     Breast cancer Maternal Aunt 46    Colon cancer Neg Hx     Ovarian cancer Neg Hx     Cervical cancer Neg Hx        Current Outpatient Medications:     acyclovir (ZOVIRAX) 5 % ointment, Apply topically every 3 (three) hours, Disp: 30 g, Rfl: 0    amLODIPine (NORVASC) 5 mg tablet, Take 5 mg by mouth daily, Disp: , Rfl: 0    citalopram (CeleXA) 40 mg tablet, Take 40 mg by mouth daily, Disp: , Rfl: 0    clonazePAM (KlonoPIN) 1 mg tablet, Take 1 mg by mouth 3 (three) times a day, Disp: , Rfl: 0    dronabinol (MARINOL) 2 5 mg capsule, Take 1 capsule (2 5 mg total) by mouth 2 (two) times a day, Disp: 60 capsule, Rfl: 0    estradiol (VAGIFEM, YUVAFEM) 10 MCG TABS vaginal tablet, Place 1 tablet in the vagina daily for 2 weeks, followed by twice weekly, Disp: 30 tablet, Rfl: 4    pantoprazole (PROTONIX) 40 mg tablet, TAKE 1 TABLET BY MOUTH EVERY DAY, Disp: , Rfl:     propranolol (INDERAL) 20 mg tablet, , Disp: , Rfl:     traZODone (DESYREL) 100 mg tablet, Take 50 mg by mouth daily at bedtime as needed  , Disp: , Rfl:     acyclovir (ZOVIRAX) 400 MG tablet, Take 1 tablet (400 mg total) by mouth 5 (five) times a day, Disp: 30 tablet, Rfl: 0    metroNIDAZOLE (METROGEL) 0 75 % vaginal gel, Insert 1 application into the vagina daily at bedtime for 5 days, Disp: 45 g, Rfl: 0    Allergies   Allergen Reactions    Lisinopril Angioedema     OB History    Para Term  AB Living   4 2 2   1 2   SAB TAB Ectopic Multiple Live Births   1              # Outcome Date GA Lbr Joby/2nd Weight Sex Delivery Anes PTL Lv   4             3 SAB            2 Term            1 Term                Vitals:    21 1602   BP: 118/84   BP Location: Right arm   Patient Position: Sitting   Cuff Size: Standard   Weight: 61 7 kg (136 lb)   Height: 5' 7 2" (1 707 m)     Body mass index is 21 17 kg/m²      Review of Systems Constitutional: Negative for chills, fatigue, fever and unexpected weight change  Respiratory: Negative for shortness of breath  Gastrointestinal: Negative for anal bleeding, blood in stool, constipation and diarrhea  Genitourinary: Negative for difficulty urinating, dysuria and hematuria  Physical Exam   Constitutional: She appears well-developed and well-nourished  No distress  Alert and oriented  HENT: atraumatic  Head: Normocephalic  Neck: Normal range of motion  Neck supple  Pulmonary: Effort normal   Abdominal: Soft  Pelvic exam was performed with patient supine  No labial fusion  There is no rash, tenderness, lesion or injury on the right labia  There is no rash, tenderness, lesion or injury on the left labia  Urethral meatus does not show any tenderness, inflammation or discharge  Palpation of midline bladder without pain or discomfort  Uterus is not deviated, not enlarged, not fixed and not tender  Cervix exhibits no motion tenderness, no discharge and no friability  Right adnexum displays no mass, no tenderness and no fullness  Left adnexum displays no mass, no tenderness and no fullness  No erythema or tenderness in the vagina  No foreign body in the vagina  No signs of injury around the vagina  Vaginal discharge found  Perineum and anus without areas of injury  No lesions noted or swelling  Lymphadenopathy:        Right: No inguinal adenopathy present  Left: No inguinal adenopathy present       Wet mount showed +hyphae, ph 4 5, no wbcs or trich, whiff neg

## 2021-02-17 LAB
HBV SURFACE AG SER QL: NORMAL
HCV AB SER QL: NORMAL
HIV 1+2 AB+HIV1 P24 AG SERPL QL IA: NORMAL
RPR SER QL: NORMAL

## 2021-02-19 LAB
C TRACH DNA SPEC QL NAA+PROBE: NEGATIVE
N GONORRHOEA DNA SPEC QL NAA+PROBE: NEGATIVE
T VAGINALIS RRNA SPEC QL NAA+PROBE: NEGATIVE
T VAGINALIS RRNA SPEC QL NAA+PROBE: NEGATIVE

## 2021-03-25 DIAGNOSIS — B00.9 HSV (HERPES SIMPLEX VIRUS) INFECTION: ICD-10-CM

## 2021-03-25 RX ORDER — ACYCLOVIR 50 MG/G
OINTMENT TOPICAL 4 TIMES DAILY
Qty: 30 G | Refills: 0 | Status: SHIPPED | OUTPATIENT
Start: 2021-03-25 | End: 2021-10-13 | Stop reason: SDUPTHER

## 2021-03-25 RX ORDER — ACYCLOVIR 400 MG/1
400 TABLET ORAL
Qty: 30 TABLET | Refills: 0 | Status: SHIPPED | OUTPATIENT
Start: 2021-03-25 | End: 2021-05-05

## 2021-03-25 NOTE — TELEPHONE ENCOUNTER
----- Message from [facility] sent at 3/25/2021 11:57 AM EDT -----  Regarding: RE:Results  Contact: [phone]  Please send a prescription of zovorax both pills and cream please

## 2021-04-05 ENCOUNTER — CONSULT (OUTPATIENT)
Dept: NEUROLOGY | Facility: CLINIC | Age: 51
End: 2021-04-05
Payer: COMMERCIAL

## 2021-04-05 VITALS
WEIGHT: 129 LBS | HEIGHT: 67 IN | DIASTOLIC BLOOD PRESSURE: 82 MMHG | HEART RATE: 63 BPM | BODY MASS INDEX: 20.25 KG/M2 | SYSTOLIC BLOOD PRESSURE: 120 MMHG

## 2021-04-05 DIAGNOSIS — G43.109 MIGRAINE WITH AURA AND WITHOUT STATUS MIGRAINOSUS, NOT INTRACTABLE: ICD-10-CM

## 2021-04-05 PROCEDURE — 3008F BODY MASS INDEX DOCD: CPT | Performed by: PSYCHIATRY & NEUROLOGY

## 2021-04-05 PROCEDURE — 99244 OFF/OP CNSLTJ NEW/EST MOD 40: CPT | Performed by: PSYCHIATRY & NEUROLOGY

## 2021-04-05 PROCEDURE — 1036F TOBACCO NON-USER: CPT | Performed by: PSYCHIATRY & NEUROLOGY

## 2021-04-05 NOTE — PROGRESS NOTES
Gwendolyn Oneil is a 45 y o  female  Who presents today with complaints of vision disturbance and headache    Assessment:  1  Migraine with aura and without status migrainosus, not intractable        Plan:   follow-up 4 months    Discussion:  Joli Schlatter has migraine with aura typically manifest as a vision disturbance  Her headaches are happening infrequently but recently had 2 headaches where her vision disturbance was more prominent with minimal or mild headache  Suspect all migraine with aura  We did discuss triggers for migraine and have recommended keeping a headache calendar  Her neurologic exam is nonfocal and CT scan of her head performed a few months ago was unremarkable  I will plan to see her back in follow-up in 3 or 4 months      Subjective:    NATALIIA ellis is a right-handed woman who presents with the above complaints  She states that 2 or 3 months ago she was riding in the car with her  when suddenly she noticed a disturbance in her vision  She states she was seeing bright flashes of light black and white in her periphery which progressed to a tunneling of her vision  She states the vision disturbance lasted for more than an hour  She was seen in the emergency room where a CT scan of her head done was normal she states that it is a vision disturbance resolved she developed a mild bifrontal headache  She states it was not bad enough to take any medication for it  There was no photophobia sonophobia or nausea with the headache  She states that about a month later she was at the [employer] when she had a similar vision disturbance that again lasted for over an hour and this time was associated with a throbbing headache at the vertex associated with some photophobia and nausea  She states she took 1 of her medications that she takes for her migraine ( a product with caffeine) and the headache went away    She states that over the last 5 years she gets headaches with a milder vision disturbance preceding at a few times a year  She states that prior to that, when she was working she was getting headaches several days a week  She states that her headaches are typically associated with flashing lights typically in the periphery without tunneling of her vision  She states she has used Imitrex injection in the past which was effective in helping her headache  She reports that her father had a history of migraine headache and her daughter has a history of migraine  She has not noticed any thing that triggers her headaches   She states she was placed on propranolol by her psychiatrist for anxiety issues and has not been on a prophylactic medication  for migraine in the past       Past Medical History:   Diagnosis Date    Abnormal Pap smear of cervix     Anxiety     Bradycardia     MDD (major depressive disorder)     PTSD (post-traumatic stress disorder)        Family History:  Family History   Problem Relation Age of Onset    Diabetes Mother     Lung cancer Father         non smoker    Colon polyps Father     Pancreatic cancer Maternal Grandmother     Heart disease Paternal Grandmother     Hypertension Paternal Grandmother     Breast cancer Maternal Aunt 39    Uterine cancer Maternal Aunt     Breast cancer Maternal Aunt 46    Stroke Sister     No Known Problems Brother     Other Sister         Hydrocephalus    Colon cancer Neg Hx     Ovarian cancer Neg Hx     Cervical cancer Neg Hx        Past Surgical History:  Past Surgical History:   Procedure Laterality Date    BREAST CYST EXCISION Left 1984    benign    CERVICAL BIOPSY  W/ LOOP ELECTRODE EXCISION       SECTION  2013    OOPHORECTOMY Bilateral 2015    PARTIAL HYSTERECTOMY  2014    supracervical hysterectomy       Social History:   reports that she has never smoked  She has never used smokeless tobacco  She reports previous alcohol use  She reports current drug use   Drug: Marijuana  Allergies:  Lisinopril      Current Outpatient Medications:     acyclovir (ZOVIRAX) 400 MG tablet, Take 1 tablet (400 mg total) by mouth 5 (five) times a day (Patient taking differently: Take 400 mg by mouth 5 (five) times a day As needed), Disp: 30 tablet, Rfl: 0    acyclovir (ZOVIRAX) 5 % ointment, Apply topically 4 (four) times a day (Patient taking differently: Apply topically 4 (four) times a day as needed ), Disp: 30 g, Rfl: 0    amLODIPine (NORVASC) 5 mg tablet, Take 5 mg by mouth daily, Disp: , Rfl: 0    citalopram (CeleXA) 40 mg tablet, Take 40 mg by mouth daily, Disp: , Rfl: 0    clonazePAM (KlonoPIN) 1 mg tablet, Take 1 mg by mouth 3 (three) times a day, Disp: , Rfl: 0    estradiol (VAGIFEM, YUVAFEM) 10 MCG TABS vaginal tablet, Place 1 tablet in the vagina daily for 2 weeks, followed by twice weekly, Disp: 30 tablet, Rfl: 4    NON FORMULARY, Medical Marijuana, Disp: , Rfl:     pantoprazole (PROTONIX) 40 mg tablet, TAKE 1 TABLET BY MOUTH EVERY DAY, Disp: , Rfl:     propranolol (INDERAL) 20 mg tablet, , Disp: , Rfl:     traZODone (DESYREL) 100 mg tablet, Take 50 mg by mouth daily at bedtime as needed  , Disp: , Rfl:     dronabinol (MARINOL) 2 5 mg capsule, Take 1 capsule (2 5 mg total) by mouth 2 (two) times a day (Patient not taking: Reported on 4/5/2021), Disp: 60 capsule, Rfl: 0     I have reviewed the past medical, social and family history, current medications, allergies, vitals, review of systems and updated this information as appropriate today     Objective:    Vitals:  Blood pressure 120/82, pulse 63, height 5' 6 75" (1 695 m), weight 58 5 kg (129 lb), not currently breastfeeding  Physical Exam    Neurological Exam    GENERAL:  Cooperative in no acute distress  Well-developed and well-nourished    HEAD and NECK   Head is atraumatic normocephalic with no lesions or masses   Neck is supple with full range of motion    CARDIOVASCULAR  Carotid Arteries-no carotid bruits  NEUROLOGIC:  Mental Status-the patient is awake alert and oriented without aphasia or apraxia  Cranial Nerves: Visual fields are full to confrontation  Discs are flat  Extraocular movements are full without nystagmus  Pupils are 2-1/2 mm and reactive  Face is symmetrical to light touch  Movements of facial expression move symmetrically  Hearing is normal to finger rub bilaterally  Soft palate lifts symmetrically  Shoulder shrug is symmetrical  Tongue is midline without atrophy  Motor: No drift is noted on arm extension  Strength is full in the upper and lower extremities with normal bulk and tone  Sensory: Intact to temperature and vibratory sensation in the upper and lower extremities bilaterally  Cortical function is intact  Coordination: Finger to nose testing is performed accurately  Romberg is negative  Gait reveals a normal base with symmetrical arm swing  Tandem walk is normal   Reflexes:  2/4 and symmetrical in the biceps, triceps, brachioradialis, knee jerk and ankle jerk regions  Toes are downgoing            ROS:    Review of Systems   Constitutional: Negative  Negative for appetite change and fever  HENT: Negative  Negative for hearing loss, tinnitus, trouble swallowing and voice change  Eyes: Negative for photophobia, pain and visual disturbance  Respiratory: Negative  Negative for shortness of breath  Cardiovascular: Negative  Negative for palpitations  Gastrointestinal: Negative  Negative for nausea and vomiting  Endocrine: Negative  Negative for cold intolerance  Genitourinary: Negative  Negative for dysuria, frequency and urgency  Musculoskeletal: Negative  Negative for myalgias and neck pain  Skin: Negative  Negative for rash  Neurological: Positive for tremors  Negative for dizziness, seizures, syncope, facial asymmetry, speech difficulty, weakness, light-headedness, numbness and headaches  Hematological: Negative  Does not bruise/bleed easily  Psychiatric/Behavioral: Negative for confusion, hallucinations and sleep disturbance  The patient is nervous/anxious (situational)

## 2021-04-05 NOTE — LETTER
April 5, 2021     Fred Soto MD  [address] [place] [address]    Patient: Zaid Arenas   YOB: 1970   Date of Visit: 4/5/2021       Dear Dr Lenny Delgado:    Thank you for referring Zaid Arenas to me for evaluation  Below are my notes for this consultation  If you have questions, please do not hesitate to call me  I look forward to following your patient along with you  Sincerely,        Jesus Castanon MD        CC: No Recipients  Jesus Castanon MD  4/5/2021 10:26 AM  Signed  Zaid Arenas is a 45 y o  female  Who presents today with complaints of vision disturbance and headache    Assessment:  1  Migraine with aura and without status migrainosus, not intractable        Plan:   follow-up 4 months    Discussion:  Jose Ku has migraine with aura typically manifest as a vision disturbance  Her headaches are happening infrequently but recently had 2 headaches where her vision disturbance was more prominent with minimal or mild headache  Suspect all migraine with aura  We did discuss triggers for migraine and have recommended keeping a headache calendar  Her neurologic exam is nonfocal and CT scan of her head performed a few months ago was unremarkable  I will plan to see her back in follow-up in 3 or 4 months      Subjective:    NATALIIA ellis is a right-handed woman who presents with the above complaints  She states that 2 or 3 months ago she was riding in the car with her  when suddenly she noticed a disturbance in her vision  She states she was seeing bright flashes of light black and white in her periphery which progressed to a tunneling of her vision  She states the vision disturbance lasted for more than an hour  She was seen in the emergency room where a CT scan of her head done was normal she states that it is a vision disturbance resolved she developed a mild bifrontal headache  She states it was not bad enough to take any medication for it    There was no photophobia sonophobia or nausea with the headache  She states that about a month later she was at the [facility] when she had a similar vision disturbance that again lasted for over an hour and this time was associated with a throbbing headache at the vertex associated with some photophobia and nausea  She states she took 1 of her medications that she takes for her migraine ( a product with caffeine) and the headache went away  She states that over the last 5 years she gets headaches with a milder vision disturbance preceding at a few times a year  She states that prior to that, when she was working she was getting headaches several days a week  She states that her headaches are typically associated with flashing lights typically in the periphery without tunneling of her vision  She states she has used Imitrex injection in the past which was effective in helping her headache  She reports that her father had a history of migraine headache and her daughter has a history of migraine  She has not noticed any thing that triggers her headaches     She states she was placed on propranolol by her psychiatrist for anxiety issues and has not been on a prophylactic medication  for migraine in the past       Past Medical History:   Diagnosis Date    Abnormal Pap smear of cervix     Anxiety     Bradycardia     MDD (major depressive disorder)     PTSD (post-traumatic stress disorder)        Family History:  Family History   Problem Relation Age of Onset    Diabetes Mother     Lung cancer Father         non smoker    Colon polyps Father     Pancreatic cancer Maternal Grandmother     Heart disease Paternal Grandmother     Hypertension Paternal Grandmother     Breast cancer Maternal Aunt 39    Uterine cancer Maternal Aunt     Breast cancer Maternal Aunt 46    Stroke Sister     No Known Problems Brother     Other Sister         Hydrocephalus    Colon cancer Neg Hx     Ovarian cancer Neg Hx     Cervical cancer Neg Hx        Past Surgical History:  Past Surgical History:   Procedure Laterality Date    BREAST CYST EXCISION Left 1984    benign    CERVICAL BIOPSY  W/ LOOP ELECTRODE EXCISION       SECTION  2013    OOPHORECTOMY Bilateral 2015    PARTIAL HYSTERECTOMY  2014    supracervical hysterectomy       Social History:   reports that she has never smoked  She has never used smokeless tobacco  She reports previous alcohol use  She reports current drug use  Drug: Marijuana      Allergies:  Lisinopril      Current Outpatient Medications:     acyclovir (ZOVIRAX) 400 MG tablet, Take 1 tablet (400 mg total) by mouth 5 (five) times a day (Patient taking differently: Take 400 mg by mouth 5 (five) times a day As needed), Disp: 30 tablet, Rfl: 0    acyclovir (ZOVIRAX) 5 % ointment, Apply topically 4 (four) times a day (Patient taking differently: Apply topically 4 (four) times a day as needed ), Disp: 30 g, Rfl: 0    amLODIPine (NORVASC) 5 mg tablet, Take 5 mg by mouth daily, Disp: , Rfl: 0    citalopram (CeleXA) 40 mg tablet, Take 40 mg by mouth daily, Disp: , Rfl: 0    clonazePAM (KlonoPIN) 1 mg tablet, Take 1 mg by mouth 3 (three) times a day, Disp: , Rfl: 0    estradiol (VAGIFEM, YUVAFEM) 10 MCG TABS vaginal tablet, Place 1 tablet in the vagina daily for 2 weeks, followed by twice weekly, Disp: 30 tablet, Rfl: 4    NON FORMULARY, Medical Marijuana, Disp: , Rfl:     pantoprazole (PROTONIX) 40 mg tablet, TAKE 1 TABLET BY MOUTH EVERY DAY, Disp: , Rfl:     propranolol (INDERAL) 20 mg tablet, , Disp: , Rfl:     traZODone (DESYREL) 100 mg tablet, Take 50 mg by mouth daily at bedtime as needed  , Disp: , Rfl:     dronabinol (MARINOL) 2 5 mg capsule, Take 1 capsule (2 5 mg total) by mouth 2 (two) times a day (Patient not taking: Reported on 2021), Disp: 60 capsule, Rfl: 0     I have reviewed the past medical, social and family history, current medications, allergies, vitals, review of systems and updated this information as appropriate today     Objective:    Vitals:  Blood pressure 120/82, pulse 63, height 5' 6 75" (1 695 m), weight 58 5 kg (129 lb), not currently breastfeeding  Physical Exam    Neurological Exam    GENERAL:  Cooperative in no acute distress  Well-developed and well-nourished    HEAD and NECK   Head is atraumatic normocephalic with no lesions or masses  Neck is supple with full range of motion    CARDIOVASCULAR  Carotid Arteries-no carotid bruits  NEUROLOGIC:  Mental Status-the patient is awake alert and oriented without aphasia or apraxia  Cranial Nerves: Visual fields are full to confrontation  Discs are flat  Extraocular movements are full without nystagmus  Pupils are 2-1/2 mm and reactive  Face is symmetrical to light touch  Movements of facial expression move symmetrically  Hearing is normal to finger rub bilaterally  Soft palate lifts symmetrically  Shoulder shrug is symmetrical  Tongue is midline without atrophy  Motor: No drift is noted on arm extension  Strength is full in the upper and lower extremities with normal bulk and tone  Sensory: Intact to temperature and vibratory sensation in the upper and lower extremities bilaterally  Cortical function is intact  Coordination: Finger to nose testing is performed accurately  Romberg is negative  Gait reveals a normal base with symmetrical arm swing  Tandem walk is normal   Reflexes:  2/4 and symmetrical in the biceps, triceps, brachioradialis, knee jerk and ankle jerk regions  Toes are downgoing            ROS:    Review of Systems   Constitutional: Negative  Negative for appetite change and fever  HENT: Negative  Negative for hearing loss, tinnitus, trouble swallowing and voice change  Eyes: Negative for photophobia, pain and visual disturbance  Respiratory: Negative  Negative for shortness of breath  Cardiovascular: Negative  Negative for palpitations  Gastrointestinal: Negative  Negative for nausea and vomiting  Endocrine: Negative  Negative for cold intolerance  Genitourinary: Negative  Negative for dysuria, frequency and urgency  Musculoskeletal: Negative  Negative for myalgias and neck pain  Skin: Negative  Negative for rash  Neurological: Positive for tremors  Negative for dizziness, seizures, syncope, facial asymmetry, speech difficulty, weakness, light-headedness, numbness and headaches  Hematological: Negative  Does not bruise/bleed easily  Psychiatric/Behavioral: Negative for confusion, hallucinations and sleep disturbance  The patient is nervous/anxious (situational)

## 2021-04-13 ENCOUNTER — TELEPHONE (OUTPATIENT)
Dept: INTERNAL MEDICINE CLINIC | Facility: CLINIC | Age: 51
End: 2021-04-13

## 2021-04-13 DIAGNOSIS — E78.2 MIXED HYPERLIPIDEMIA: Primary | ICD-10-CM

## 2021-05-04 DIAGNOSIS — B00.9 HSV (HERPES SIMPLEX VIRUS) INFECTION: ICD-10-CM

## 2021-05-05 DIAGNOSIS — F43.10 PTSD (POST-TRAUMATIC STRESS DISORDER): ICD-10-CM

## 2021-05-05 RX ORDER — ACYCLOVIR 400 MG/1
400 TABLET ORAL 2 TIMES DAILY
Qty: 90 TABLET | Refills: 1 | Status: SHIPPED | OUTPATIENT
Start: 2021-05-05 | End: 2021-07-26 | Stop reason: SDUPTHER

## 2021-05-05 RX ORDER — ACYCLOVIR 400 MG/1
400 TABLET ORAL 3 TIMES DAILY
Qty: 30 TABLET | Refills: 0 | Status: SHIPPED | OUTPATIENT
Start: 2021-05-05 | End: 2021-05-05 | Stop reason: ALTCHOICE

## 2021-05-05 RX ORDER — DRONABINOL 2.5 MG/1
2.5 CAPSULE ORAL 2 TIMES DAILY
Qty: 60 CAPSULE | Refills: 0 | OUTPATIENT
Start: 2021-05-05

## 2021-05-14 ENCOUNTER — APPOINTMENT (OUTPATIENT)
Dept: LAB | Facility: CLINIC | Age: 51
End: 2021-05-14
Payer: COMMERCIAL

## 2021-05-14 DIAGNOSIS — E78.2 MIXED HYPERLIPIDEMIA: ICD-10-CM

## 2021-05-14 LAB
ALBUMIN SERPL BCP-MCNC: 4.1 G/DL (ref 3.5–5)
ALP SERPL-CCNC: 68 U/L (ref 46–116)
ALT SERPL W P-5'-P-CCNC: 18 U/L (ref 12–78)
ANION GAP SERPL CALCULATED.3IONS-SCNC: 3 MMOL/L (ref 4–13)
AST SERPL W P-5'-P-CCNC: 7 U/L (ref 5–45)
BASOPHILS # BLD AUTO: 0.05 THOUSANDS/ΜL (ref 0–0.1)
BASOPHILS NFR BLD AUTO: 1 % (ref 0–1)
BILIRUB SERPL-MCNC: 1.41 MG/DL (ref 0.2–1)
BUN SERPL-MCNC: 10 MG/DL (ref 5–25)
CALCIUM SERPL-MCNC: 9.3 MG/DL (ref 8.3–10.1)
CHLORIDE SERPL-SCNC: 108 MMOL/L (ref 100–108)
CHOLEST SERPL-MCNC: 226 MG/DL (ref 50–200)
CO2 SERPL-SCNC: 29 MMOL/L (ref 21–32)
CREAT SERPL-MCNC: 0.85 MG/DL (ref 0.6–1.3)
EOSINOPHIL # BLD AUTO: 0.18 THOUSAND/ΜL (ref 0–0.61)
EOSINOPHIL NFR BLD AUTO: 3 % (ref 0–6)
ERYTHROCYTE [DISTWIDTH] IN BLOOD BY AUTOMATED COUNT: 13.7 % (ref 11.6–15.1)
GFR SERPL CREATININE-BSD FRML MDRD: 80 ML/MIN/1.73SQ M
GLUCOSE P FAST SERPL-MCNC: 86 MG/DL (ref 65–99)
HCT VFR BLD AUTO: 41.3 % (ref 34.8–46.1)
HDLC SERPL-MCNC: 63 MG/DL
HGB BLD-MCNC: 13.5 G/DL (ref 11.5–15.4)
IMM GRANULOCYTES # BLD AUTO: 0.01 THOUSAND/UL (ref 0–0.2)
IMM GRANULOCYTES NFR BLD AUTO: 0 % (ref 0–2)
LDLC SERPL CALC-MCNC: 147 MG/DL (ref 0–100)
LYMPHOCYTES # BLD AUTO: 1.77 THOUSANDS/ΜL (ref 0.6–4.47)
LYMPHOCYTES NFR BLD AUTO: 26 % (ref 14–44)
MCH RBC QN AUTO: 29.6 PG (ref 26.8–34.3)
MCHC RBC AUTO-ENTMCNC: 32.7 G/DL (ref 31.4–37.4)
MCV RBC AUTO: 91 FL (ref 82–98)
MONOCYTES # BLD AUTO: 0.55 THOUSAND/ΜL (ref 0.17–1.22)
MONOCYTES NFR BLD AUTO: 8 % (ref 4–12)
NEUTROPHILS # BLD AUTO: 4.19 THOUSANDS/ΜL (ref 1.85–7.62)
NEUTS SEG NFR BLD AUTO: 62 % (ref 43–75)
NONHDLC SERPL-MCNC: 163 MG/DL
NRBC BLD AUTO-RTO: 0 /100 WBCS
PLATELET # BLD AUTO: 232 THOUSANDS/UL (ref 149–390)
PMV BLD AUTO: 10.4 FL (ref 8.9–12.7)
POTASSIUM SERPL-SCNC: 4.1 MMOL/L (ref 3.5–5.3)
PROT SERPL-MCNC: 7.6 G/DL (ref 6.4–8.2)
RBC # BLD AUTO: 4.56 MILLION/UL (ref 3.81–5.12)
SODIUM SERPL-SCNC: 140 MMOL/L (ref 136–145)
TRIGL SERPL-MCNC: 79 MG/DL
TSH SERPL DL<=0.05 MIU/L-ACNC: 0.75 UIU/ML (ref 0.36–3.74)
WBC # BLD AUTO: 6.75 THOUSAND/UL (ref 4.31–10.16)

## 2021-05-14 PROCEDURE — 36415 COLL VENOUS BLD VENIPUNCTURE: CPT

## 2021-05-14 PROCEDURE — 85025 COMPLETE CBC W/AUTO DIFF WBC: CPT

## 2021-05-14 PROCEDURE — 80053 COMPREHEN METABOLIC PANEL: CPT

## 2021-05-14 PROCEDURE — 80061 LIPID PANEL: CPT

## 2021-05-14 PROCEDURE — 84443 ASSAY THYROID STIM HORMONE: CPT

## 2021-05-20 ENCOUNTER — OFFICE VISIT (OUTPATIENT)
Dept: INTERNAL MEDICINE CLINIC | Facility: CLINIC | Age: 51
End: 2021-05-20
Payer: COMMERCIAL

## 2021-05-20 VITALS
HEIGHT: 66 IN | BODY MASS INDEX: 20.44 KG/M2 | DIASTOLIC BLOOD PRESSURE: 78 MMHG | WEIGHT: 127.2 LBS | SYSTOLIC BLOOD PRESSURE: 118 MMHG | HEART RATE: 61 BPM | TEMPERATURE: 97.5 F | OXYGEN SATURATION: 98 %

## 2021-05-20 DIAGNOSIS — F33.41 RECURRENT MAJOR DEPRESSIVE DISORDER, IN PARTIAL REMISSION (HCC): ICD-10-CM

## 2021-05-20 DIAGNOSIS — K21.9 GASTROESOPHAGEAL REFLUX DISEASE WITHOUT ESOPHAGITIS: ICD-10-CM

## 2021-05-20 DIAGNOSIS — I10 ESSENTIAL HYPERTENSION: Primary | ICD-10-CM

## 2021-05-20 DIAGNOSIS — F41.9 ANXIETY: ICD-10-CM

## 2021-05-20 DIAGNOSIS — E78.2 MIXED HYPERLIPIDEMIA: ICD-10-CM

## 2021-05-20 PROBLEM — N64.4 BREAST PAIN: Status: RESOLVED | Noted: 2019-03-20 | Resolved: 2021-05-20

## 2021-05-20 PROCEDURE — 3008F BODY MASS INDEX DOCD: CPT | Performed by: INTERNAL MEDICINE

## 2021-05-20 PROCEDURE — 99214 OFFICE O/P EST MOD 30 MIN: CPT | Performed by: INTERNAL MEDICINE

## 2021-05-20 PROCEDURE — 3078F DIAST BP <80 MM HG: CPT | Performed by: INTERNAL MEDICINE

## 2021-05-20 PROCEDURE — 3074F SYST BP LT 130 MM HG: CPT | Performed by: INTERNAL MEDICINE

## 2021-05-20 PROCEDURE — 1036F TOBACCO NON-USER: CPT | Performed by: INTERNAL MEDICINE

## 2021-05-20 NOTE — PROGRESS NOTES
INTERNAL MEDICINE OFFICE VISIT  [facility]  [facility]  Tel: [phone]      NAME: Zaid Arenas  AGE: 45 y o  SEX: female  : 1970   MRN: [id number]    DATE: 2021  TIME: 9:52 AM      Assessment and Plan:  1  Essential hypertension   continue present medications    2  Mixed hyperlipidemia   continue a low-fat diet  - CBC and differential; Future  - Comprehensive metabolic panel; Future  - Lipid panel; Future  - TSH, 3rd generation; Future    3  Gastroesophageal reflux disease without esophagitis   continue pantoprazole    4  Recurrent major depressive disorder, in partial remission ([facility] [address] )   continue Celexa    5  Anxiety   continue Klonopin      - Counseling Documentation: patient was counseled regarding: diagnostic results, instructions for management, risk factor reductions, prognosis, patient and family education, risks and benefits of treatment options and importance of compliance with treatment  - Medication Side Effects: Adverse side effects of medications were reviewed with the patient/guardian today  Return for follow up visit in  6 months or earlier, if needed  Chief Complaint:  Chief Complaint   Patient presents with    Follow-up     6 months/ labs done         History of Present Illness:    hypertension -well controlled   Hyperlipidemia - she is trying to work on her diet and her LDL is gradually decreasing and it was 147 today    She does not want to take medication and wants to work on the diet some more  GERD -stable on Protonix   Depression and anxiety -has been taking medication and doing meditation and yoga which is helping her      Active Problem List:  Patient Active Problem List   Diagnosis    PTSD (post-traumatic stress disorder)    MDD (major depressive disorder)    HSV (herpes simplex virus) infection    Family history of breast cancer    Bradycardia    Essential hypertension    Anxiety  Gastroesophageal reflux disease without esophagitis    Genetic testing    Family history of ovarian cancer    Neck pain    Mixed hyperlipidemia         Past Medical History:  Past Medical History:   Diagnosis Date    Abnormal Pap smear of cervix     Anxiety     Bradycardia     MDD (major depressive disorder)     PTSD (post-traumatic stress disorder)          Past Surgical History:  Past Surgical History:   Procedure Laterality Date    BREAST CYST EXCISION Left     benign    CERVICAL BIOPSY  W/ LOOP ELECTRODE EXCISION       SECTION  2013    OOPHORECTOMY Bilateral 2015    PARTIAL HYSTERECTOMY  2014    supracervical hysterectomy         Family History:  Family History   Problem Relation Age of Onset    Diabetes Mother     Lung cancer Father         non smoker    Colon polyps Father     Pancreatic cancer Maternal Grandmother     Heart disease Paternal Grandmother     Hypertension Paternal Grandmother     Breast cancer Maternal Aunt 39    Uterine cancer Maternal Aunt     Breast cancer Maternal Aunt 46    Stroke Sister     No Known Problems Brother     Other Sister         Hydrocephalus    Colon cancer Neg Hx     Ovarian cancer Neg Hx     Cervical cancer Neg Hx          Social History:  Social History     Socioeconomic History    Marital status: /Civil Union     Spouse name: None    Number of children: None    Years of education: None    Highest education level: None   Occupational History    None   Social Needs    Financial resource strain: None    Food insecurity     Worry: None     Inability: None    Transportation needs     Medical: None     Non-medical: None   Tobacco Use    Smoking status: Never Smoker    Smokeless tobacco: Never Used   Substance and Sexual Activity    Alcohol use: Not Currently     Frequency: Monthly or less    Drug use: Yes     Types: Marijuana     Comment: Medical Marijuana Card    Sexual activity: Yes     Partners: Male     Birth control/protection: Surgical   Lifestyle    Physical activity     Days per week: 0 days     Minutes per session: 0 min    Stress: Not at all   Relationships    Social connections     Talks on phone: None     Gets together: None     Attends Restorationism service: None     Active member of club or organization: None     Attends meetings of clubs or organizations: None     Relationship status: None    Intimate partner violence     Fear of current or ex partner: None     Emotionally abused: None     Physically abused: None     Forced sexual activity: None   Other Topics Concern    None   Social History Narrative    None         Allergies:   Allergies   Allergen Reactions    Lisinopril Angioedema         Medications:    Current Outpatient Medications:     acyclovir (ZOVIRAX) 400 MG tablet, Take 1 tablet (400 mg total) by mouth 2 (two) times a day, Disp: 90 tablet, Rfl: 1    acyclovir (ZOVIRAX) 5 % ointment, Apply topically 4 (four) times a day (Patient taking differently: Apply topically as needed ), Disp: 30 g, Rfl: 0    amLODIPine (NORVASC) 5 mg tablet, Take 5 mg by mouth daily, Disp: , Rfl: 0    citalopram (CeleXA) 40 mg tablet, Take 40 mg by mouth daily, Disp: , Rfl: 0    clonazePAM (KlonoPIN) 1 mg tablet, Take 1 mg by mouth 3 (three) times a day, Disp: , Rfl: 0    estradiol (VAGIFEM, YUVAFEM) 10 MCG TABS vaginal tablet, Place 1 tablet in the vagina daily for 2 weeks, followed by twice weekly, Disp: 30 tablet, Rfl: 4    NON FORMULARY, Medical Marijuana, Disp: , Rfl:     pantoprazole (PROTONIX) 40 mg tablet, TAKE 1 TABLET BY MOUTH EVERY DAY, Disp: , Rfl:     propranolol (INDERAL) 20 mg tablet, , Disp: , Rfl:     traZODone (DESYREL) 100 mg tablet, Take 50 mg by mouth daily at bedtime as needed  , Disp: , Rfl:       The following portions of the patient's history were reviewed and updated as appropriate: past medical history, past surgical history, family history, social history, allergies, current medications and active problem list       Review of Systems:  Constitutional: Denies fever, chills, weight gain, weight loss, fatigue  Eyes: Denies eye redness, eye discharge, double vision, change in visual acuity  ENT: Denies hearing loss, tinnitus, sneezing, nasal congestion, nasal discharge, sore throat   Respiratory: Denies cough, expectoration, hemoptysis, shortness of breath, wheezing  Cardiovascular: Denies chest pain, palpitations, lower extremity swelling, orthopnea, PND  Gastrointestinal: Denies abdominal pain, heartburn, nausea, vomiting, hematemesis, diarrhea, bloody stools  Genito-Urinary: Denies dysuria, frequency, difficulty in micturition, nocturia, incontinence  Musculoskeletal: Denies back pain, joint pain, muscle pain  Neurologic: Denies confusion, lightheadedness, syncope, headache, focal weakness, sensory changes, seizures  Endocrine: Denies polyuria, polydipsia, temperature intolerance  Allergy and Immunology: Denies hives, insect bite sensitivity  Hematological and Lymphatic: Denies bleeding problems, swollen glands   Psychological:  has depression, anxiety, panic, mood swings  Dermatological: Denies pruritus, rash, skin lesion changes      Vitals:  Vitals:    05/20/21 0938   BP: 118/78   Pulse: 61   Temp: 97 5 °F (36 4 °C)   SpO2: 98%       Body mass index is 20 53 kg/m²  Weight (last 2 days)     Date/Time   Weight    05/20/21 0938   57 7 (127 2)                Physical Examination:  General: Patient is not in acute distress  Awake, alert, responding to commands  No weight gain or loss  Head: Normocephalic  Atraumatic  Eyes: Conjunctiva and lids with no swelling, erythema or discharge  Both pupils normal sized, round and reactive to light  Sclera nonicteric  ENT: External examination of nose and ear normal  Otoscopic examination shows translucent tympanic membranes with patent canals without erythema  Oropharynx moist with no erythema, edema, exudate or lesions  Neck: Supple   JVP not raised  Trachea midline  No masses  No thyromegaly  Lungs: No signs of increased work of breathing or respiratory distress  Bilateral bronchovascular breath sounds with no crackles or rhonchi  Chest wall: No tenderness  Cardiovascular: Normal PMI  No thrills  Regular rate and rhythm  S1 and S2 normal  No murmur, rub or gallop  Gastrointestinal: Abdomen soft, nontender  No guarding or rigidity  Liver and spleen not palpable  Bowel sounds present  Neurologic: Cranial nerves II-XII intact   Cortical functions normal  Motor system - Reflexes 2+ and symmetrical  Sensations normal  Musculoskeletal: Gait normal  No joint tenderness  Integumentary: Skin normal with no rash or lesions  Lymphatic: No palpable lymph nodes in neck, axilla or groin  Extremities: No clubbing, cyanosis, edema or varicosities  Psychological: Judgement and insight normal   anxious      Laboratory Results:  CBC with diff:   Lab Results   Component Value Date    WBC 6 75 05/14/2021    RBC 4 56 05/14/2021    HGB 13 5 05/14/2021    HCT 41 3 05/14/2021    MCV 91 05/14/2021    MCH 29 6 05/14/2021    RDW 13 7 05/14/2021     05/14/2021       CMP:  Lab Results   Component Value Date    CREATININE 0 85 05/14/2021    BUN 10 05/14/2021    K 4 1 05/14/2021     05/14/2021    CO2 29 05/14/2021    ALKPHOS 68 05/14/2021    ALT 18 05/14/2021    AST 7 05/14/2021       No results found for: HGBA1C, MG, PHOS    No results found for: TROPONINI, CKMB, CKTOTAL    Lipid Profile:   No results found for: CHOL  Lab Results   Component Value Date    HDL 63 05/14/2021    HDL 62 12/28/2020     Lab Results   Component Value Date    LDLCALC 147 (H) 05/14/2021    LDLCALC 168 (H) 12/28/2020     Lab Results   Component Value Date    TRIG 79 05/14/2021    TRIG 53 12/28/2020       Imaging Results:  Colonoscopy  Narrative:  [address] [facility] [address]  [phone]    DATE OF SERVICE:  1/30/21    PHYSICIAN(S):  Bebe Biggs Royal No MD - Attending Physician    INDICATION:  Screen for colon cancer  Colonoscopy performed for a screening indication  POST-OP DIAGNOSIS:  See the impression below  HISTORY:  Prior colonoscopy: No prior colonoscopy  PREPROCEDURE:  Informed consent was obtained for the procedure, including sedation  Risks   including but not limited to bleeding, infection, perforation, adverse   drug reaction and aspiration were explained in detail  Also explained   about less than 100% sensitivity with the exam and other alternatives  The   patient was placed in the left lateral decubitus position  DETAILS OF PROCEDURE:  Patient was taken to the procedure room where a time out was performed to   confirm correct patient and correct procedure  The patient underwent   monitored anesthesia care, which was administered by an anesthesia   professional  The patient's blood pressure, heart rate, level of   consciousness, oxygen and respirations were monitored throughout the   procedure  A digital rectal exam was performed  The scope was introduced   through the anus and advanced to the cecum  Photodocumentation was   obtained at the ileocecal valve, appendiceal orifice and retroflexed view   of the rectum  Retroflexion was performed in the rectum  The quality of   bowel preparation was evaluated using the [facility] Bowel Preparation Scale   with scores of: right colon = 3, transverse colon = 2, left colon = 1  The   total BBPS score was 6  Bowel prep was not adequate  The patient's   estimated blood loss was minimal (<5 mL)  The procedure was not difficult  The patient tolerated the procedure well  There were no apparent   complications       ANESTHESIA INFORMATION:  ASA: II  Anesthesia Type: IV Sedation with Anesthesia    FINDINGS:  One 2 mm sessile polyp in the mid rectum; performed complete en bloc   removal by cold forceps biopsy  One 1 mm sessile polyp in the rectosigmoid; performed complete en bloc   removal by cold forceps biopsy  Two sessile polyps measuring smaller than 5 mm in the distal sigmoid   colon; completely removed en bloc by cold snare and retrieved specimen  Poor left colon preparation  All observed locations appeared normal, including the cecum, ascending   colon, hepatic flexure and transverse colon  EVENTS:  Procedure Events   Event Event Time   ENDO CECUM REACHED 1/30/2021  9:19 AM   ENDO SCOPE OUT TIME 1/30/2021  9:25 AM     SPECIMENS:  ID Type Source Tests Collected by Time Destination   1 : rectum Tissue Polyp, Colorectal TISSUE EXAM Kyler Garcia MD   1/30/2021  9:18 AM    2 : recto sigmoid Tissue Polyp, Colorectal TISSUE EXAM Kyler Garcia MD 1/30/2021  9:18 AM    3 : sigmoid  x2 Tissue Polyp, Colorectal TISSUE EXAM Kyler Garcia MD   1/30/2021  9:22 AM         Impression: 1  Rectal polyp status post excisional biopsy removal  2  Rectosigmoid polyp status post excisional biopsy removal   3  2 sigmoid colon polyps status post cold snare polypectomies  4   Poor left colon preparation    RECOMMENDATION:  Repeat in 1 year due to an inadequate bowel preparation and history of   colon polyps  Follow-up biopsy results a week from next Tuesday    Kyler Garcia MD       Health Maintenance:  Health Maintenance   Topic Date Due    COVID-19 Vaccine (1) Never done    MAMMOGRAM  03/21/2020    Influenza Vaccine (Season Ended) 09/01/2021    Depression Remission PHQ  11/16/2021    Annual Physical  11/17/2021    Colonoscopy Surveillance  01/30/2022    BMI: Adult  04/05/2022    Cervical Cancer Screening  11/20/2023    DTaP,Tdap,and Td Vaccines (2 - Td) 11/29/2027    Colorectal Cancer Screening  01/30/2031    HIV Screening  Completed    Hepatitis C Screening  Completed    Pneumococcal Vaccine: Pediatrics (0 to 5 Years) and At-Risk Patients (6 to 59 Years)  Aged Out    HIB Vaccine  Aged Out    Hepatitis B Vaccine  Aged Out    IPV Vaccine  Aged Out    Hepatitis A Vaccine  Aged Aric  Meningococcal ACWY Vaccine  Aged Out    HPV Vaccine  Aged Out     Immunization History   Administered Date(s) Administered    Fluzone Split Quad 0 5 mL 11/29/2017    INFLUENZA 01/28/2017, 11/29/2017    Tdap 11/29/2017         Nidia Helms MD  5/20/2021,9:52 AM

## 2021-07-23 ENCOUNTER — TELEPHONE (OUTPATIENT)
Dept: NEUROLOGY | Facility: CLINIC | Age: 51
End: 2021-07-23

## 2021-07-26 DIAGNOSIS — B00.9 HSV (HERPES SIMPLEX VIRUS) INFECTION: ICD-10-CM

## 2021-07-27 RX ORDER — ACYCLOVIR 400 MG/1
400 TABLET ORAL 2 TIMES DAILY
Qty: 90 TABLET | Refills: 1 | Status: SHIPPED | OUTPATIENT
Start: 2021-07-27 | End: 2021-10-13 | Stop reason: SDUPTHER

## 2021-08-16 ENCOUNTER — OFFICE VISIT (OUTPATIENT)
Dept: INTERNAL MEDICINE CLINIC | Facility: CLINIC | Age: 51
End: 2021-08-16
Payer: COMMERCIAL

## 2021-08-16 VITALS
HEART RATE: 60 BPM | SYSTOLIC BLOOD PRESSURE: 122 MMHG | BODY MASS INDEX: 20.31 KG/M2 | RESPIRATION RATE: 10 BRPM | OXYGEN SATURATION: 99 % | HEIGHT: 66 IN | DIASTOLIC BLOOD PRESSURE: 72 MMHG | TEMPERATURE: 95.9 F | WEIGHT: 126.4 LBS

## 2021-08-16 DIAGNOSIS — F43.10 PTSD (POST-TRAUMATIC STRESS DISORDER): ICD-10-CM

## 2021-08-16 DIAGNOSIS — E78.2 MIXED HYPERLIPIDEMIA: ICD-10-CM

## 2021-08-16 DIAGNOSIS — I10 ESSENTIAL HYPERTENSION: Primary | ICD-10-CM

## 2021-08-16 DIAGNOSIS — F41.9 ANXIETY: ICD-10-CM

## 2021-08-16 PROBLEM — F32.9 MDD (MAJOR DEPRESSIVE DISORDER): Status: RESOLVED | Noted: 2018-03-30 | Resolved: 2021-08-16

## 2021-08-16 PROBLEM — K21.9 GASTROESOPHAGEAL REFLUX DISEASE WITHOUT ESOPHAGITIS: Status: RESOLVED | Noted: 2017-11-29 | Resolved: 2021-08-16

## 2021-08-16 PROCEDURE — 99214 OFFICE O/P EST MOD 30 MIN: CPT | Performed by: INTERNAL MEDICINE

## 2021-08-16 PROCEDURE — 3008F BODY MASS INDEX DOCD: CPT | Performed by: INTERNAL MEDICINE

## 2021-08-16 PROCEDURE — 1036F TOBACCO NON-USER: CPT | Performed by: INTERNAL MEDICINE

## 2021-08-16 NOTE — PROGRESS NOTES
INTERNAL MEDICINE OFFICE VISIT  [facility] AT [address]  Tel: [phone]      NAME: Saan Goddard  AGE: 45 y o  SEX: female  : 1970   MRN: [id number]    DATE: 2021  TIME: 9:07 AM      Assessment and Plan:  1  Essential hypertension   continue present medication  She was told to continue taking the amlodipine for now as her blood pressure is well controlled on at    2  Mixed hyperlipidemia   continue low-fat diet    3  PTSD (post-traumatic stress disorder)   continue Klonopin    4  Anxiety   continue Klonopin      - Counseling Documentation: patient was counseled regarding: diagnostic results, instructions for management, risk factor reductions, prognosis, patient and family education, risks and benefits of treatment options and importance of compliance with treatment  - Medication Side Effects: Adverse side effects of medications were reviewed with the patient/guardian today  Return for follow up visit in  As needed or earlier, if needed  Chief Complaint:  Chief Complaint   Patient presents with    Follow-up         History of Present Illness:    patient is moving to [place] with her daughter who is going to college there  Her blood pressure is very well controlled on the amlodipine 5 mg daily but she wants to wean herself off it  I told her to continue taking it  She is not taking anything for the cholesterol  She continues to take the Klonopin as needed once a day and follows up with Psychiatry        Active Problem List:  Patient Active Problem List   Diagnosis    PTSD (post-traumatic stress disorder)    HSV (herpes simplex virus) infection    Family history of breast cancer    Bradycardia    Essential hypertension    Anxiety    Genetic testing    Family history of ovarian cancer    Neck pain    Mixed hyperlipidemia         Past Medical History:  Past Medical History:   Diagnosis Date    Abnormal Pap smear of cervix     Anxiety     Bradycardia     MDD (major depressive disorder)     PTSD (post-traumatic stress disorder)          Past Surgical History:  Past Surgical History:   Procedure Laterality Date    BREAST CYST EXCISION Left 1984    benign    CERVICAL BIOPSY  W/ LOOP ELECTRODE EXCISION       SECTION  2013    OOPHORECTOMY Bilateral 2015    PARTIAL HYSTERECTOMY  2014    supracervical hysterectomy         Family History:  Family History   Problem Relation Age of Onset    Diabetes Mother     Lung cancer Father         non smoker    Colon polyps Father     Pancreatic cancer Maternal Grandmother     Heart disease Paternal Grandmother     Hypertension Paternal Grandmother     Breast cancer Maternal Aunt 39    Uterine cancer Maternal Aunt     Breast cancer Maternal Aunt 46    Stroke Sister     No Known Problems Brother     Other Sister         Hydrocephalus    Colon cancer Neg Hx     Ovarian cancer Neg Hx     Cervical cancer Neg Hx          Social History:  Social History     Socioeconomic History    Marital status: /Civil Union     Spouse name: None    Number of children: None    Years of education: None    Highest education level: None   Occupational History    None   Tobacco Use    Smoking status: Never Smoker    Smokeless tobacco: Never Used   Vaping Use    Vaping Use: Never used   Substance and Sexual Activity    Alcohol use: Not Currently    Drug use: Yes     Types: Marijuana     Comment: Medical Marijuana Card    Sexual activity: Yes     Partners: Male     Birth control/protection: Surgical   Other Topics Concern    None   Social History Narrative    None     Social Determinants of Health     Financial Resource Strain:     Difficulty of Paying Living Expenses:    Food Insecurity:     Worried About Running Out of Food in the Last Year:     Ran Out of Food in the Last Year:    Transportation Needs:     Lack of Transportation (Medical):    Janell Regalado Lack of Transportation (Non-Medical):    Physical Activity: Inactive    Days of Exercise per Week: 0 days    Minutes of Exercise per Session: 0 min   Stress: No Stress Concern Present    Feeling of Stress : Not at all   Social Connections:     Frequency of Communication with Friends and Family:     Frequency of Social Gatherings with Friends and Family:     Attends Orthodox Services:     Active Member of Clubs or Organizations:     Attends Club or Organization Meetings:     Marital Status:    Intimate Partner Violence:     Fear of Current or Ex-Partner:     Emotionally Abused:     Physically Abused:     Sexually Abused: Allergies:   Allergies   Allergen Reactions    Lisinopril Angioedema         Medications:    Current Outpatient Medications:     acyclovir (ZOVIRAX) 400 MG tablet, Take 1 tablet (400 mg total) by mouth 2 (two) times a day, Disp: 90 tablet, Rfl: 1    acyclovir (ZOVIRAX) 5 % ointment, Apply topically 4 (four) times a day (Patient taking differently: Apply topically as needed ), Disp: 30 g, Rfl: 0    amLODIPine (NORVASC) 5 mg tablet, Take 5 mg by mouth daily, Disp: , Rfl: 0    clonazePAM (KlonoPIN) 1 mg tablet, Take 1 mg by mouth 3 (three) times a day, Disp: , Rfl: 0    estradiol (VAGIFEM, YUVAFEM) 10 MCG TABS vaginal tablet, Place 1 tablet in the vagina daily for 2 weeks, followed by twice weekly, Disp: 30 tablet, Rfl: 4    NON FORMULARY, Medical Marijuana, Disp: , Rfl:     traZODone (DESYREL) 100 mg tablet, Take 50 mg by mouth daily at bedtime as needed  , Disp: , Rfl:       The following portions of the patient's history were reviewed and updated as appropriate: past medical history, past surgical history, family history, social history, allergies, current medications and active problem list       Review of Systems:  Constitutional: Denies fever, chills, weight gain, weight loss, fatigue  Eyes: Denies eye redness, eye discharge, double vision, change in visual acuity  ENT: Denies hearing loss, tinnitus, sneezing, nasal congestion, nasal discharge, sore throat   Respiratory: Denies cough, expectoration, hemoptysis, shortness of breath, wheezing  Cardiovascular: Denies chest pain, palpitations, lower extremity swelling, orthopnea, PND  Gastrointestinal: Denies abdominal pain, heartburn, nausea, vomiting, hematemesis, diarrhea, bloody stools  Genito-Urinary: Denies dysuria, frequency, difficulty in micturition, nocturia, incontinence  Musculoskeletal: Denies back pain, joint pain, muscle pain  Neurologic: Denies confusion, lightheadedness, syncope, headache, focal weakness, sensory changes, seizures  Endocrine: Denies polyuria, polydipsia, temperature intolerance  Allergy and Immunology: Denies hives, insect bite sensitivity  Hematological and Lymphatic: Denies bleeding problems, swollen glands   Psychological: Denies depression, suicidal ideation, anxiety, panic, mood swings  Dermatological: Denies pruritus, rash, skin lesion changes      Vitals:  Vitals:    08/16/21 0829   BP: 122/72   Pulse: 60   Resp: (!) 10   Temp: (!) 95 9 °F (35 5 °C)   SpO2: 99%       Body mass index is 20 4 kg/m²  Weight (last 2 days)     Date/Time   Weight    08/16/21 0829   57 3 (126 4)                Physical Examination:  General: Patient is not in acute distress  Awake, alert, responding to commands  No weight gain or loss  Head: Normocephalic  Atraumatic  Eyes: Conjunctiva and lids with no swelling, erythema or discharge  Both pupils normal sized, round and reactive to light  Sclera nonicteric  ENT: External examination of nose and ear normal  Otoscopic examination shows translucent tympanic membranes with patent canals without erythema  Oropharynx moist with no erythema, edema, exudate or lesions  Neck: Supple  JVP not raised  Trachea midline  No masses  No thyromegaly  Lungs: No signs of increased work of breathing or respiratory distress   Bilateral bronchovascular breath sounds with no crackles or rhonchi  Chest wall: No tenderness  Cardiovascular: Normal PMI  No thrills  Regular rate and rhythm  S1 and S2 normal  No murmur, rub or gallop  Gastrointestinal: Abdomen soft, nontender  No guarding or rigidity  Liver and spleen not palpable  Bowel sounds present  Neurologic: Cranial nerves II-XII intact  Cortical functions normal  Motor system - Reflexes 2+ and symmetrical  Sensations normal  Musculoskeletal: Gait normal  No joint tenderness  Integumentary: Skin normal with no rash or lesions  Lymphatic: No palpable lymph nodes in neck, axilla or groin  Extremities: No clubbing, cyanosis, edema or varicosities  Psychological: Judgement and insight normal  Mood and affect normal      Laboratory Results:  CBC with diff:   Lab Results   Component Value Date    WBC 6 75 05/14/2021    RBC 4 56 05/14/2021    HGB 13 5 05/14/2021    HCT 41 3 05/14/2021    MCV 91 05/14/2021    MCH 29 6 05/14/2021    RDW 13 7 05/14/2021     05/14/2021       CMP:  Lab Results   Component Value Date    CREATININE 0 85 05/14/2021    BUN 10 05/14/2021    K 4 1 05/14/2021     05/14/2021    CO2 29 05/14/2021    ALKPHOS 68 05/14/2021    ALT 18 05/14/2021    AST 7 05/14/2021       No results found for: HGBA1C, MG, PHOS    No results found for: TROPONINI, CKMB, CKTOTAL    Lipid Profile:   No results found for: CHOL  Lab Results   Component Value Date    HDL 63 05/14/2021    HDL 62 12/28/2020     Lab Results   Component Value Date    LDLCALC 147 (H) 05/14/2021    LDLCALC 168 (H) 12/28/2020     Lab Results   Component Value Date    TRIG 79 05/14/2021    TRIG 53 12/28/2020       Imaging Results:  Colonoscopy  Narrative:  [id number]  [address] [place] [address]  [phone]    DATE OF SERVICE:  1/30/21    PHYSICIAN(S):  Ally Huffman MD - Attending Physician    INDICATION:  Screen for colon cancer  Colonoscopy performed for a screening indication      POST-OP DIAGNOSIS:  See the impression below  HISTORY:  Prior colonoscopy: No prior colonoscopy  PREPROCEDURE:  Informed consent was obtained for the procedure, including sedation  Risks   including but not limited to bleeding, infection, perforation, adverse   drug reaction and aspiration were explained in detail  Also explained   about less than 100% sensitivity with the exam and other alternatives  The   patient was placed in the left lateral decubitus position  DETAILS OF PROCEDURE:  Patient was taken to the procedure room where a time out was performed to   confirm correct patient and correct procedure  The patient underwent   monitored anesthesia care, which was administered by an anesthesia   professional  The patient's blood pressure, heart rate, level of   consciousness, oxygen and respirations were monitored throughout the   procedure  A digital rectal exam was performed  The scope was introduced   through the anus and advanced to the cecum  Photodocumentation was   obtained at the ileocecal valve, appendiceal orifice and retroflexed view   of the rectum  Retroflexion was performed in the rectum  The quality of   bowel preparation was evaluated using the [facility] Bowel Preparation Scale   with scores of: right colon = 3, transverse colon = 2, left colon = 1  The   total BBPS score was 6  Bowel prep was not adequate  The patient's   estimated blood loss was minimal (<5 mL)  The procedure was not difficult  The patient tolerated the procedure well  There were no apparent   complications  ANESTHESIA INFORMATION:  ASA: II  Anesthesia Type: IV Sedation with Anesthesia    FINDINGS:  One 2 mm sessile polyp in the mid rectum; performed complete en bloc   removal by cold forceps biopsy  One 1 mm sessile polyp in the rectosigmoid; performed complete en bloc   removal by cold forceps biopsy  Two sessile polyps measuring smaller than 5 mm in the distal sigmoid   colon; completely removed en bloc by cold snare and retrieved specimen  Poor left colon preparation  All observed locations appeared normal, including the cecum, ascending   colon, hepatic flexure and transverse colon  EVENTS:  Procedure Events   Event Event Time   ENDO CECUM REACHED 1/30/2021  9:19 AM   ENDO SCOPE OUT TIME 1/30/2021  9:25 AM     SPECIMENS:  ID Type Source Tests Collected by Time Destination   1 : rectum Tissue Polyp, Colorectal TISSUE EXAM Josemanuel Oquendo MD   1/30/2021  9:18 AM    2 : recto sigmoid Tissue Polyp, Colorectal TISSUE EXAM Josemanuel Oquendo MD 1/30/2021  9:18 AM    3 : sigmoid  x2 Tissue Polyp, Colorectal TISSUE EXAM Josemanuel Oquendo MD   1/30/2021  9:22 AM         Impression: 1  Rectal polyp status post excisional biopsy removal  2  Rectosigmoid polyp status post excisional biopsy removal   3  2 sigmoid colon polyps status post cold snare polypectomies  4   Poor left colon preparation    RECOMMENDATION:  Repeat in 1 year due to an inadequate bowel preparation and history of   colon polyps  Follow-up biopsy results a week from next Tuesday    Josemanuel Oquendo MD       Health Maintenance:  Health Maintenance   Topic Date Due    COVID-19 Vaccine (1) Never done    Breast Cancer Screening: Mammogram  03/21/2020    Influenza Vaccine (1) 09/01/2021    Depression Remission PHQ  11/16/2021    Annual Physical  11/17/2021    Colorectal Cancer Screening  01/30/2022    BMI: Adult  05/20/2022    Cervical Cancer Screening  11/20/2023    DTaP,Tdap,and Td Vaccines (2 - Td or Tdap) 11/29/2027    HIV Screening  Completed    Hepatitis C Screening  Completed    Pneumococcal Vaccine: Pediatrics (0 to 5 Years) and At-Risk Patients (6 to 59 Years)  Aged Out    HIB Vaccine  Aged Out    Hepatitis B Vaccine  Aged Out    IPV Vaccine  Aged Out    Hepatitis A Vaccine  Aged Out    Meningococcal ACWY Vaccine  Aged Out    HPV Vaccine  Aged [employer] History   Administered Date(s) Administered    Fluzone Split Quad 0 5 mL 11/29/2017    INFLUENZA 01/28/2017, 11/29/2017    Tdap 11/29/2017         Ko Allen MD  8/16/2021,9:07 AM

## 2021-08-25 ENCOUNTER — VBI (OUTPATIENT)
Dept: ADMINISTRATIVE | Facility: OTHER | Age: 51
End: 2021-08-25

## 2021-09-30 ENCOUNTER — HOSPITAL ENCOUNTER (EMERGENCY)
Facility: HOSPITAL | Age: 51
Discharge: HOME | End: 2021-09-30
Attending: EMERGENCY MEDICINE | Admitting: EMERGENCY MEDICINE
Payer: COMMERCIAL

## 2021-09-30 ENCOUNTER — APPOINTMENT (EMERGENCY)
Dept: RADIOLOGY | Facility: HOSPITAL | Age: 51
End: 2021-09-30
Payer: COMMERCIAL

## 2021-09-30 VITALS
BODY MASS INDEX: 20.41 KG/M2 | SYSTOLIC BLOOD PRESSURE: 124 MMHG | HEIGHT: 66 IN | HEART RATE: 54 BPM | OXYGEN SATURATION: 100 % | TEMPERATURE: 97 F | WEIGHT: 127 LBS | DIASTOLIC BLOOD PRESSURE: 76 MMHG

## 2021-09-30 DIAGNOSIS — M54.50 ACUTE LOW BACK PAIN: Primary | ICD-10-CM

## 2021-09-30 PROCEDURE — 72100 X-RAY EXAM L-S SPINE 2/3 VWS: CPT

## 2021-09-30 PROCEDURE — 99283 EMERGENCY DEPT VISIT LOW MDM: CPT

## 2021-09-30 PROCEDURE — 96372 THER/PROPH/DIAG INJ SC/IM: CPT

## 2021-09-30 PROCEDURE — 99284 EMERGENCY DEPT VISIT MOD MDM: CPT | Performed by: PHYSICIAN ASSISTANT

## 2021-09-30 RX ORDER — METHOCARBAMOL 500 MG/1
500 TABLET, FILM COATED ORAL 2 TIMES DAILY
Qty: 20 TABLET | Refills: 0 | Status: SHIPPED | OUTPATIENT
Start: 2021-09-30 | End: 2021-10-13 | Stop reason: CLARIF

## 2021-09-30 RX ORDER — NAPROXEN 500 MG/1
500 TABLET ORAL 2 TIMES DAILY WITH MEALS
Qty: 30 TABLET | Refills: 0 | Status: SHIPPED | OUTPATIENT
Start: 2021-09-30 | End: 2021-10-13 | Stop reason: CLARIF

## 2021-09-30 RX ORDER — KETOROLAC TROMETHAMINE 30 MG/ML
30 INJECTION, SOLUTION INTRAMUSCULAR; INTRAVENOUS ONCE
Status: COMPLETED | OUTPATIENT
Start: 2021-09-30 | End: 2021-09-30

## 2021-09-30 RX ADMIN — KETOROLAC TROMETHAMINE 30 MG: 30 INJECTION, SOLUTION INTRAMUSCULAR at 12:12

## 2021-10-13 ENCOUNTER — OFFICE VISIT (OUTPATIENT)
Dept: INTERNAL MEDICINE CLINIC | Facility: CLINIC | Age: 51
End: 2021-10-13
Payer: COMMERCIAL

## 2021-10-13 VITALS
BODY MASS INDEX: 20.99 KG/M2 | WEIGHT: 130.6 LBS | HEIGHT: 66 IN | DIASTOLIC BLOOD PRESSURE: 78 MMHG | TEMPERATURE: 97.5 F | OXYGEN SATURATION: 99 % | SYSTOLIC BLOOD PRESSURE: 126 MMHG | HEART RATE: 59 BPM

## 2021-10-13 DIAGNOSIS — B00.9 HSV (HERPES SIMPLEX VIRUS) INFECTION: ICD-10-CM

## 2021-10-13 DIAGNOSIS — M54.50 ACUTE MIDLINE LOW BACK PAIN WITHOUT SCIATICA: Primary | ICD-10-CM

## 2021-10-13 DIAGNOSIS — F33.9 DEPRESSION, RECURRENT (HCC): ICD-10-CM

## 2021-10-13 PROCEDURE — 3008F BODY MASS INDEX DOCD: CPT | Performed by: INTERNAL MEDICINE

## 2021-10-13 PROCEDURE — 1036F TOBACCO NON-USER: CPT | Performed by: INTERNAL MEDICINE

## 2021-10-13 PROCEDURE — 3078F DIAST BP <80 MM HG: CPT | Performed by: INTERNAL MEDICINE

## 2021-10-13 PROCEDURE — 3074F SYST BP LT 130 MM HG: CPT | Performed by: INTERNAL MEDICINE

## 2021-10-13 PROCEDURE — 99213 OFFICE O/P EST LOW 20 MIN: CPT | Performed by: INTERNAL MEDICINE

## 2021-10-13 RX ORDER — ACYCLOVIR 400 MG/1
400 TABLET ORAL 2 TIMES DAILY
Qty: 90 TABLET | Refills: 0 | Status: SHIPPED | OUTPATIENT
Start: 2021-10-13 | End: 2022-02-15

## 2021-10-13 RX ORDER — ACYCLOVIR 50 MG/G
OINTMENT TOPICAL 4 TIMES DAILY
Qty: 30 G | Refills: 0 | Status: SHIPPED | OUTPATIENT
Start: 2021-10-13

## 2022-02-03 ENCOUNTER — TELEPHONE (OUTPATIENT)
Dept: GASTROENTEROLOGY | Facility: CLINIC | Age: 52
End: 2022-02-03

## 2022-06-16 ENCOUNTER — APPOINTMENT (OUTPATIENT)
Dept: LAB | Facility: CLINIC | Age: 52
End: 2022-06-16
Payer: COMMERCIAL

## 2022-06-16 DIAGNOSIS — I10 ESSENTIAL HYPERTENSION: Primary | ICD-10-CM

## 2022-06-16 LAB
ALBUMIN SERPL BCP-MCNC: 3.9 G/DL (ref 3.5–5)
ALP SERPL-CCNC: 77 U/L (ref 46–116)
ALT SERPL W P-5'-P-CCNC: 21 U/L (ref 12–78)
ANION GAP SERPL CALCULATED.3IONS-SCNC: 1 MMOL/L (ref 4–13)
AST SERPL W P-5'-P-CCNC: 16 U/L (ref 5–45)
BASOPHILS # BLD AUTO: 0.05 THOUSANDS/ΜL (ref 0–0.1)
BASOPHILS NFR BLD AUTO: 1 % (ref 0–1)
BILIRUB SERPL-MCNC: 0.68 MG/DL (ref 0.2–1)
BUN SERPL-MCNC: 15 MG/DL (ref 5–25)
CALCIUM SERPL-MCNC: 9 MG/DL (ref 8.3–10.1)
CHLORIDE SERPL-SCNC: 107 MMOL/L (ref 100–108)
CHOLEST SERPL-MCNC: 280 MG/DL
CO2 SERPL-SCNC: 30 MMOL/L (ref 21–32)
CREAT SERPL-MCNC: 0.9 MG/DL (ref 0.6–1.3)
EOSINOPHIL # BLD AUTO: 0.21 THOUSAND/ΜL (ref 0–0.61)
EOSINOPHIL NFR BLD AUTO: 3 % (ref 0–6)
ERYTHROCYTE [DISTWIDTH] IN BLOOD BY AUTOMATED COUNT: 13.6 % (ref 11.6–15.1)
GFR SERPL CREATININE-BSD FRML MDRD: 73 ML/MIN/1.73SQ M
GLUCOSE SERPL-MCNC: 90 MG/DL (ref 65–140)
HCT VFR BLD AUTO: 42.2 % (ref 34.8–46.1)
HDLC SERPL-MCNC: 65 MG/DL
HGB BLD-MCNC: 13.7 G/DL (ref 11.5–15.4)
IMM GRANULOCYTES # BLD AUTO: 0.02 THOUSAND/UL (ref 0–0.2)
IMM GRANULOCYTES NFR BLD AUTO: 0 % (ref 0–2)
LDLC SERPL CALC-MCNC: 201 MG/DL (ref 0–100)
LYMPHOCYTES # BLD AUTO: 2.55 THOUSANDS/ΜL (ref 0.6–4.47)
LYMPHOCYTES NFR BLD AUTO: 36 % (ref 14–44)
MCH RBC QN AUTO: 29.5 PG (ref 26.8–34.3)
MCHC RBC AUTO-ENTMCNC: 32.5 G/DL (ref 31.4–37.4)
MCV RBC AUTO: 91 FL (ref 82–98)
MONOCYTES # BLD AUTO: 0.55 THOUSAND/ΜL (ref 0.17–1.22)
MONOCYTES NFR BLD AUTO: 8 % (ref 4–12)
NEUTROPHILS # BLD AUTO: 3.73 THOUSANDS/ΜL (ref 1.85–7.62)
NEUTS SEG NFR BLD AUTO: 52 % (ref 43–75)
NONHDLC SERPL-MCNC: 215 MG/DL
NRBC BLD AUTO-RTO: 0 /100 WBCS
PLATELET # BLD AUTO: 270 THOUSANDS/UL (ref 149–390)
PMV BLD AUTO: 9.9 FL (ref 8.9–12.7)
POTASSIUM SERPL-SCNC: 4.1 MMOL/L (ref 3.5–5.3)
PROT SERPL-MCNC: 7.7 G/DL (ref 6.4–8.2)
RBC # BLD AUTO: 4.64 MILLION/UL (ref 3.81–5.12)
SODIUM SERPL-SCNC: 138 MMOL/L (ref 136–145)
TRIGL SERPL-MCNC: 71 MG/DL
TSH SERPL DL<=0.05 MIU/L-ACNC: 1.22 UIU/ML (ref 0.45–4.5)
WBC # BLD AUTO: 7.11 THOUSAND/UL (ref 4.31–10.16)

## 2022-06-16 PROCEDURE — 85025 COMPLETE CBC W/AUTO DIFF WBC: CPT

## 2022-06-16 PROCEDURE — 84443 ASSAY THYROID STIM HORMONE: CPT

## 2022-06-16 PROCEDURE — 36415 COLL VENOUS BLD VENIPUNCTURE: CPT

## 2022-06-16 PROCEDURE — 80061 LIPID PANEL: CPT

## 2022-06-16 PROCEDURE — 80053 COMPREHEN METABOLIC PANEL: CPT

## 2022-06-17 ENCOUNTER — TELEPHONE (OUTPATIENT)
Dept: INTERNAL MEDICINE CLINIC | Facility: CLINIC | Age: 52
End: 2022-06-17

## 2022-06-17 DIAGNOSIS — E78.2 MIXED HYPERLIPIDEMIA: Primary | ICD-10-CM

## 2022-06-17 RX ORDER — ATORVASTATIN CALCIUM 20 MG/1
20 TABLET, FILM COATED ORAL DAILY
Qty: 90 TABLET | Refills: 3 | Status: SHIPPED | OUTPATIENT
Start: 2022-06-17

## 2022-06-17 NOTE — TELEPHONE ENCOUNTER
----- Message from Tobi Flores MD sent at 6/17/2022  2:25 PM EDT -----   Cholesterol is extremely high, please  the medication from the pharmacy and start taking it every night regularly  Also cut down on the fat intake  Repeat labs in 4 months

## 2022-07-18 ENCOUNTER — VBI (OUTPATIENT)
Dept: ADMINISTRATIVE | Facility: OTHER | Age: 52
End: 2022-07-18

## 2022-07-18 NOTE — TELEPHONE ENCOUNTER
07/18/22 9:56 AM     See documentation in the [employer] SmartForm       Yary Leary, [address] [facility]

## 2022-11-30 ENCOUNTER — APPOINTMENT (OUTPATIENT)
Dept: LAB | Facility: CLINIC | Age: 52
End: 2022-11-30

## 2022-11-30 ENCOUNTER — OFFICE VISIT (OUTPATIENT)
Dept: INTERNAL MEDICINE CLINIC | Facility: CLINIC | Age: 52
End: 2022-11-30

## 2022-11-30 VITALS
WEIGHT: 137.8 LBS | RESPIRATION RATE: 16 BRPM | HEIGHT: 67 IN | OXYGEN SATURATION: 99 % | HEART RATE: 50 BPM | SYSTOLIC BLOOD PRESSURE: 128 MMHG | TEMPERATURE: 98.3 F | DIASTOLIC BLOOD PRESSURE: 90 MMHG | BODY MASS INDEX: 21.63 KG/M2

## 2022-11-30 DIAGNOSIS — R07.89 CHEST TIGHTNESS: Primary | ICD-10-CM

## 2022-11-30 DIAGNOSIS — E78.2 MIXED HYPERLIPIDEMIA: Primary | ICD-10-CM

## 2022-11-30 DIAGNOSIS — Z12.12 SCREENING FOR COLORECTAL CANCER: ICD-10-CM

## 2022-11-30 DIAGNOSIS — R07.81 PLEURITIC PAIN: ICD-10-CM

## 2022-11-30 DIAGNOSIS — Z12.11 SCREENING FOR COLORECTAL CANCER: ICD-10-CM

## 2022-11-30 DIAGNOSIS — Z12.31 ENCOUNTER FOR SCREENING MAMMOGRAM FOR BREAST CANCER: ICD-10-CM

## 2022-11-30 DIAGNOSIS — B00.9 HSV (HERPES SIMPLEX VIRUS) INFECTION: ICD-10-CM

## 2022-11-30 LAB
BASOPHILS # BLD AUTO: 0.04 THOUSANDS/ÂΜL (ref 0–0.1)
BASOPHILS NFR BLD AUTO: 1 % (ref 0–1)
EOSINOPHIL # BLD AUTO: 0.17 THOUSAND/ÂΜL (ref 0–0.61)
EOSINOPHIL NFR BLD AUTO: 3 % (ref 0–6)
ERYTHROCYTE [DISTWIDTH] IN BLOOD BY AUTOMATED COUNT: 14 % (ref 11.6–15.1)
HCT VFR BLD AUTO: 40.4 % (ref 34.8–46.1)
HGB BLD-MCNC: 13 G/DL (ref 11.5–15.4)
IMM GRANULOCYTES # BLD AUTO: 0.01 THOUSAND/UL (ref 0–0.2)
IMM GRANULOCYTES NFR BLD AUTO: 0 % (ref 0–2)
LYMPHOCYTES # BLD AUTO: 2.05 THOUSANDS/ÂΜL (ref 0.6–4.47)
LYMPHOCYTES NFR BLD AUTO: 37 % (ref 14–44)
MCH RBC QN AUTO: 29.1 PG (ref 26.8–34.3)
MCHC RBC AUTO-ENTMCNC: 32.2 G/DL (ref 31.4–37.4)
MCV RBC AUTO: 91 FL (ref 82–98)
MONOCYTES # BLD AUTO: 0.49 THOUSAND/ÂΜL (ref 0.17–1.22)
MONOCYTES NFR BLD AUTO: 9 % (ref 4–12)
NEUTROPHILS # BLD AUTO: 2.73 THOUSANDS/ÂΜL (ref 1.85–7.62)
NEUTS SEG NFR BLD AUTO: 50 % (ref 43–75)
NRBC BLD AUTO-RTO: 0 /100 WBCS
PLATELET # BLD AUTO: 240 THOUSANDS/UL (ref 149–390)
PMV BLD AUTO: 10.7 FL (ref 8.9–12.7)
RBC # BLD AUTO: 4.46 MILLION/UL (ref 3.81–5.12)
WBC # BLD AUTO: 5.49 THOUSAND/UL (ref 4.31–10.16)

## 2022-11-30 NOTE — PROGRESS NOTES
INTERNAL MEDICINE FOLLOW-UP OFFICE VISIT  [facility] of BEHAVIORAL MEDICINE AT [facility]    NAME: Lj Escobar  AGE: 47 y o  SEX: female  : 1970   MRN: [id number]    DATE: 2022  TIME: 10:55 AM    Assessment and Plan     Diagnoses and all orders for this visit:    Chest tightness  -     Echo stress test, exercise; Future   complains of pain in the chest, both left and right for the last one month off and on  She does not have pain today  Was told to get a stress test done in order to rule out cardiac causes even though it looks less likely and more due to anxiety    Pleuritic pain  -     XR chest pa & lateral; Future   Will get back to her with the results of the chest x-ray    Encounter for screening mammogram for breast cancer  -     Mammo screening bilateral w 3d & cad; Future    Screening for colorectal cancer  -     Ambulatory referral for Colonoscopy; Future        - Counseling Documentation: patient was counseled regarding: instructions for management, risk factor reductions, prognosis, patient and family education, risks and benefits of treatment options and importance of compliance with treatment  - Medication Side Effects: Adverse side effects of medications were reviewed with the patient/guardian today  Return to office in:  As needed    Chief Complaint     Chief Complaint   Patient presents with   • Chest Pain     Chest tightness below breast, neck and jaw pain with fatigue   • Tingling     On left side of face        History of Present Illness     HPI  Complains of pain in the left side of the chest under the breast and also sometimes on the right side off and on for about a month  She has a lot of anxiety and thinks it could be due to that    The pain is reproducible    The following portions of the patient's history were reviewed and updated as appropriate: allergies, current medications, past family history, past medical history, past social history, past surgical history and problem list     Review of Systems     Review of Systems   Constitutional: Negative for chills, diaphoresis, fatigue and fever  HENT: Negative for congestion, ear discharge, ear pain, hearing loss, postnasal drip, rhinorrhea, sinus pressure, sinus pain, sneezing, sore throat and voice change  Eyes: Negative for pain, discharge, redness and visual disturbance  Respiratory: Positive for chest tightness  Negative for cough, shortness of breath and wheezing  Cardiovascular: Negative for chest pain, palpitations and leg swelling  Gastrointestinal: Negative for abdominal distention, abdominal pain, blood in stool, constipation, diarrhea, nausea and vomiting  Endocrine: Negative for cold intolerance, heat intolerance, polydipsia, polyphagia and polyuria  Genitourinary: Negative for dysuria, flank pain, frequency, hematuria and urgency  Musculoskeletal: Negative for arthralgias, back pain, gait problem, joint swelling, myalgias, neck pain and neck stiffness  Skin: Negative for rash  Neurological: Negative for dizziness, tremors, syncope, facial asymmetry, speech difficulty, weakness, light-headedness, numbness and headaches  Hematological: Does not bruise/bleed easily  Psychiatric/Behavioral: Negative for behavioral problems, confusion and sleep disturbance  The patient is nervous/anxious          Active Problem List     Patient Active Problem List   Diagnosis   • PTSD (post-traumatic stress disorder)   • HSV (herpes simplex virus) infection   • Family history of breast cancer   • Bradycardia   • Essential hypertension   • Anxiety   • Genetic testing   • Family history of ovarian cancer   • Neck pain   • Mixed hyperlipidemia   • Acute low back pain   • Depression, recurrent (HCC)       Objective     /90 (BP Location: Left arm, Patient Position: Sitting, Cuff Size: Standard) Comment: F  Pulse (!) 50   Temp 98 3 °F (36 8 °C) (Tympanic) Comment: F  Resp 16   Ht 5' 6 5" (1 689 m)   Wt 62 5 kg (137 lb 12 8 oz)   SpO2 99%   BMI 21 91 kg/m²     Physical Exam  Constitutional:       General: She is not in acute distress  Appearance: She is well-developed and well-nourished  She is not diaphoretic  HENT:      Head: Normocephalic and atraumatic  Right Ear: External ear normal       Left Ear: External ear normal       Nose: Nose normal       Mouth/Throat:      Mouth: Oropharynx is clear and moist    Eyes:      General: No scleral icterus  Right eye: No discharge  Left eye: No discharge  Extraocular Movements: EOM normal       Conjunctiva/sclera: Conjunctivae normal    Neck:      Thyroid: No thyromegaly  Vascular: No JVD  Trachea: No tracheal deviation  Cardiovascular:      Rate and Rhythm: Normal rate and regular rhythm  Pulses: Intact distal pulses  Heart sounds: Normal heart sounds  No murmur heard  No friction rub  No gallop  Pulmonary:      Effort: Pulmonary effort is normal  No respiratory distress  Breath sounds: Normal breath sounds  No wheezing or rales  Chest:      Chest wall: No tenderness  Abdominal:      General: Bowel sounds are normal  There is no distension  Palpations: Abdomen is soft  Tenderness: There is no abdominal tenderness  There is no guarding or rebound  Musculoskeletal:         General: No tenderness or edema  Normal range of motion  Cervical back: Normal range of motion and neck supple  Lymphadenopathy:      Cervical: No cervical adenopathy  Skin:     General: Skin is warm and dry  Findings: No erythema or rash  Neurological:      Mental Status: She is alert and oriented to person, place, and time  Cranial Nerves: No cranial nerve deficit  Motor: No abnormal muscle tone        Coordination: Coordination normal    Psychiatric:         Mood and Affect: Mood and affect normal          Judgment: Judgment normal            Current Medications       Current Outpatient Medications:   • acyclovir (ZOVIRAX) 5 % ointment, Apply topically 4 (four) times a day, Disp: 30 g, Rfl: 0  •  amLODIPine (NORVASC) 5 mg tablet, Take 5 mg by mouth daily, Disp: , Rfl: 0  •  atorvastatin (LIPITOR) 20 mg tablet, Take 1 tablet (20 mg total) by mouth daily, Disp: 90 tablet, Rfl: 3  •  clonazePAM (KlonoPIN) 1 mg tablet, Take 1 mg by mouth 3 (three) times a day, Disp: , Rfl: 0  •  estradiol (Yuvafem) 10 MCG TABS vaginal tablet, PLACE 1 TABLET IN THE VAGINA TWICE WEEKLY, Disp: 24 tablet, Rfl: 0  •  NON FORMULARY, Medical Marijuana, Disp: , Rfl:   •  traZODone (DESYREL) 100 mg tablet, Take 50 mg by mouth daily at bedtime as needed  , Disp: , Rfl:   •  acyclovir (ZOVIRAX) 400 MG tablet, TAKE 1 TABLET BY MOUTH TWICE A DAY **MUST MAKE APPOINTMENT FOR FURTHER REFILLS**, Disp: 60 tablet, Rfl: 0    Health Maintenance     Health Maintenance   Topic Date Due   • Hepatitis B Vaccine (1 of 3 - 3-dose series) Never done   • COVID-19 Vaccine (1) Never done   • BMI: Adult  Never done   • Breast Cancer Screening: Mammogram  03/21/2020   • Annual Physical  11/17/2021   • Colorectal Cancer Screening  01/30/2022   • Influenza Vaccine (1) 09/01/2022   • Cervical Cancer Screening  11/20/2023   • Depression Remission PHQ  11/30/2023   • DTaP,Tdap,and Td Vaccines (2 - Td or Tdap) 11/29/2027   • HIV Screening  Completed   • Hepatitis C Screening  Completed   • Pneumococcal Vaccine: Pediatrics (0 to 5 Years) and At-Risk Patients (6 to 59 Years)  Aged Out   • HIB Vaccine  Aged Out   • IPV Vaccine  Aged Out   • Hepatitis A Vaccine  Aged Out   • Meningococcal ACWY Vaccine  Aged Out   • HPV Vaccine  Aged [employer] History   Administered Date(s) Administered   • Fluzone Split Quad 0 5 mL 11/29/2017   • INFLUENZA 01/28/2017, 11/29/2017   • Tdap 11/29/2017         MD [facility] AT [facility]

## 2022-12-01 LAB
ALBUMIN SERPL BCP-MCNC: 3.6 G/DL (ref 3.5–5)
ALP SERPL-CCNC: 72 U/L (ref 46–116)
ALT SERPL W P-5'-P-CCNC: 43 U/L (ref 12–78)
ANION GAP SERPL CALCULATED.3IONS-SCNC: 6 MMOL/L (ref 4–13)
AST SERPL W P-5'-P-CCNC: 26 U/L (ref 5–45)
BILIRUB SERPL-MCNC: 0.99 MG/DL (ref 0.2–1)
BUN SERPL-MCNC: 14 MG/DL (ref 5–25)
CALCIUM SERPL-MCNC: 9 MG/DL (ref 8.3–10.1)
CHLORIDE SERPL-SCNC: 108 MMOL/L (ref 96–108)
CHOLEST SERPL-MCNC: 167 MG/DL
CO2 SERPL-SCNC: 27 MMOL/L (ref 21–32)
CREAT SERPL-MCNC: 0.9 MG/DL (ref 0.6–1.3)
GFR SERPL CREATININE-BSD FRML MDRD: 73 ML/MIN/1.73SQ M
GLUCOSE P FAST SERPL-MCNC: 75 MG/DL (ref 65–99)
HDLC SERPL-MCNC: 67 MG/DL
LDLC SERPL CALC-MCNC: 89 MG/DL (ref 0–100)
NONHDLC SERPL-MCNC: 100 MG/DL
POTASSIUM SERPL-SCNC: 3.7 MMOL/L (ref 3.5–5.3)
PROT SERPL-MCNC: 7.5 G/DL (ref 6.4–8.4)
SODIUM SERPL-SCNC: 141 MMOL/L (ref 135–147)
TRIGL SERPL-MCNC: 53 MG/DL
TSH SERPL DL<=0.05 MIU/L-ACNC: 0.84 UIU/ML (ref 0.45–4.5)

## 2022-12-01 RX ORDER — ACYCLOVIR 400 MG/1
TABLET ORAL
Qty: 60 TABLET | Refills: 0 | OUTPATIENT
Start: 2022-12-01 | End: 2022-12-31

## 2022-12-01 RX ORDER — ACYCLOVIR 50 MG/G
OINTMENT TOPICAL 4 TIMES DAILY
Qty: 30 G | Refills: 0 | OUTPATIENT
Start: 2022-12-01

## 2022-12-07 DIAGNOSIS — B00.9 HSV (HERPES SIMPLEX VIRUS) INFECTION: ICD-10-CM

## 2022-12-07 RX ORDER — ACYCLOVIR 50 MG/G
OINTMENT TOPICAL 4 TIMES DAILY
Qty: 30 G | Refills: 0 | Status: SHIPPED | OUTPATIENT
Start: 2022-12-07

## 2022-12-07 RX ORDER — ACYCLOVIR 400 MG/1
TABLET ORAL
Qty: 60 TABLET | Refills: 0 | Status: SHIPPED | OUTPATIENT
Start: 2022-12-07 | End: 2023-12-07

## 2022-12-08 ENCOUNTER — APPOINTMENT (OUTPATIENT)
Dept: RADIOLOGY | Facility: CLINIC | Age: 52
End: 2022-12-08

## 2022-12-08 DIAGNOSIS — R07.81 PLEURITIC PAIN: ICD-10-CM

## 2022-12-12 ENCOUNTER — VBI (OUTPATIENT)
Dept: ADMINISTRATIVE | Facility: OTHER | Age: 52
End: 2022-12-12

## 2022-12-15 ENCOUNTER — HOSPITAL ENCOUNTER (OUTPATIENT)
Dept: MAMMOGRAPHY | Facility: CLINIC | Age: 52
End: 2022-12-15

## 2022-12-15 ENCOUNTER — HOSPITAL ENCOUNTER (OUTPATIENT)
Dept: ULTRASOUND IMAGING | Facility: CLINIC | Age: 52
End: 2022-12-15

## 2022-12-15 VITALS — HEIGHT: 67 IN | BODY MASS INDEX: 21.63 KG/M2 | WEIGHT: 137.79 LBS

## 2022-12-15 DIAGNOSIS — N64.4 BREAST PAIN, LEFT: ICD-10-CM

## 2023-01-05 ENCOUNTER — OFFICE VISIT (OUTPATIENT)
Dept: GASTROENTEROLOGY | Facility: CLINIC | Age: 53
End: 2023-01-05

## 2023-01-05 VITALS
HEART RATE: 71 BPM | OXYGEN SATURATION: 98 % | WEIGHT: 142 LBS | SYSTOLIC BLOOD PRESSURE: 128 MMHG | BODY MASS INDEX: 22.82 KG/M2 | HEIGHT: 66 IN | DIASTOLIC BLOOD PRESSURE: 70 MMHG

## 2023-01-05 DIAGNOSIS — Z12.12 SCREENING FOR COLORECTAL CANCER: ICD-10-CM

## 2023-01-05 DIAGNOSIS — Z12.11 SCREENING FOR COLORECTAL CANCER: ICD-10-CM

## 2023-01-05 DIAGNOSIS — Z86.010 HISTORY OF COLON POLYPS: Primary | ICD-10-CM

## 2023-01-05 NOTE — PROGRESS NOTES
[facility] - Outpatient Follow-up Note  Mittie Limb 47 y o  female MRN: [id number]  Encounter: [id number]          ASSESSMENT AND PLAN:      1  Screening for colorectal cancer  2  History of colon polyps    Patient presents to schedule her repeat colonoscopy  Colonoscopy 21 showed 4 hyperplastic polyps that were removed; the prep was poor in the left colon and repeat colonoscopy was recommended in 1 year  Will plan for colonoscopy to investigate     ______________________________________________________________________    SUBJECTIVE:  Patient is a very pleasant 47year old female who presents to the office to schedule a repeat colonoscopy  She had a Colonoscopy 21 which showed 4 hyperplastic polyps that were removed; the prep was poor in the left colon and repeat colonoscopy was recommended in 1 year  Patient reports she did not follow the prep instructions and did consume fries after doing the prep  She understands the importance of complete compliance with the bowel preparation this time  She reports occasional abdominal discomfort when she delays evacuating her bowels/having a bowel movement  Otherwise, no GI complaints  No rectal bleeding  No family history of colon cancer  Her father had colon polyps  She had her mammogram in December  REVIEW OF SYSTEMS IS OTHERWISE NEGATIVE        Historical Information   Past Medical History:   Diagnosis Date   • Abnormal Pap smear of cervix    • Anxiety    • Bradycardia    • Gastric ulcer    • Hernia    • Hypertension    • Lactose intolerance    • MDD (major depressive disorder)    • PTSD (post-traumatic stress disorder)      Past Surgical History:   Procedure Laterality Date   • BREAST CYST EXCISION Left     benign   • CERVICAL BIOPSY  W/ LOOP ELECTRODE EXCISION     •  SECTION     • HYSTERECTOMY  2008    Partial   • OOPHORECTOMY Bilateral 2015   • PARTIAL HYSTERECTOMY  2014    supracervical hysterectomy     Social History   Social History     Substance and Sexual Activity   Alcohol Use Yes   • Alcohol/week: 1 0 standard drink   • Types: 1 Glasses of wine per week     Social History     Substance and Sexual Activity   Drug Use Yes   • Types: Marijuana    Comment: Medical Marijuana Card     Social History     Tobacco Use   Smoking Status Never   Smokeless Tobacco Never     Family History   Problem Relation Age of Onset   • Diabetes Mother    • Lung cancer Father         non smoker   • Colon polyps Father    • Cancer Father    • Pancreatic cancer Maternal Grandmother    • Heart disease Paternal Grandmother    • Hypertension Paternal Grandmother    • Breast cancer Maternal Aunt 39   • Uterine cancer Maternal Aunt    • Breast cancer Maternal Aunt 46   • Stroke Sister    • No Known Problems Brother    • Other Sister         Hydrocephalus   • Colon cancer Neg Hx    • Ovarian cancer Neg Hx    • Cervical cancer Neg Hx        Meds/Allergies       Current Outpatient Medications:   •  acyclovir (ZOVIRAX) 400 MG tablet  •  acyclovir (ZOVIRAX) 5 % ointment  •  amLODIPine (NORVASC) 5 mg tablet  •  atorvastatin (LIPITOR) 20 mg tablet  •  clonazePAM (KlonoPIN) 1 mg tablet  •  estradiol (Yuvafem) 10 MCG TABS vaginal tablet  •  NON FORMULARY  •  traZODone (DESYREL) 100 mg tablet    Allergies   Allergen Reactions   • Lisinopril Angioedema           Objective     Blood pressure 128/70, pulse 71, height 5' 6" (1 676 m), weight 64 4 kg (142 lb), SpO2 98 %, not currently breastfeeding  Body mass index is 22 92 kg/m²  PHYSICAL EXAM:      General Appearance:   Alert, cooperative, no distress   HEENT:   Normocephalic, atraumatic, anicteric      Neck:  Supple, symmetrical, trachea midline   Lungs:   Clear to auscultation bilaterally; no rales, rhonchi or wheezing; respirations unlabored    Heart[de-identified]   Regular rate and rhythm; no murmur, rub, or gallop     Abdomen:   Soft, non-tender, non-distended; normal bowel sounds; no masses, no organomegaly    Genitalia:   Deferred    Rectal:   Deferred    Extremities:  No cyanosis, clubbing or edema    Pulses:  2+ and symmetric    Skin:  No jaundice, rashes, or lesions    Lymph nodes:  No palpable cervical lymphadenopathy        Lab Results:   No visits with results within 1 Day(s) from this visit     Latest known visit with results is:   Appointment on 11/30/2022   Component Date Value   • Cholesterol 11/30/2022 167    • Triglycerides 11/30/2022 53    • HDL, Direct 11/30/2022 67    • LDL Calculated 11/30/2022 89    • Non-HDL-Chol (CHOL-HDL) 11/30/2022 100    • TSH 3RD GENERATON 11/30/2022 0 844    • Sodium 11/30/2022 141    • Potassium 11/30/2022 3 7    • Chloride 11/30/2022 108    • CO2 11/30/2022 27    • ANION GAP 11/30/2022 6    • BUN 11/30/2022 14    • Creatinine 11/30/2022 0 90    • Glucose, Fasting 11/30/2022 75    • Calcium 11/30/2022 9 0    • AST 11/30/2022 26    • ALT 11/30/2022 43    • Alkaline Phosphatase 11/30/2022 72    • Total Protein 11/30/2022 7 5    • Albumin 11/30/2022 3 6    • Total Bilirubin 11/30/2022 0 99    • eGFR 11/30/2022 73    • WBC 11/30/2022 5 49    • RBC 11/30/2022 4 46    • Hemoglobin 11/30/2022 13 0    • Hematocrit 11/30/2022 40 4    • MCV 11/30/2022 91    • MCH 11/30/2022 29 1    • MCHC 11/30/2022 32 2    • RDW 11/30/2022 14 0    • MPV 11/30/2022 10 7    • Platelets 68/31/7216 240    • nRBC 11/30/2022 0    • Neutrophils Relative 11/30/2022 50    • Immat GRANS % 11/30/2022 0    • Lymphocytes Relative 11/30/2022 37    • Monocytes Relative 11/30/2022 9    • Eosinophils Relative 11/30/2022 3    • Basophils Relative 11/30/2022 1    • Neutrophils Absolute 11/30/2022 2 73    • Immature Grans Absolute 11/30/2022 0 01    • Lymphocytes Absolute 11/30/2022 2 05    • Monocytes Absolute 11/30/2022 0 49    • Eosinophils Absolute 11/30/2022 0 17    • Basophils Absolute 11/30/2022 0 04          Radiology Results:   XR chest pa & lateral    Result Date: 12/10/2022  Narrative: CHEST INDICATION:   R07 81: Pleurodynia  COMPARISON:  None EXAM PERFORMED/VIEWS:  XR CHEST PA & LATERAL FINDINGS: Cardiomediastinal silhouette appears unremarkable  The lungs are clear  No pneumothorax or pleural effusion  Osseous structures appear within normal limits for patient age  Impression: No acute cardiopulmonary disease  Workstation performed: [id number]     Mammo diagnostic bilateral w 3d & cad, US breast left limited (diagnostic)    Result Date: 12/15/2022  Narrative: DIAGNOSIS: Breast pain, left TECHNIQUE: Digital diagnostic mammography was performed  Computer Aided Detection (CAD) analyzed all applicable images  Ultrasound of the bilateral breast(s) was performed  COMPARISONS: Prior breast imaging dated: 03/21/2019 and 03/21/2019 RELEVANT HISTORY: Family Breast Cancer History: History of breast cancer in Maternal Aunt, Maternal Aunt  Family Medical History: Family medical history includes breast cancer in 2 relatives (maternal aunt, maternal aunt)  Personal History: Hormone history includes birth control  Surgical history includes breast excisional biopsy and oophorectomy  No known relevant medical history  RISK ASSESSMENT: 5 Year Tyrer-Cuzick: 1 71 % 10 Year Tyrer-Cuzick: 3 7 % Lifetime Tyrer-Cuzick: 13 4 % TISSUE DENSITY: The breasts are heterogeneously dense, which may obscure small masses  INDICATION: Levi Gomez is a 47 y o  female presenting for left breast pain  FINDINGS: Bilateral Mammo diagnostic bilateral w 3d & cad There are no suspicious masses, grouped microcalcifications or areas of unexplained architectural distortion  The skin and nipple areolar complex are unremarkable  Left US breast left limited (diagnostic) There are no suspicious masses or areas of architectural distortion  Targeted ultrasound over the areas of pain demonstrates no hypoechoic mass or architectural distortion  Impression: No evidence of malignancy   ASSESSMENT/BI-RADS CATEGORY: Left: 1 - Negative Right: 1 - Negative Overall: 1 - Negative RECOMMENDATION:      - Routine screening mammogram in 1 year for both breasts   Workstation ID: [id number]

## 2023-01-05 NOTE — PATIENT INSTRUCTIONS
Scheduled date of colonoscopy (as of today): 3/8/23  Physician performing colonoscopy: [place]  Location of colonoscopy: [facility]  Bowel prep reviewed with patient: Miralax  Instructions reviewed with patient by: Cherry CALLOWAY  Clearances:

## 2023-01-09 ENCOUNTER — HOSPITAL ENCOUNTER (OUTPATIENT)
Dept: NON INVASIVE DIAGNOSTICS | Facility: CLINIC | Age: 53
Discharge: HOME | End: 2023-01-09

## 2023-01-26 ENCOUNTER — HOSPITAL ENCOUNTER (OUTPATIENT)
Dept: NON INVASIVE DIAGNOSTICS | Facility: CLINIC | Age: 53
Discharge: HOME | End: 2023-01-26

## 2023-01-30 ENCOUNTER — OFFICE VISIT (OUTPATIENT)
Dept: INTERNAL MEDICINE CLINIC | Facility: CLINIC | Age: 53
End: 2023-01-30

## 2023-01-30 VITALS
DIASTOLIC BLOOD PRESSURE: 76 MMHG | BODY MASS INDEX: 22.88 KG/M2 | WEIGHT: 142.4 LBS | HEART RATE: 67 BPM | TEMPERATURE: 97.3 F | HEIGHT: 66 IN | OXYGEN SATURATION: 98 % | SYSTOLIC BLOOD PRESSURE: 116 MMHG | RESPIRATION RATE: 18 BRPM

## 2023-01-30 DIAGNOSIS — E78.2 MIXED HYPERLIPIDEMIA: ICD-10-CM

## 2023-01-30 DIAGNOSIS — F41.9 ANXIETY: ICD-10-CM

## 2023-01-30 DIAGNOSIS — L98.9 SKIN LESIONS: ICD-10-CM

## 2023-01-30 DIAGNOSIS — I10 ESSENTIAL HYPERTENSION: Primary | ICD-10-CM

## 2023-01-30 DIAGNOSIS — M54.2 NECK PAIN: ICD-10-CM

## 2023-01-30 DIAGNOSIS — F33.9 DEPRESSION, RECURRENT (HCC): ICD-10-CM

## 2023-01-30 PROBLEM — M54.50 ACUTE LOW BACK PAIN: Status: RESOLVED | Noted: 2021-09-30 | Resolved: 2023-01-30

## 2023-01-30 RX ORDER — ATORVASTATIN CALCIUM 20 MG/1
20 TABLET, FILM COATED ORAL DAILY
Qty: 90 TABLET | Refills: 3 | Status: SHIPPED | OUTPATIENT
Start: 2023-01-30

## 2023-01-30 RX ORDER — METHOCARBAMOL 500 MG/1
500 TABLET, FILM COATED ORAL 2 TIMES DAILY
Qty: 20 TABLET | Refills: 0 | Status: SHIPPED | OUTPATIENT
Start: 2023-01-30

## 2023-01-30 NOTE — PROGRESS NOTES
INTERNAL MEDICINE OFFICE VISIT  [facility], [address]  Tel: [phone]      NAME: Bernarda Madrigal  AGE: 47 y o  SEX: female  : 1970   MRN: [id number]    DATE: 2023  TIME: 8:24 AM      Assessment and Plan:  1  Essential hypertension  Continue amlodipine 5 mg daily    2  Mixed hyperlipidemia  Continue atorvastatin   - atorvastatin (LIPITOR) 20 mg tablet; Take 1 tablet (20 mg total) by mouth daily  Dispense: 90 tablet; Refill: 3    3  Neck pain  Was given methocarbamol and told to go for physical therapy for the neck pain  - methocarbamol (ROBAXIN) 500 mg tablet; Take 1 tablet (500 mg total) by mouth 2 (two) times a day  Dispense: 20 tablet; Refill: 0  - Ambulatory Referral to Physical Therapy; Future    4  Depression, recurrent ([facility] [address] )  Follow-up with psychiatry    5  Anxiety  Continue medications and follow-up with psychiatry    6  Skin lesions    - Ambulatory Referral to Dermatology; Future      - Counseling Documentation: patient was counseled regarding: diagnostic results, instructions for management, risk factor reductions, prognosis, patient and family education, risks and benefits of treatment options and importance of compliance with treatment  - Medication Side Effects: Adverse side effects of medications were reviewed with the patient/guardian today  Return for follow up visit in 6 months or earlier, if needed  Chief Complaint:  Chief Complaint   Patient presents with   • Annual Exam     Neck and Shoulder pain on left side         History of Present Illness:   Blood pressure is stable on present medication  Cholesterol is stable on the atorvastatin  Complains of pain in her neck with restriction of movements for the last couple of weeks    Depression and anxiety are stable on medication and she follows up with psychiatry  Has some new skin lesions on the face and was told to see dermatology      Active Problem List:  Patient Active Problem List   Diagnosis   • PTSD (post-traumatic stress disorder)   • HSV (herpes simplex virus) infection   • Family history of breast cancer   • Bradycardia   • Essential hypertension   • Anxiety   • Genetic testing   • Family history of ovarian cancer   • Neck pain   • Mixed hyperlipidemia   • Depression, recurrent ([address] )         Past Medical History:  Past Medical History:   Diagnosis Date   • Abnormal Pap smear of cervix    • Acute low back pain 2021   • Anxiety    • Bradycardia    • Gastric ulcer    • Hernia    • Hypertension    • Lactose intolerance    • MDD (major depressive disorder)    • PTSD (post-traumatic stress disorder)          Past Surgical History:  Past Surgical History:   Procedure Laterality Date   • BREAST CYST EXCISION Left     benign   • CERVICAL BIOPSY  W/ LOOP ELECTRODE EXCISION     •  SECTION     • HYSTERECTOMY      Partial   • OOPHORECTOMY Bilateral    • PARTIAL HYSTERECTOMY  2014    supracervical hysterectomy         Family History:  Family History   Problem Relation Age of Onset   • Diabetes Mother    • Lung cancer Father         non smoker   • Colon polyps Father    • Cancer Father    • Pancreatic cancer Maternal Grandmother    • Heart disease Paternal Grandmother    • Hypertension Paternal Grandmother    • Breast cancer Maternal Aunt 40   • Uterine cancer Maternal Aunt    • Breast cancer Maternal Aunt 46   • Stroke Sister    • No Known Problems Brother    • Other Sister         Hydrocephalus   • Colon cancer Neg Hx    • Ovarian cancer Neg Hx    • Cervical cancer Neg Hx          Social History:  Social History     Socioeconomic History   • Marital status: /Civil Union     Spouse name: None   • Number of children: None   • Years of education: None   • Highest education level: None   Occupational History   • None   Tobacco Use   • Smoking status: Never   • Smokeless tobacco: Never   Vaping Use   • Vaping Use: Never used   Substance and Sexual Activity   • Alcohol use: Yes     Alcohol/week: 1 0 standard drink     Types: 1 Glasses of wine per week   • Drug use: Yes     Types: Marijuana     Comment: Medical Marijuana Card   • Sexual activity: Yes     Partners: Male     Birth control/protection: Surgical, Female Sterilization   Other Topics Concern   • None   Social History Narrative   • None     Social Determinants of Health     Financial Resource Strain: Not on file   Food Insecurity: Not on file   Transportation Needs: Not on file   Physical Activity: Not on file   Stress: No Stress Concern Present   • Feeling of Stress : Not at all   Social Connections: Not on file   Intimate Partner Violence: Not on file   Housing Stability: Not on file         Allergies:   Allergies   Allergen Reactions   • Lisinopril Angioedema         Medications:    Current Outpatient Medications:   •  acyclovir (ZOVIRAX) 400 MG tablet, ONE TABLET TWICE A DAY, Disp: 60 tablet, Rfl: 0  •  acyclovir (ZOVIRAX) 5 % ointment, Apply topically 4 (four) times a day As needed, Disp: 30 g, Rfl: 0  •  amLODIPine (NORVASC) 5 mg tablet, Take 5 mg by mouth daily, Disp: , Rfl: 0  •  atorvastatin (LIPITOR) 20 mg tablet, Take 1 tablet (20 mg total) by mouth daily, Disp: 90 tablet, Rfl: 3  •  clonazePAM (KlonoPIN) 1 mg tablet, Take 1 mg by mouth 3 (three) times a day, Disp: , Rfl: 0  •  estradiol (Yuvafem) 10 MCG TABS vaginal tablet, PLACE 1 TABLET IN THE VAGINA TWICE WEEKLY, Disp: 24 tablet, Rfl: 0  •  methocarbamol (ROBAXIN) 500 mg tablet, Take 1 tablet (500 mg total) by mouth 2 (two) times a day, Disp: 20 tablet, Rfl: 0  •  NON FORMULARY, Medical Marijuana, Disp: , Rfl:   •  traZODone (DESYREL) 100 mg tablet, Take 50 mg by mouth daily at bedtime as needed  , Disp: , Rfl:       The following portions of the patient's history were reviewed and updated as appropriate: past medical history, past surgical history, family history, social history, allergies, current medications and active problem list       Review of Systems:  Constitutional: Denies fever, chills, weight gain, weight loss, fatigue  Eyes: Denies eye redness, eye discharge, double vision, change in visual acuity  ENT: Denies hearing loss, tinnitus, sneezing, nasal congestion, nasal discharge, sore throat   Respiratory: Denies cough, expectoration, hemoptysis, shortness of breath, wheezing  Cardiovascular: Denies chest pain, palpitations, lower extremity swelling, orthopnea, PND  Gastrointestinal: Denies abdominal pain, heartburn, nausea, vomiting, hematemesis, diarrhea, bloody stools  Genito-Urinary: Denies dysuria, frequency, difficulty in micturition, nocturia, incontinence  Musculoskeletal: Denies back pain, joint pain, muscle pain  Has been in the neck posteriorly with restricted movements  Neurologic: Denies confusion, lightheadedness, syncope, headache, focal weakness, sensory changes, seizures  Endocrine: Denies polyuria, polydipsia, temperature intolerance  Allergy and Immunology: Denies hives, insect bite sensitivity  Hematological and Lymphatic: Denies bleeding problems, swollen glands   Psychological: Has anxiety and depression,   Dermatological: Denies pruritus, rash, skin lesion changes      Vitals:  Vitals:    01/30/23 0807   BP: 116/76   Pulse: 67   Resp: 18   Temp: (!) 97 3 °F (36 3 °C)   SpO2: 98%       Body mass index is 22 98 kg/m²  Weight (last 2 days)     Date/Time Weight    01/30/23 0807 64 6 (142 4)            Physical Examination:  General: Patient is not in acute distress  Awake, alert, responding to commands  No weight gain or loss  Head: Normocephalic  Atraumatic  Eyes: Conjunctiva and lids with no swelling, erythema or discharge  Both pupils normal sized, round and reactive to light  Sclera nonicteric  ENT: External examination of nose and ear normal  Otoscopic examination shows translucent tympanic membranes with patent canals without erythema   Oropharynx moist with no erythema, edema, exudate or lesions  Neck: Supple  JVP not raised  Trachea midline  No masses  No thyromegaly  Lungs: No signs of increased work of breathing or respiratory distress  Bilateral bronchovascular breath sounds with no crackles or rhonchi  Chest wall: No tenderness  Cardiovascular: Normal PMI  No thrills  Regular rate and rhythm  S1 and S2 normal  No murmur, rub or gallop  Gastrointestinal: Abdomen soft, nontender  No guarding or rigidity  Liver and spleen not palpable  Bowel sounds present  Neurologic: Cranial nerves II-XII intact   Cortical functions normal  Motor system - Reflexes 2+ and symmetrical  Sensations normal  Musculoskeletal: Gait normal  No joint tenderness  Integumentary: Skin normal with no rash or lesions  Lymphatic: No palpable lymph nodes in neck, axilla or groin  Extremities: No clubbing, cyanosis, edema or varicosities  Psychological: Judgement and insight normal   Anxious      Laboratory Results:  CBC with diff:   Lab Results   Component Value Date    WBC 5 49 11/30/2022    RBC 4 46 11/30/2022    HGB 13 0 11/30/2022    HCT 40 4 11/30/2022    MCV 91 11/30/2022    MCH 29 1 11/30/2022    RDW 14 0 11/30/2022     11/30/2022       CMP:  Lab Results   Component Value Date    CREATININE 0 90 11/30/2022    BUN 14 11/30/2022    K 3 7 11/30/2022     11/30/2022    CO2 27 11/30/2022    ALKPHOS 72 11/30/2022    ALT 43 11/30/2022    AST 26 11/30/2022       No results found for: HGBA1C, MG, PHOS    No results found for: TROPONINI, CKMB, CKTOTAL    Lipid Profile:   No results found for: CHOL  Lab Results   Component Value Date    HDL 67 11/30/2022    HDL 65 06/16/2022     Lab Results   Component Value Date    LDLCALC 89 11/30/2022    LDLCALC 201 (H) 06/16/2022     Lab Results   Component Value Date    TRIG 53 11/30/2022    TRIG 71 06/16/2022       Imaging Results:  Mammo diagnostic bilateral w 3d & cad, US breast left limited (diagnostic)  Narrative: DIAGNOSIS: Breast pain, left     TECHNIQUE:   Digital diagnostic mammography was performed  Computer Aided Detection   (CAD) analyzed all applicable images  Ultrasound of the bilateral breast(s) was performed  COMPARISONS: Prior breast imaging dated: 03/21/2019 and 03/21/2019    RELEVANT HISTORY:   Family Breast Cancer History: History of breast cancer in Maternal Aunt,   Maternal Aunt  Family Medical History: Family medical history includes breast cancer in 2   relatives (maternal aunt, maternal aunt)  Personal History: Hormone history includes birth control  Surgical history   includes breast excisional biopsy and oophorectomy  No known relevant   medical history  RISK ASSESSMENT:   5 Year Tyrer-Cuzick: 1 71 %  10 Year Tyrer-Cuzick: 3 7 %  Lifetime Tyrer-Cuzick: 13 4 %    TISSUE DENSITY:   The breasts are heterogeneously dense, which may obscure small masses  INDICATION: Demetria Barthel is a 47 y o  female presenting for left breast   pain  FINDINGS:   Bilateral  Mammo diagnostic bilateral w 3d & cad  There are no suspicious masses, grouped microcalcifications or areas of   unexplained architectural distortion  The skin and nipple areolar complex   are unremarkable  Left  US breast left limited (diagnostic)  There are no suspicious masses or areas of architectural   distortion  Targeted ultrasound over the areas of pain demonstrates no   hypoechoic mass or architectural distortion  Impression: No evidence of malignancy  ASSESSMENT/BI-RADS CATEGORY:  Left: 1 - Negative  Right: 1 - Negative  Overall: 1 - Negative    RECOMMENDATION:       - Routine screening mammogram in 1 year for both breasts      Workstation ID: [id number]        Health Maintenance:  Health Maintenance   Topic Date Due   • COVID-19 Vaccine (1) Never done   • Annual Physical  11/17/2021   • Colorectal Cancer Screening  01/30/2022   • Influenza Vaccine (1) 09/01/2022   • Depression Remission PHQ  05/30/2023   • Cervical Cancer Screening  11/20/2023   • Breast Cancer Screening: Mammogram  12/15/2023   • BMI: Adult  01/05/2024   • DTaP,Tdap,and Td Vaccines (2 - Td or Tdap) 11/29/2027   • HIV Screening  Completed   • Hepatitis C Screening  Completed   • Pneumococcal Vaccine: Pediatrics (0 to 5 Years) and At-Risk Patients (6 to 59 Years)  Aged Out   • HIB Vaccine  Aged Out   • IPV Vaccine  Aged Out   • Hepatitis A Vaccine  Aged Out   • Meningococcal ACWY Vaccine  Aged Out   • HPV Vaccine  Aged [employer] History   Administered Date(s) Administered   • Fluzone Split Quad 0 5 mL 11/29/2017   • INFLUENZA 01/28/2017, 11/29/2017   • Tdap 11/29/2017         Marcos Causey MD  1/30/2023,8:24 AM

## 2023-02-07 ENCOUNTER — ANNUAL EXAM (OUTPATIENT)
Dept: OBGYN CLINIC | Age: 53
End: 2023-02-07

## 2023-02-07 VITALS
BODY MASS INDEX: 22.98 KG/M2 | DIASTOLIC BLOOD PRESSURE: 70 MMHG | SYSTOLIC BLOOD PRESSURE: 118 MMHG | WEIGHT: 143 LBS | HEIGHT: 66 IN

## 2023-02-07 DIAGNOSIS — Z01.419 ENCOUNTER FOR GYNECOLOGICAL EXAMINATION (GENERAL) (ROUTINE) WITHOUT ABNORMAL FINDINGS: Primary | ICD-10-CM

## 2023-02-07 DIAGNOSIS — Z12.31 ENCOUNTER FOR SCREENING MAMMOGRAM FOR MALIGNANT NEOPLASM OF BREAST: ICD-10-CM

## 2023-02-07 NOTE — PROGRESS NOTES
Evans Garcia   1970    CC:  Yearly exam    A:  Yearly exam      Problem List Items Addressed This Visit    None  Visit Diagnoses     Encounter for gynecological examination (general) (routine) without abnormal findings    -  Primary    Encounter for screening mammogram for malignant neoplasm of breast        Relevant Orders    Mammo screening bilateral w 3d & cad          P:   Pap up to date  We reviewed ASCCP guidelines for Pap testing and non-heritable nature cervical cancer  Would screen q3  Due next annual  Mammo slip given      RTO one year for yearly exam or sooner as needed  S:  47 y o  female here for yearly exam  She is postmenopausal and has had no vaginal bleeding  She denies vaginal discharge, itching, odor or dryness  Sexual activity: She is sexually active without pain, bleeding or dryness  STD testing: She does not want STD testing today  Menopausal  Last Pap: 11/2020 NILM/HPV -   - distant history LEEP  She is concerned about the possibility of any cancer, considering removal of cervix  Last Mammo: 12/2022 diagnostic BIRADS1  Last Colonoscopy: 1/2021 incomplete and scheduled for follow up    Non-smoker, social drinker, medical marijuana   Exercises irregularly    Family hx of breast cancer: Mat   Aunt x2  Family hx of ovarian cancer: denies  Family hx of colon cancer: denies       Current Outpatient Medications:   •  acyclovir (ZOVIRAX) 400 MG tablet, ONE TABLET TWICE A DAY, Disp: 60 tablet, Rfl: 0  •  acyclovir (ZOVIRAX) 5 % ointment, Apply topically 4 (four) times a day As needed, Disp: 30 g, Rfl: 0  •  amLODIPine (NORVASC) 5 mg tablet, Take 5 mg by mouth daily, Disp: , Rfl: 0  •  atorvastatin (LIPITOR) 20 mg tablet, Take 1 tablet (20 mg total) by mouth daily, Disp: 90 tablet, Rfl: 3  •  clonazePAM (KlonoPIN) 1 mg tablet, Take 1 mg by mouth 3 (three) times a day, Disp: , Rfl: 0  •  methocarbamol (ROBAXIN) 500 mg tablet, Take 1 tablet (500 mg total) by mouth 2 (two) times a day, Disp: 20 tablet, Rfl: 0  •  NON FORMULARY, Medical Marijuana, Disp: , Rfl:   •  traZODone (DESYREL) 100 mg tablet, Take 50 mg by mouth daily at bedtime as needed  , Disp: , Rfl:   •  estradiol (Yuvafem) 10 MCG TABS vaginal tablet, PLACE 1 TABLET IN THE VAGINA TWICE WEEKLY (Patient not taking: Reported on 2/7/2023), Disp: 24 tablet, Rfl: 0  Social History     Socioeconomic History   • Marital status: /Civil Union     Spouse name: Not on file   • Number of children: Not on file   • Years of education: Not on file   • Highest education level: Not on file   Occupational History   • Not on file   Tobacco Use   • Smoking status: Never   • Smokeless tobacco: Never   Vaping Use   • Vaping Use: Never used   Substance and Sexual Activity   • Alcohol use:  Yes     Alcohol/week: 1 0 standard drink     Types: 1 Glasses of wine per week   • Drug use: Yes     Types: Marijuana     Comment: Medical Marijuana Card   • Sexual activity: Yes     Partners: Male     Birth control/protection: Surgical, Female Sterilization   Other Topics Concern   • Not on file   Social History Narrative   • Not on file     Social Determinants of Health     Financial Resource Strain: Not on file   Food Insecurity: Not on file   Transportation Needs: Not on file   Physical Activity: Not on file   Stress: No Stress Concern Present   • Feeling of Stress : Not at all   Social Connections: Not on file   Intimate Partner Violence: Not on file   Housing Stability: Not on file     Family History   Problem Relation Age of Onset   • Diabetes Mother    • Lung cancer Father         non smoker   • Colon polyps Father    • Cancer Father    • Pancreatic cancer Maternal Grandmother    • Heart disease Paternal Grandmother    • Hypertension Paternal Grandmother    • Breast cancer Maternal Aunt 39   • Uterine cancer Maternal Aunt    • Breast cancer Maternal Aunt 46   • Stroke Sister    • No Known Problems Brother    • Other Sister         Hydrocephalus   • Colon cancer Neg Hx    • Ovarian cancer Neg Hx    • Cervical cancer Neg Hx      Past Medical History:   Diagnosis Date   • Abnormal Pap smear of cervix    • Acute low back pain 09/30/2021   • Anxiety    • Bradycardia    • Depression    • Gastric ulcer    • Hernia 2009   • Hypertension 2006   • Lactose intolerance 2003   • MDD (major depressive disorder)    • PTSD (post-traumatic stress disorder)         Review of Systems   Respiratory: Negative  Cardiovascular: Negative  Gastrointestinal: Negative for constipation and diarrhea  Genitourinary: Negative for difficulty urinating, pelvic pain, vaginal bleeding, vaginal discharge, itching or odor  O:  Blood pressure 118/70, height 5' 6" (1 676 m), weight 64 9 kg (143 lb), not currently breastfeeding  Patient appears well and is not in distress  Neck is supple without masses  Breasts are symmetrical without mass, tenderness, nipple discharge, skin changes or adenopathy  Abdomen is soft and nontender without masses  Vulva without lesions or rashes  Urethral meatus and urethra are normal  Bladder is normal to palpation  Vagina is normal without discharge or bleeding  Cervix is normal without discharge or lesion  Uterus surgically absent  Adnexa are normal, mobile, nontender without palpable mass    Rectovaginal exam without nodularity/masses/visible blood on glove

## 2023-02-09 ENCOUNTER — EVALUATION (OUTPATIENT)
Dept: PHYSICAL THERAPY | Facility: CLINIC | Age: 53
End: 2023-02-09

## 2023-02-09 DIAGNOSIS — M54.2 NECK PAIN: Primary | ICD-10-CM

## 2023-02-09 NOTE — PROGRESS NOTES
PT Evaluation     Today's date: 2023  Patient name: Venita Rodrigues  : 1970  MRN: [id number]  Referring provider: Mirella Chao MD  Dx:   Encounter Diagnosis     ICD-10-CM    1  Neck pain  M54 2 Ambulatory Referral to Physical Therapy                     Assessment  Assessment details: Patient was provided a home exercise program and demonstrated an understanding of exercises  Patient was advised to stop performing home exercise program if symptoms increase or new complaints developed  Verbal understanding demonstrated regarding home exercise program instructions  Patient would benefit from skilled physical therapy services for prescribed exercises, manual interventions, neuromuscular re-education, education, and modalities as deemed appropriate to assist patient in achieving their maximum level of function  Patient presents with exacerbation of cervical pain approximately 2 weeks ago ( has history of neck pain 2 years ago )   Evaluation reveals:  Loss of cervical arom, (+) muscle spasm/ trigger points L > R UT/ Scapular border, mild weakness left UE, guarded posturing   Patient reported occasional dizziness with positional changes ( VAT (-) )     She presents motivated to reduce/ abolish her pain / symptoms and to return to full functional movement and activities  Impairments: abnormal or restricted ROM, activity intolerance, impaired physical strength, lacks appropriate home exercise program, pain with function and poor posture   Understanding of Dx/Px/POC: good  Goals  STG   1  Patient will demonstrate independence and competence with HEP 2 -4 weeks  2  Patient will report > 25-50% reduced pain 2-4 weeks    LTG   1  Patient will report improvements with both functional and recreational abilities  4-6 weeks  2  Patient will demonstrate improved motor function  4-6 weeks  3   Improved postural awareness and self correction  4-8 weeks  4    Restoration of full/ painfree cervical AROM all planes  4-6 weeks  Plan  Plan details: Patient response to treatment will be monitored each session and progressed accordingly    Thank you for this referral    Patient would benefit from: skilled physical therapy  Planned modality interventions: thermotherapy: hydrocollator packs  Other planned modality interventions: IASTM, Kinesiotape   Planned therapy interventions: IADL retraining, joint mobilization, manual therapy, patient education, postural training, strengthening, stretching, therapeutic exercise, flexibility, home exercise program and neuromuscular re-education  Frequency: 2x week  Duration in weeks: 8  Treatment plan discussed with: patient        Subjective Evaluation    History of Present Illness  Mechanism of injury: C/o bilateral neck pain with referred pain into left scapular region with left ue radicular pain reported to distal humerus    (+) p/n "in same region"     She has stopped sleeping with any pillows  She has started taking muscle relaxors  She reports that she has this pain a couple of years ago for which she sought PT   2 weeks her symptoms flared up and she went to primary MD  She feels weakness in left UE  Pain  Current pain ratin  At worst pain ratin  Quality: throbbing and radiating (piercing pain)  Relieving factors: medications, change in position and heat  Progression: improved (w/ medication )    Social Support  Lives with: adult children and spouse    Working: retired    Hand dominance: right    Patient Goals  Patient goals for therapy: decreased pain, increased strength and return to sport/leisure activities  Patient goal: learn exercises, get off the pain medication          Objective     Concurrent Complaints  Positive for disturbed sleep, dizziness (intermittently with positional changes - hx of vertigo) and headaches (reports occasional ha)   Negative for night pain    Postural Observations  Seated posture: fair  Standing posture: fair  Correction of posture: has no consistent effect    Additional Postural Observation Details  Patient presents with guarding of posture, flattened t-spine/ lordosis, min shoulder protraction/ min fhp demonstrated  ( instructed in sleep option with 1 pillow - rolling up small handtowel and placing into bottom on one end )     Palpation   Left   Muscle spasm in the levator scapulae and upper trapezius  Tenderness of the cervical paraspinals, middle trapezius, scalenes and suboccipitals  Trigger point to rhomboids  Right   Muscle spasm in the upper trapezius  Tenderness of the cervical paraspinals, levator scapulae, middle trapezius and suboccipitals  Tenderness   Cervical Spine   Tenderness in the spinous process  No tenderness in the left transverse process and right transverse process       Neurological Testing     Sensation   Cervical/Thoracic   Left   Intact: light touch    Right   Intact: light touch    Active Range of Motion   Cervical/Thoracic Spine       Cervical  Subcranial protraction:  with pain   Restriction level: minimal  Subcranial retraction:   Restriction level: minimal  Flexion:  with pain Restriction level: moderate  Extension:  with pain Restriction level: minimal  Left lateral flexion:  with pain Restriction level: minimal  Right lateral flexion:  Restriction level minimal  Left rotation:  with pain Restriction level: moderate  Right rotation:  Restriction level: minimal    Joint Play     Hypomobile: C2, C3 and C4     Pain: C2, C3 and C4   Mechanical Assessment    Cervical    Seated Protrusion: repeated movements   Pain location: no change  Pain level: increased  Seated retraction: repeated movements   Pain location: no change  Seated Flexion:  repeated movements  Pain intensity: worse  Pain level: increased  Seated Extension: repeated movements  Pain location: no change  Pain level: increased  Seated Left Sidebend: repeated movements   Pain location:no change  Pain level: increased  Seated right sidebend: repeated movements  Pain location: no change  Seated left rotation: repeated movements  Pain location: no change  Pain level: increased  Seated right rotation: repeated movements  Pain location: no change    Thoracic      Lumbar      Strength/Myotome Testing   Cervical Spine     Right   Normal strength    Left Shoulder     Planes of Motion   Flexion: 4+   Extension: 5   Abduction: 4+   Adduction: 5     Left Elbow   Flexion: 4+  Extension: 4+    Tests   Cervical   Negative vertical compression and VBI  Left   Negative Spurling's Test A  Right   Negative Spurling's Test A  Lumbar   Negative vertical compression  Neuro Exam:     Headaches   Patient reports headaches: Yes (reports occasional ha)  Precautions: neck pain    [facility]  Access Code: [email or web address]        Manuals 2/9            FOTO db                         cerv PA, uPA mobs Grade ii, iii db            cerv man traction db            Trigger point scap borders prone db                                      kinesiotape bilat UT "I" strips                                      Neuro Re-Ed                          Back rolls/ scap retract 20x ea            cerv retract 10 x 2 2s            Supine cerv retract                                                    Ther Ex             UBE             pect stretch                           Prone 45, 90 x 2                                                                  Ther Activity                                       Gait Training                                       Modalities             mhp bilateral UT  seated 10 min             Mechanical cerv traction   18# to start , 3 steps  static

## 2023-02-14 ENCOUNTER — OFFICE VISIT (OUTPATIENT)
Dept: PHYSICAL THERAPY | Facility: CLINIC | Age: 53
End: 2023-02-14

## 2023-02-14 DIAGNOSIS — M54.2 NECK PAIN: Primary | ICD-10-CM

## 2023-02-14 NOTE — PROGRESS NOTES
Daily Note     Today's date: 2023  Patient name: Patel Diop  : 1970  MRN: [id number]  Referring provider: Alec Antunez MD  Dx:   Encounter Diagnosis     ICD-10-CM    1  Neck pain  M54 2                      Subjective: Patient reports improved mobility following IE  She notes she was unsure if the tape was helping but noticed it was once it came off  Objective: See treatment diary below      Assessment: Initiated POC with good tolerance  Focus on postural strengthening  She is fatigued with scapular re-ed and postural exercises but no pain or discomfort reported  Tactile cues provided for proper muscle activation with good carryover  HEP updated and reviewed, patient gave verbal understanding  She did not feel a noticeable pull from traction  Patient demonstrated fatigue post treatment and would benefit from continued PT      Plan: Continue per plan of care  Precautions: neck pain    [facility]  Access Code: [email or web address]        Manuals            FOTO db                         cerv PA, uPA mobs Grade ii, iii db            cerv man traction db            Trigger point scap borders prone db                                      kinesiotape bilat UT "I" strips "I" strips                                     Neuro Re-Ed                          Back rolls/ scap retract 20x ea 20x ea           cerv retract 10 x 2 2s 20x            Supine cerv retract  20x   W/ pillow           Ulnar NG             Bilateral ER  NV?                         Ther Ex             UBE  5' retro           pect stretch   10"x10 in doorway           Rows/Ext   2x10 ea RTB           Prone 45, 90 x 2   Prone scap retract/+lift off 20x ea           Upper trap/Levator stretch             Seated Thoracic ext                                        Ther Activity                                       Gait Training                                       Modalities             mhp bilateral UT  seated 10 min Mechanical cerv traction   18# to start , 3 steps  static  18#   10 min

## 2023-02-16 ENCOUNTER — APPOINTMENT (OUTPATIENT)
Dept: PHYSICAL THERAPY | Facility: CLINIC | Age: 53
End: 2023-02-16

## 2023-02-20 ENCOUNTER — OFFICE VISIT (OUTPATIENT)
Dept: PHYSICAL THERAPY | Facility: CLINIC | Age: 53
End: 2023-02-20

## 2023-02-20 DIAGNOSIS — M54.2 NECK PAIN: Primary | ICD-10-CM

## 2023-02-22 ENCOUNTER — OFFICE VISIT (OUTPATIENT)
Dept: PHYSICAL THERAPY | Facility: CLINIC | Age: 53
End: 2023-02-22

## 2023-02-22 DIAGNOSIS — M54.2 NECK PAIN: Primary | ICD-10-CM

## 2023-02-22 NOTE — PROGRESS NOTES
Daily Note     Today's date: 2023  Patient name: Stephan Augustin  : 1970  MRN: [id number]  Referring provider: Emeterio Das MD  Dx:   Encounter Diagnosis     ICD-10-CM    1  Neck pain  M54 2           Start Time: 904          Subjective: Patient reported moderate relief of cervical symptoms since initiation of therapy; patient reported compliance with current HEP  Objective: See treatment diary below      Assessment: Tolerated treatment exhibiting minimal tissue restriction of rhomboid, trapezius or paracervical regions  Patient demonstrated good understanding of today's progression, Patient exhibited good technique with therapeutic exercises and would benefit from continued PT      Plan: Continue per plan of care  Progress treatment as tolerated  Precautions: neck pain    [facility] [employer]  Access Code: [email or web address]        Manuals          FOTO db   LA                        cerv PA, uPA mobs Grade ii, iii db  db          cerv man traction db  db LA         Trigger point scap borders prone db  db LA                                   kinesiotape bilat UT "I" strips "I" strips db LA                                   Neuro Re-Ed                          Back rolls/ scap retract 20x ea 20x ea 20x ea 20x each         cerv retract 10 x 2 2s 20x  2s x 20  :02  20x         Supine cerv retract  20x   W/ pillow 20x 20x w/ towel roll         Ulnar NG             Bilateral ER  NV?                         Ther Ex             UBE  5' retro 5'  5'  retro         pect stretch   10"x10 in doorway 20s x 5  :20  5x         Rows/Ext   2x10 ea RTB gtb x 20 ea  GTB  20x each         Prone 45, 90 x 2   Prone scap retract/+lift off 20x ea 20x ea 20x          Upper trap/Levator stretch    :20  3x each         Seated Thoracic ext                                        Ther Activity                                       Gait Training                                       Modalities mhp bilateral UT  seated 10 min             Mechanical cerv traction   18# to start , 3 steps  static  18#   10 min  DC

## 2023-03-01 ENCOUNTER — OFFICE VISIT (OUTPATIENT)
Dept: DERMATOLOGY | Facility: CLINIC | Age: 53
End: 2023-03-01

## 2023-03-01 VITALS — HEIGHT: 66 IN | BODY MASS INDEX: 22.82 KG/M2 | WEIGHT: 142 LBS

## 2023-03-01 DIAGNOSIS — L98.9 SKIN LESIONS: ICD-10-CM

## 2023-03-01 DIAGNOSIS — Z13.89 SCREENING FOR SKIN CONDITION: ICD-10-CM

## 2023-03-01 DIAGNOSIS — H02.63 XANTHELASMA OF EYELID, BILATERAL: Primary | ICD-10-CM

## 2023-03-01 DIAGNOSIS — H02.66 XANTHELASMA OF EYELID, BILATERAL: Primary | ICD-10-CM

## 2023-03-01 DIAGNOSIS — D23.72 DERMATOFIBROMA OF LOWER EXTREMITY, LEFT: ICD-10-CM

## 2023-03-01 NOTE — PATIENT INSTRUCTIONS
Xanthelasma probably related to her mixed hyperlipidemia patient advised importance of getting that under control to prevent this from getting worse if patient desires we will set up an appointment with Dr Sherif Nicole for treatment        A dermatofibroma is a common benign fibrous nodule that most often arises on the skin of the lower legs  A dermatofibroma is also called a "cutaneous fibrous histiocytoma "  Dermatofibromas occur at all ages and in people of every ethnicity  They are more common in women than in men  It is not clear if dermatofibroma is a reactive process or if it is a neoplasm  The lesions are made up of proliferating fibroblasts  Histiocytes may also be involved  They are sometimes attributed to an insect bite or ingrownhair or local trauma, but not consistently  They may be more numerous in patients with altered immunity  Dermatofibromas most often occur on the legs and arms, but may also arise on the trunk or any site of the body  Typical clinical features include the following:  People may have 1 or up to 15 lesions  Size varies from 0 5-1 5 cm diameter; most lesions are 7-10 mm diameter  They are firm nodules tethered to the skin surface and mobile over subcutaneous tissue  The skin "dimples" on pinching the lesion  Color may be pink to light brown in white skin, and dark brown to black in dark skin; some appear paler in the center  They do not usually cause symptoms, but they are sometimes painful or itchy  Because they are often raised lesions, they may be traumatized, for example by a razor  Occasionally dozens may erupt within a few months, usually in the setting of immunosuppression (for example autoimmune disease, cancer or certain medications)  Dermatofibroma does not give rise to cancer  However, occasionally, it may be mistaken for dermatofibrosarcoma or desmoplastic melanoma  A dermatofibroma is harmless and seldom causes any symptoms   Usually, only reassurance is needed  If it is nuisance or causing concern, the lesion can be removed surgically, resulting in a scar that is, by definition, usually longer in diameter than the widest portion of the dermatofibroma  Cryotherapy, shave biopsy and laser surgery are rarely completely successful  Skin punch biopsy or incisional biopsy may be undertaken if there is an atypical feature such as recent enlargement, ulceration, or asymmetrical structures and colours on dermatoscopy    Screening for Dermatologic Disorders: Nothing else of concern noted on complete exam follow up prn

## 2023-03-01 NOTE — PROGRESS NOTES
[facility] DERMATOLOGY  [address] [place] [address] [facility] [address]  [phone]  [phone]     MRN: [id number] : 1970  Encounter: [id number]  Patient Information: Rios Velasco  Chief complaint: skin tags    History of present illness: 50-year-old female presents secondary to concerns regarding deposits noted near her eye with none history of hypercholesterolemia  Past Medical History:   Diagnosis Date   • Abnormal Pap smear of cervix    • Acute low back pain 2021   • Anxiety    • Bradycardia    • Depression    • Gastric ulcer    • Hernia    • Hypertension    • Lactose intolerance    • MDD (major depressive disorder)    • PTSD (post-traumatic stress disorder)      Past Surgical History:   Procedure Laterality Date   • BREAST BIOPSY     • BREAST CYST EXCISION Left     benign   • CERVICAL BIOPSY  W/ LOOP ELECTRODE EXCISION     •  SECTION     • HYSTERECTOMY  2008    Partial   • OOPHORECTOMY Bilateral 2015   • PARTIAL HYSTERECTOMY  2014    supracervical hysterectomy     Social History   Social History     Substance and Sexual Activity   Alcohol Use Yes   • Alcohol/week: 1 0 standard drink   • Types: 1 Glasses of wine per week     Social History     Substance and Sexual Activity   Drug Use Yes   • Types: Marijuana    Comment: Medical Marijuana Card     Social History     Tobacco Use   Smoking Status Never   Smokeless Tobacco Never     Family History   Problem Relation Age of Onset   • Diabetes Mother    • Lung cancer Father         non smoker   • Colon polyps Father    • Cancer Father    • Pancreatic cancer Maternal Grandmother    • Heart disease Paternal Grandmother    • Hypertension Paternal Grandmother    • Breast cancer Maternal Aunt 39   • Uterine cancer Maternal Aunt    • Breast cancer Maternal Aunt 46   • Stroke Sister    • No Known Problems Brother    • Other Sister         Hydrocephalus   • Colon cancer Neg Hx    • Ovarian cancer Neg Hx    • Cervical cancer Neg Hx      Meds/Allergies   Allergies   Allergen Reactions   • Lisinopril Angioedema       Meds:  Prior to Admission medications    Medication Sig Start Date End Date Taking?  Authorizing Provider   acyclovir (ZOVIRAX) 400 MG tablet ONE TABLET TWICE A DAY 12/7/22 12/7/23 Yes LEIDA Jenkins   acyclovir (ZOVIRAX) 5 % ointment Apply topically 4 (four) times a day As needed 12/7/22  Yes LEIDA Jenkins   amLODIPine (NORVASC) 5 mg tablet Take 5 mg by mouth daily 3/20/18  Yes Historical Provider, MD   atorvastatin (LIPITOR) 20 mg tablet Take 1 tablet (20 mg total) by mouth daily 1/30/23  Yes Shivani Arredondo MD   clonazePAM (KlonoPIN) 1 mg tablet Take 1 mg by mouth 3 (three) times a day 2/19/18  Yes Historical Provider, MD   methocarbamol (ROBAXIN) 500 mg tablet Take 1 tablet (500 mg total) by mouth 2 (two) times a day 1/30/23  Yes Shivani Arredondo MD   NON FORMULARY Medical Marijuana   Yes Historical Provider, MD   traZODone (DESYREL) 100 mg tablet Take 50 mg by mouth daily at bedtime as needed     Yes Historical Provider, MD   estradiol (Yuvafem) 10 MCG TABS vaginal tablet PLACE 1 TABLET IN THE VAGINA TWICE WEEKLY  Patient not taking: Reported on 2/7/2023 1/5/22   Audi Roberts MD       Subjective:     Review of Systems:    General: negative for - chills, fatigue, fever,  weight gain or weight loss  Psychological: negative for - anxiety, behavioral disorder, concentration difficulties, decreased libido, depression, irritability, memory difficulties, mood swings, sleep disturbances or suicidal ideation  ENT: negative for - hearing difficulties , nasal congestion, nasal discharge, oral lesions, sinus pain, sneezing, sore throat  Allergy and Immunology: negative for - hives, insect bite sensitivity,  Hematological and Lymphatic: negative for - bleeding problems, blood clots,bruising, swollen lymph nodes  Endocrine: negative for - hair pattern changes, hot flashes, malaise/lethargy, mood swings, palpitations, polydipsia/polyuria, skin changes, temperature intolerance or unexpected weight change  Respiratory: negative for - cough, hemoptysis, orthopnea, shortness of breath, or wheezing  Cardiovascular: negative for - chest pain, dyspnea on exertion, edema,  Gastrointestinal: negative for - abdominal pain, nausea/vomiting  Genito-Urinary: negative for - dysuria, incontinence, irregular/heavy menses or urinary frequency/urgency  Musculoskeletal: negative for - gait disturbance, joint pain, joint stiffness, joint swelling, muscle pain, muscular weakness  Dermatological:  As in HPI  Neurological: negative for confusion, dizziness, headaches, impaired coordination/balance, memory loss, numbness/tingling, seizures, speech problems, tremors or weakness       Objective:   Ht 5' 6" (1 676 m)   Wt 64 4 kg (142 lb)   BMI 22 92 kg/m²     Physical Exam:    General Appearance:    Alert, cooperative, no distress   Head:    Normocephalic, without obvious abnormality, atraumatic           Skin:   A full skin exam was performed including scalp, head scalp, eyes, ears, nose, lips, neck, chest, axilla, abdomen, back, buttocks, bilateral upper extremities, bilateral lower extremities, hands, feet, fingers, toes, fingernails, and toenails yellowish papules noted at the medial canthus of both eyes in addition patient has a dome-shaped papule of concern noted on the left knee nothing else of concern noted on complete exam     Assessment:     1  Xanthelasma of eyelid, bilateral        2  Skin lesions  Ambulatory Referral to Dermatology      3  Dermatofibroma of lower extremity, left        4   Screening for skin condition              Plan:   Xanthelasma probably related to her mixed hyperlipidemia patient advised importance of getting that under control to prevent this from getting worse if patient desires we will set up an appointment with Dr Jorge Sims for treatment  •       A dermatofibroma is a common benign fibrous nodule that most often arises on the skin of the lower legs  A dermatofibroma is also called a "cutaneous fibrous histiocytoma "  Dermatofibromas occur at all ages and in people of every ethnicity  They are more common in women than in men  It is not clear if dermatofibroma is a reactive process or if it is a neoplasm  The lesions are made up of proliferating fibroblasts  Histiocytes may also be involved  They are sometimes attributed to an insect bite or ingrownhair or local trauma, but not consistently  They may be more numerous in patients with altered immunity  Dermatofibromas most often occur on the legs and arms, but may also arise on the trunk or any site of the body  Typical clinical features include the following:  • People may have 1 or up to 15 lesions  • Size varies from 0 5-1 5 cm diameter; most lesions are 7-10 mm diameter  • They are firm nodules tethered to the skin surface and mobile over subcutaneous tissue  • The skin "dimples" on pinching the lesion  • Color may be pink to light brown in white skin, and dark brown to black in dark skin; some appear paler in the center  • They do not usually cause symptoms, but they are sometimes painful or itchy  • Because they are often raised lesions, they may be traumatized, for example by a razor  • Occasionally dozens may erupt within a few months, usually in the setting of immunosuppression (for example autoimmune disease, cancer or certain medications)  • Dermatofibroma does not give rise to cancer  However, occasionally, it may be mistaken for dermatofibrosarcoma or desmoplastic melanoma  A dermatofibroma is harmless and seldom causes any symptoms  Usually, only reassurance is needed  If it is nuisance or causing concern, the lesion can be removed surgically, resulting in a scar that is, by definition, usually longer in diameter than the widest portion of the dermatofibroma    Cryotherapy, shave biopsy and laser surgery are rarely completely successful  Skin punch biopsy or incisional biopsy may be undertaken if there is an atypical feature such as recent enlargement, ulceration, or asymmetrical structures and colours on dermatoscopy  Screening for Dermatologic Disorders: Nothing else of concern noted on complete exam follow up kavitha Chi MD  4/4/4507,1:47 PM    Portions of the record may have been created with voice recognition software   Occasional wrong word or "sound a like" substitutions may have occurred due to the inherent limitations of voice recognition software   Read the chart carefully and recognize, using context, where substitutions have occurred

## 2023-03-01 NOTE — PROGRESS NOTES
[facility] Note     Patient Name: Demetria Barthel  Encounter Date: March 1, 2023     Have you been cared for by a [place] [address] Dermatologist in the last 3 years and, if so, which description applies to you? NO  I am considered a "new" patient and must complete all patient intake questions  I am FEMALE/of child-bearing potential     REVIEW OF SYSTEMS:  Have you recently had or currently have any of the following? · Recent fever or chills? No  · Any non-healing wound? No  · Are you pregnant or planning to become pregnant? No  · Are you currently or planning to be nursing or breast feeding? No   PAST MEDICAL HISTORY:  Have you personally ever had or currently have any of the following? If "YES," then please provide more detail  · Skin cancer (such as Melanoma, Basal Cell Carcinoma, Squamous Cell Carcinoma? No  · Tuberculosis, HIV/AIDS, Hepatitis B or C: No  · Systemic Immunosuppression such as Diabetes, Biologic or Immunotherapy, Chemotherapy, Organ Transplantation, Bone Marrow Transplantation No  · Radiation Treatment No   FAMILY HISTORY:  Any "first degree relatives" (parent, brother, sister, or child) with the following? • Skin Cancer, Pancreatic or Other Cancer? YES, Grandmother -  Pancreatic Cancer, Father - Lung Cancer, Aunts - Breast Cancer   PATIENT EXPERIENCE:    • Do you want the Dermatologist to perform a COMPLETE skin exam today including a clinical examination under the "bra and underwear" areas? Yes  • If necessary, do we have your permission to call and leave a detailed message on your Preferred Phone number that includes your specific medical information?   Yes      Allergies   Allergen Reactions   • Lisinopril Angioedema      Current Outpatient Medications:   •  acyclovir (ZOVIRAX) 400 MG tablet, ONE TABLET TWICE A DAY, Disp: 60 tablet, Rfl: 0  •  acyclovir (ZOVIRAX) 5 % ointment, Apply topically 4 (four) times a day As needed, Disp: 30 g, Rfl: 0  •  amLODIPine (NORVASC) 5 mg tablet, Take 5 mg by mouth daily, Disp: , Rfl: 0  •  atorvastatin (LIPITOR) 20 mg tablet, Take 1 tablet (20 mg total) by mouth daily, Disp: 90 tablet, Rfl: 3  •  clonazePAM (KlonoPIN) 1 mg tablet, Take 1 mg by mouth 3 (three) times a day, Disp: , Rfl: 0  •  methocarbamol (ROBAXIN) 500 mg tablet, Take 1 tablet (500 mg total) by mouth 2 (two) times a day, Disp: 20 tablet, Rfl: 0  •  NON FORMULARY, Medical Marijuana, Disp: , Rfl:   •  traZODone (DESYREL) 100 mg tablet, Take 50 mg by mouth daily at bedtime as needed  , Disp: , Rfl:   •  estradiol (Yuvafem) 10 MCG TABS vaginal tablet, PLACE 1 TABLET IN THE VAGINA TWICE WEEKLY (Patient not taking: Reported on 2/7/2023), Disp: 24 tablet, Rfl: 0          • Whom besides the patient is providing clinical information about today's encounter?   o NO ADDITIONAL HISTORIAN (patient alone provided history)    Physical Exam and Assessment/Plan by Diagnosis:

## 2023-03-02 ENCOUNTER — OFFICE VISIT (OUTPATIENT)
Dept: PHYSICAL THERAPY | Facility: CLINIC | Age: 53
End: 2023-03-02

## 2023-03-02 DIAGNOSIS — M54.2 NECK PAIN: Primary | ICD-10-CM

## 2023-03-02 NOTE — PROGRESS NOTES
Daily Note     Today's date: 3/2/2023  Patient name: Alma Bolaños  : 1970  MRN: [id number]  Referring provider: Dane Galvez MD  Dx:   Encounter Diagnosis     ICD-10-CM    1  Neck pain  M54 2                      Subjective: Initially patient noted significant difference without taping when she [place]  Patient reported increased cervical discomfort upon presentation today  Objective: See treatment diary below      Assessment: Tolerated treatment with modifications of therapeutic activity to tolerance with good response; encouraged patient to work to tolerance when performing HEP to avoid aggravation of symptoms  Patient demonstrated fatigue post treatment, exhibited good technique with therapeutic exercises and would benefit from continued PT to improve strength and stability  Plan: Continue per plan of care  Progress treatment as tolerated  Precautions: neck pain    [facility]  Access Code: [email or web address]        Manuals 2/9 2/14 2/20 2/22 3/2        FOTO db   LA                        cerv PA, uPA mobs Grade ii, iii db  db          cerv man traction db  db LA LA        Trigger point scap borders prone db  db LA LA                                  kinesiotape bilat UT "I" strips "I" strips db LA LA                                  Neuro Re-Ed                          Back rolls/ scap retract 20x ea 20x ea 20x ea 20x each 20x each        cerv retract 10 x 2 2s 20x  2s x 20  :02  20x :02  20x        Supine cerv retract  20x   W/ pillow 20x 20x w/ towel roll 20x w/ towel roll        Ulnar NG             Bilateral ER  NV?                         Ther Ex             UBE  5' retro 5'  5'  retro 5' retro        pect stretch   10"x10 in doorway 20s x 5  :20  5x :20  5x        Rows/Ext   2x10 ea RTB gtb x 20 ea  GTB  20x each held        Prone 45, 90 x 2   Prone scap retract/+lift off 20x ea 20x ea 20x  20x each        Upper trap/Levator stretch :20  3x each :20  3x each        Seated Thoracic ext                                        Ther Activity                                       Gait Training                                       Modalities             mhp bilateral UT  seated 10 min     Seated  10'        Mechanical cerv traction   18# to start , 3 steps  static  18#   10 min  DC

## 2023-03-07 ENCOUNTER — APPOINTMENT (OUTPATIENT)
Dept: PHYSICAL THERAPY | Facility: CLINIC | Age: 53
End: 2023-03-07

## 2023-03-08 ENCOUNTER — HOSPITAL ENCOUNTER (OUTPATIENT)
Dept: GASTROENTEROLOGY | Facility: HOSPITAL | Age: 53
Setting detail: OUTPATIENT SURGERY
Discharge: HOME | End: 2023-03-08

## 2023-03-08 ENCOUNTER — ANESTHESIA (OUTPATIENT)
Dept: GASTROENTEROLOGY | Facility: HOSPITAL | Age: 53
End: 2023-03-08

## 2023-03-08 ENCOUNTER — ANESTHESIA EVENT (OUTPATIENT)
Dept: GASTROENTEROLOGY | Facility: HOSPITAL | Age: 53
End: 2023-03-08

## 2023-03-08 VITALS
DIASTOLIC BLOOD PRESSURE: 80 MMHG | OXYGEN SATURATION: 100 % | HEART RATE: 53 BPM | TEMPERATURE: 97.5 F | WEIGHT: 140.87 LBS | RESPIRATION RATE: 18 BRPM | HEIGHT: 66 IN | SYSTOLIC BLOOD PRESSURE: 123 MMHG | BODY MASS INDEX: 22.64 KG/M2

## 2023-03-08 DIAGNOSIS — Z12.11 SCREENING FOR COLORECTAL CANCER: ICD-10-CM

## 2023-03-08 DIAGNOSIS — Z86.010 HISTORY OF COLON POLYPS: ICD-10-CM

## 2023-03-08 DIAGNOSIS — Z12.12 SCREENING FOR COLORECTAL CANCER: ICD-10-CM

## 2023-03-08 RX ORDER — SODIUM CHLORIDE, SODIUM LACTATE, POTASSIUM CHLORIDE, CALCIUM CHLORIDE 600; 310; 30; 20 MG/100ML; MG/100ML; MG/100ML; MG/100ML
125 INJECTION, SOLUTION INTRAVENOUS CONTINUOUS
Status: DISCONTINUED | OUTPATIENT
Start: 2023-03-08 | End: 2023-03-12 | Stop reason: HOSPADM

## 2023-03-08 RX ORDER — PROPOFOL 10 MG/ML
INJECTION, EMULSION INTRAVENOUS AS NEEDED
Status: DISCONTINUED | OUTPATIENT
Start: 2023-03-08 | End: 2023-03-08

## 2023-03-08 RX ORDER — SODIUM CHLORIDE, SODIUM LACTATE, POTASSIUM CHLORIDE, CALCIUM CHLORIDE 600; 310; 30; 20 MG/100ML; MG/100ML; MG/100ML; MG/100ML
125 INJECTION, SOLUTION INTRAVENOUS CONTINUOUS
Status: CANCELLED | OUTPATIENT
Start: 2023-03-08

## 2023-03-08 RX ADMIN — SODIUM CHLORIDE, SODIUM LACTATE, POTASSIUM CHLORIDE, AND CALCIUM CHLORIDE 125 ML/HR: .6; .31; .03; .02 INJECTION, SOLUTION INTRAVENOUS at 08:38

## 2023-03-08 RX ADMIN — PROPOFOL 30 MG: 10 INJECTION, EMULSION INTRAVENOUS at 09:42

## 2023-03-08 RX ADMIN — PROPOFOL 100 MG: 10 INJECTION, EMULSION INTRAVENOUS at 09:37

## 2023-03-08 RX ADMIN — PROPOFOL 30 MG: 10 INJECTION, EMULSION INTRAVENOUS at 09:39

## 2023-03-08 RX ADMIN — PROPOFOL 40 MG: 10 INJECTION, EMULSION INTRAVENOUS at 09:44

## 2023-03-08 NOTE — ANESTHESIA PREPROCEDURE EVALUATION
Procedure:  COLONOSCOPY    Relevant Problems   CARDIO   (+) Essential hypertension   (+) Mixed hyperlipidemia      NEURO/PSYCH   (+) Anxiety   (+) Depression, recurrent (HCC)   (+) PTSD (post-traumatic stress disorder)        Physical Exam    Airway    Mallampati score: I  TM Distance: >3 FB  Neck ROM: full     Dental   No notable dental hx     Cardiovascular      Pulmonary      Other Findings        Anesthesia Plan  ASA Score- 2     Anesthesia Type- IV sedation with anesthesia with ASA Monitors  Additional Monitors:   Airway Plan:           Plan Factors-Exercise tolerance (METS): >4 METS  Chart reviewed  Patient summary reviewed  Patient is a current smoker  Patient instructed to abstain from smoking on day of procedure  Patient did not smoke on day of surgery  There is medical exclusion for perioperative obstructive sleep apnea risk education  Induction- intravenous  Postoperative Plan-     Informed Consent- Anesthetic plan and risks discussed with patient  I personally reviewed this patient with the CRNA  Discussed and agreed on the Anesthesia Plan with the CRNA  Chau Randolph

## 2023-03-08 NOTE — ANESTHESIA POSTPROCEDURE EVALUATION
Post-Op Assessment Note    CV Status:  Stable    Pain management: adequate     Mental Status:  Sleepy   Hydration Status:  Euvolemic   PONV Controlled:  Controlled   Airway Patency:  Patent   Two or more mitigation strategies used for obstructive sleep apnea   Post Op Vitals Reviewed: Yes      Staff: CRNA         No notable events documented      BP   114/68   Temp   97 1   Pulse  58   Resp   13   SpO2   99

## 2023-03-08 NOTE — H&P
History and Physical - [facility]  Brandin Mendoza 47 y o  female MRN: [id number]                  HPI: Brandin Mendoza is a 47y o  year old female who presents for history of colon polyps  Last colonoscopy less than 3 years ago was a poor prep      REVIEW OF SYSTEMS: Per the HPI, and otherwise unremarkable      Historical Information   Past Medical History:   Diagnosis Date   • Abnormal Pap smear of cervix    • Acute low back pain 2021   • Anxiety    • Bradycardia    • Depression    • Gastric ulcer    • Hernia    • Hypertension    • Lactose intolerance    • MDD (major depressive disorder)    • PTSD (post-traumatic stress disorder)      Past Surgical History:   Procedure Laterality Date   • BREAST BIOPSY     • BREAST CYST EXCISION Left     benign   • CERVICAL BIOPSY  W/ LOOP ELECTRODE EXCISION     •  SECTION     • HYSTERECTOMY  2008    Partial   • OOPHORECTOMY Bilateral 2015   • PARTIAL HYSTERECTOMY  2014    supracervical hysterectomy     Social History   Social History     Substance and Sexual Activity   Alcohol Use Yes   • Alcohol/week: 1 0 standard drink   • Types: 1 Glasses of wine per week     Social History     Substance and Sexual Activity   Drug Use Yes   • Types: Marijuana    Comment: Medical Marijuana Card     Social History     Tobacco Use   Smoking Status Never   Smokeless Tobacco Never   Tobacco Comments    Medical marijuana     Family History   Problem Relation Age of Onset   • Diabetes Mother    • Lung cancer Father         non smoker   • Colon polyps Father    • Cancer Father    • Pancreatic cancer Maternal Grandmother    • Heart disease Paternal Grandmother    • Hypertension Paternal Grandmother    • Breast cancer Maternal Aunt 39   • Uterine cancer Maternal Aunt    • Breast cancer Maternal Aunt 46   • Stroke Sister    • No Known Problems Brother    • Other Sister         Hydrocephalus   • Colon cancer Neg Hx    • Ovarian cancer Neg Hx    • Cervical cancer Neg Hx        Meds/Allergies     (Not in a hospital admission)      Allergies   Allergen Reactions   • Lisinopril Angioedema       Objective     Blood pressure 109/76, pulse 69, temperature 97 6 °F (36 4 °C), temperature source Temporal, resp  rate 16, height 5' 6" (1 676 m), weight 63 9 kg (140 lb 14 oz), SpO2 99 %, not currently breastfeeding        PHYSICAL EXAM    Gen: NAD  CV: RRR  CHEST: Clear  ABD: soft, NT/ND  EXT: no edema  Neuro: AAO      ASSESSMENT/PLAN:  This is a 47y o  year old female here for history of colon polyps    PLAN:   Procedure: Colonoscopy

## 2023-03-09 ENCOUNTER — APPOINTMENT (OUTPATIENT)
Dept: PHYSICAL THERAPY | Facility: CLINIC | Age: 53
End: 2023-03-09

## 2023-03-13 ENCOUNTER — OFFICE VISIT (OUTPATIENT)
Dept: PHYSICAL THERAPY | Facility: CLINIC | Age: 53
End: 2023-03-13

## 2023-03-13 DIAGNOSIS — M54.2 NECK PAIN: Primary | ICD-10-CM

## 2023-03-13 NOTE — PROGRESS NOTES
Daily Note     Today's date: 3/13/2023  Patient name: Deepak Montiel  : 1970  MRN: [id number]  Referring provider: Juice Lopez MD  Dx:   Encounter Diagnosis     ICD-10-CM    1  Neck pain  M54 2                      Subjective: The patient reported waking up with a headache today relieved with HEP  She notes she falls asleep with a rolled up towel under her neck to promote good sleeping posture but often wakes up with a severe forward head and rounded shoulders prompting her headaches  She denies any neck pain today just soreness  Objective: See treatment diary below      Assessment: The patient required repeated cueing to limit UT compensation during postural strengthening  Moderate tone and trigger points noted along superior and inferior scapular border  Overall, good tolerance to exercise program without increased symptoms  Re-applied KT for UT inhibition and educated the patient on wear and removal        Plan: Continue per plan of care  Progress treatment as tolerated  Precautions: neck pain    [facility]  Access Code: [email or web address]        Manuals 2/9 2/14 2/20 2/22 3/2 3/13       FOTO db   LA                        cerv PA, uPA mobs Grade ii, iii db  db          cerv man traction db  db LA LA NB       Trigger point scap borders prone db  db LA LA NB                                 kinesiotape bilat UT "I" strips "I" strips db LA LA NB                                 Neuro Re-Ed                          Back rolls/ scap retract 20x ea 20x ea 20x ea 20x each 20x each 20x ea       cerv retract 10 x 2 2s 20x  2s x 20  :02  20x :02  20x :02 20x       Supine cerv retract  20x   W/ pillow 20x 20x w/ towel roll 20x w/ towel roll 20x w/ towel roll       Ulnar NG             Bilateral ER  NV?                         Ther Ex             UBE  5' retro 5'  5'  retro 5' retro 5' retro       pect stretch   10"x10 in doorway 20s x 5  :20  5x :20  5x :20 5x       Rows/Ext   2x10 ea RTB gtb x 20 ea  GTB  20x each held GTB 20x ea       Prone 45, 90 x 2   Prone scap retract/+lift off 20x ea 20x ea 20x  20x each 20x ea       Upper trap/Levator stretch    :20  3x each :20  3x each :20 3x ea       Seated Thoracic ext                                        Ther Activity                                       Gait Training                                       Modalities             mhp bilateral UT  seated 10 min     Seated  10'        Mechanical cerv traction   18# to start , 3 steps  static  18#   10 min  DC

## 2023-03-16 ENCOUNTER — OFFICE VISIT (OUTPATIENT)
Dept: PHYSICAL THERAPY | Facility: CLINIC | Age: 53
End: 2023-03-16

## 2023-03-16 DIAGNOSIS — M54.2 NECK PAIN: Primary | ICD-10-CM

## 2023-03-16 NOTE — PROGRESS NOTES
Daily Note     Today's date: 3/16/2023  Patient name: Bernarda Madrigal  : 1970  MRN: [id number]  Referring provider: Mal Moreira MD  Dx:   Encounter Diagnosis     ICD-10-CM    1  Neck pain  M54 2                      Subjective: The patient reports that she is doing well today with no complaints of pain today just soreness on both sides of her neck  Notes having a headache 2 days ago but went away following her HEP  Notes that the tape continues to help as well  Objective: See treatment diary below      Assessment: Patient tolerated treatment well today with no increased symptoms throughout  She did need cuing to ensure no UT compensation was present with scap retraction exercises  K tape was re applied at the end of session today  Plan: Continue per plan of care  Progress treatment as tolerated  Precautions: neck pain    [facility]  Access Code: [email or web address]        Manuals 2/9 2/14 2/20 2/22 3/2 3/13 3/16      FOTO db   LA                        cerv PA, uPA mobs Grade ii, iii db  db          cerv man traction db  db LA LA NB MM      Trigger point scap borders prone db  db LA LA NB MM                                kinesiotape bilat UT "I" strips "I" strips db LA LA NB MM                                Neuro Re-Ed                          Back rolls/ scap retract 20x ea 20x ea 20x ea 20x each 20x each 20x ea 20x ea      cerv retract 10 x 2 2s 20x  2s x 20  :02  20x :02  20x :02 20x :02 20x      Supine cerv retract  20x   W/ pillow 20x 20x w/ towel roll 20x w/ towel roll 20x w/ towel roll 20x w/ towel roll      Ulnar NG             Bilateral ER  NV?                         Ther Ex             UBE  5' retro 5'  5'  retro 5' retro 5' retro 5' retro      pect stretch   10"x10 in doorway 20s x 5  :20  5x :20  5x :20 5x :20 5x      Rows/Ext   2x10 ea RTB gtb x 20 ea  GTB  20x each held GTB 20x ea GTB 20x ea      Prone 45, 90 x 2   Prone scap retract/+lift off 20x ea 20x ea 20x  20x each 20x ea 20x ea      Upper trap/Levator stretch    :20  3x each :20  3x each :20 3x ea :20 3x ea      Seated Thoracic ext                                        Ther Activity                                       Gait Training                                       Modalities             mhp bilateral UT  seated 10 min     Seated  10'        Mechanical cerv traction   18# to start , 3 steps  static  18#   10 min  DC

## 2023-03-20 ENCOUNTER — OFFICE VISIT (OUTPATIENT)
Dept: PHYSICAL THERAPY | Facility: CLINIC | Age: 53
End: 2023-03-20

## 2023-03-20 DIAGNOSIS — M54.2 NECK PAIN: Primary | ICD-10-CM

## 2023-03-20 NOTE — PROGRESS NOTES
Daily Note     Today's date: 3/20/2023  Patient name: Diana Arshad  : 1970  MRN: [id number]  Referring provider: Hannah Samayoa MD  Dx:   Encounter Diagnosis     ICD-10-CM    1  Neck pain  M54 2                      Subjective: Patient reports she is doing well with less pain  Feels she may be getting close to ready for discharge  Objective: See treatment diary below      Assessment: Tolerated treatment well  Patient reports adequate challenge with strengthening exercises; may benefit from band progression for scap strengthening next visit  Manual and taping performed at end of treatment; patient reports feeling good at end of treatment session with no pain  Patient exhibited good technique with therapeutic exercises and would benefit from continued PT  Plan: Potential discharge next visit  Precautions: neck pain    [facility]  Access Code: [email or web address]        Manuals 2/9 2/14 2/20 2/22 3/2 3/13 3/16 3/20     FOTO db   LA                        cerv PA, uPA mobs Grade ii, iii db  db          cerv man traction db  db LA LA NB MM SD     Trigger point scap borders prone db  db LA LA NB MM SD                               kinesiotape bilat UT "I" strips "I" strips db LA LA NB MM SD                               Neuro Re-Ed                          Back rolls/ scap retract 20x ea 20x ea 20x ea 20x each 20x each 20x ea 20x ea 20x ea     cerv retract 10 x 2 2s 20x  2s x 20  :02  20x :02  20x :02 20x :02 20x 5s x 20     Supine cerv retract  20x   W/ pillow 20x 20x w/ towel roll 20x w/ towel roll 20x w/ towel roll 20x w/ towel roll      Ulnar NG             Bilateral ER  NV?                         Ther Ex             UBE  5' retro 5'  5'  retro 5' retro 5' retro 5' retro 5 min retro     pect stretch   10"x10 in doorway 20s x 5  :20  5x :20  5x :20 5x :20 5x 5x 20s     Rows/Ext   2x10 ea RTB gtb x 20 ea  GTB  20x each held GTB 20x ea GTB 20x ea GTB 20x ea     Prone 45, 90 x 2   Prone scap retract/+lift off 20x ea 20x ea 20x  20x each 20x ea 20x ea 20x ea     Upper trap/Levator stretch    :20  3x each :20  3x each :20 3x ea :20 3x ea 20s x 3 ea     Seated Thoracic ext                                        Ther Activity                                       Gait Training                                       Modalities             mhp bilateral UT  seated 10 min     Seated  10'        Mechanical cerv traction   18# to start , 3 steps  static  18#   10 min  DC

## 2023-03-23 ENCOUNTER — OFFICE VISIT (OUTPATIENT)
Dept: PHYSICAL THERAPY | Facility: CLINIC | Age: 53
End: 2023-03-23

## 2023-03-23 DIAGNOSIS — M54.2 NECK PAIN: Primary | ICD-10-CM

## 2023-03-23 NOTE — PROGRESS NOTES
Daily Note     Today's date: 3/23/2023  Patient name: Cristela Nunez  : 1970  MRN: [id number]  Referring provider: Benedicto Jane MD  Dx:   Encounter Diagnosis     ICD-10-CM    1  Neck pain  M54 2                      Subjective: Patient stated she is pleased with significant decrease in pain and improved overall ability to perform functional task at home  Patient reported direct correlation with current exercises and pain relief:  "when I start to feel the fire ants, I do the exercises and it all goes away!"       Objective: See treatment diary below      Assessment: Tolerated treatment and progression of program with added resistance (1# to prone scapular stabilization  and wall walks with TB) without reported aggravation of symptoms    Patient exhibited good technique with therapeutic exercises and would benefit from continued PT      Plan: Continue per plan of care  Progress treatment as tolerated  Precautions: neck pain    [facility]  Access Code: [email or web address]        Manuals 2/9 2/14 2/20 2/22 3/2 3/13 3/16 3/20 3/23    FOTO db   LA       LA                 cerv PA, uPA mobs Grade ii, iii db  db          cerv man traction db  db LA LA NB MM SD LA    Trigger point scap borders prone db  db LA LA NB MM SD LA                              kinesiotape bilat UT "I" strips "I" strips db LA LA NB MM SD                               Neuro Re-Ed                          Back rolls/ scap retract 20x ea 20x ea 20x ea 20x each 20x each 20x ea 20x ea 20x ea 20x each    cerv retract 10 x 2 2s 20x  2s x 20  :02  20x :02  20x :02 20x :02 20x 5s x 20 :05  20x    Supine cerv retract  20x   W/ pillow 20x 20x w/ towel roll 20x w/ towel roll 20x w/ towel roll 20x w/ towel roll  20x  W/ towel roll    Ulnar NG             Bilateral ER  NV?                         Ther Ex             UBE  5' retro 5'  5'  retro 5' retro 5' retro 5' retro 5 min retro Retro  5'    pect stretch   10"x10 in doorway 20s x 5 :20  5x :20  5x :20 5x :20 5x 5x 20s :20  5x    Rows/Ext   2x10 ea RTB gtb x 20 ea  GTB  20x each held GTB 20x ea GTB 20x ea GTB 20x ea GTB  20x each    Prone 45, 90 x 2   Prone scap retract/+lift off 20x ea 20x ea 20x  20x each 20x ea 20x ea 20x ea 1#  20x each    Upper trap/Levator stretch    :20  3x each :20  3x each :20 3x ea :20 3x ea 20s x 3 ea :20  3x each    Seated Thoracic ext              TB wall walks         GTB  10x                 Ther Activity                                       Gait Training                                       Modalities             mhp bilateral UT  seated 10 min     Seated  10'    Pt def  No p!     Mechanical cerv traction   18# to start , 3 steps  static  18#   10 min  DC

## 2023-03-29 ENCOUNTER — APPOINTMENT (OUTPATIENT)
Dept: PHYSICAL THERAPY | Facility: CLINIC | Age: 53
End: 2023-03-29

## 2023-04-03 DIAGNOSIS — B00.9 HSV (HERPES SIMPLEX VIRUS) INFECTION: ICD-10-CM

## 2023-04-03 RX ORDER — ACYCLOVIR 400 MG/1
TABLET ORAL
Qty: 60 TABLET | Refills: 0 | Status: SHIPPED | OUTPATIENT
Start: 2023-04-03 | End: 2024-04-02

## 2023-04-04 ENCOUNTER — OFFICE VISIT (OUTPATIENT)
Dept: PHYSICAL THERAPY | Facility: CLINIC | Age: 53
End: 2023-04-04

## 2023-04-04 DIAGNOSIS — M54.2 NECK PAIN: Primary | ICD-10-CM

## 2023-04-04 NOTE — PROGRESS NOTES
"Daily Note     Today's date: 2023  Patient name: Gurvinder Woodard  : 1970  MRN: [id number]  Referring provider: Elvin Dave MD  Dx:   Encounter Diagnosis     ICD-10-CM    1  Neck pain  M54 2                      Subjective: patient reports that she is feeling good overall noting some tightness off/ on  Notes that she is having some personal stress with ill family member and is requiring trazodone to assist her to sleep ( it is helpful )   Notes feeling good post manual work       Objective: See treatment diary below      Assessment: Tolerated treatment well - increase weight to 2# without issue  Plan: Continue per plan of care  Progress treatment as tolerated  Precautions: neck pain    [facility]  Access Code: [email or web address]        Manuals 2/9 2/14 2/20 2/22 3/2 3/13 3/16 3/20 3/23 4/4   FOTO db   LA       LA                 cerv PA, uPA mobs Grade ii, iii db  db          cerv man traction db  db LA LA NB MM SD LA db   Trigger point scap borders prone db  db LA LA NB MM SD LA db                             kinesiotape bilat UT \"I\" strips \"I\" strips db LA LA NB MM SD                               Neuro Re-Ed                          Back rolls/ scap retract 20x ea 20x ea 20x ea 20x each 20x each 20x ea 20x ea 20x ea 20x each 20x ea   cerv retract 10 x 2 2s 20x  2s x 20  :02  20x :02  20x :02 20x :02 20x 5s x 20 :05  20x 2s x 25   Supine cerv retract  20x   W/ pillow 20x 20x w/ towel roll 20x w/ towel roll 20x w/ towel roll 20x w/ towel roll  20x  W/ towel roll 20x                                           Ther Ex             UBE  5' retro 5'  5'  retro 5' retro 5' retro 5' retro 5 min retro Retro  5' 6' retro    pect stretch   10\"x10 in doorway 20s x 5  :20  5x :20  5x :20 5x :20 5x 5x 20s :20  5x 20s x 5   Rows/Ext / lpd  2x10 ea RTB gtb x 20 ea  GTB  20x each held GTB 20x ea GTB 20x ea GTB 20x ea GTB  20x each gtb x 20 ea   Prone 45, 90 x 2   Prone scap retract/+lift off 20x ea " 20x ea 20x  20x each 20x ea 20x ea 20x ea 1#  20x each 2# x 20 ea    Upper trap/Levator stretch    :20  3x each :20  3x each :20 3x ea :20 3x ea 20s x 3 ea :20  3x each 20s x 3    Seated Thoracic ext              TB wall walks         GTB  10x gtb x 10   Standflex/ scap            2# x 20 ea    Bor/ tricep / bicep curls           2# x 20 ea   Serratus/ triceps          2# x 20 ea                                                        Ther Activity                                       Gait Training                                       Modalities             mhp bilateral UT  seated 10 min     Seated  10'    Pt def  No p! 10'    Mechanical cerv traction   18# to start , 3 steps  static  18#   10 min  DC

## 2023-04-05 ENCOUNTER — TELEPHONE (OUTPATIENT)
Age: 53
End: 2023-04-05

## 2023-04-05 DIAGNOSIS — E78.2 MIXED HYPERLIPIDEMIA: ICD-10-CM

## 2023-04-05 RX ORDER — ATORVASTATIN CALCIUM 20 MG/1
20 TABLET, FILM COATED ORAL DAILY
Qty: 90 TABLET | Refills: 0 | Status: SHIPPED | OUTPATIENT
Start: 2023-04-05

## 2023-04-06 ENCOUNTER — OFFICE VISIT (OUTPATIENT)
Dept: PHYSICAL THERAPY | Facility: CLINIC | Age: 53
End: 2023-04-06

## 2023-04-06 DIAGNOSIS — M54.2 NECK PAIN: Primary | ICD-10-CM

## 2023-04-06 NOTE — PROGRESS NOTES
Daily Note     Today's date: 2023  Patient name: Silvana Manning  : 1970  MRN: [id number]  Referring provider: Brandy Hernandez MD  Dx:   Encounter Diagnosis     ICD-10-CM    1  Neck pain  M54 2                      Subjective: Patient noted gradual improvement in cervical AROM, flexibility and decline in pain intensity and frequency  Objective: See treatment diary below      Assessment: Tolerated treatment continuing to exhibit tissue restriction of B trapezius/rhomboid region, although less than last visit  Patient exhibited good technique with therapeutic exercises and would benefit from continued PT      Plan: Continue per plan of care  Progress treatment as tolerated  Precautions: neck pain    [facility]   Access Code: [email or web address]        Manuals 4/6    3/2 3/13 3/16 3/20 3/23 4/4   FOTO         LA                 cerv PA, uPA mobs             cerv man traction LA    LA NB MM SD LA db   Trigger point scap borders prone LA    LA NB MM SD LA db                             kinesiotape bilat UT     LA NB MM SD                               Neuro Re-Ed                          Back rolls/ scap retract 20x each    20x each 20x ea 20x ea 20x ea 20x each 20x ea   cerv retract :02  20x    :02  20x :02 20x :02 20x 5s x 20 :05  20x 2s x 25   Supine cerv retract :03  20x w/ towel roll    20x w/ towel roll 20x w/ towel roll 20x w/ towel roll  20x  W/ towel roll 20x                                           Ther Ex             UBE 6' retro    5' retro 5' retro 5' retro 5 min retro Retro  5' 6' retro    pect stretch  :20  5x    :20  5x :20 5x :20 5x 5x 20s :20  5x 20s x 5   Rows/Ext / lpd GTB  20x each    held GTB 20x ea GTB 20x ea GTB 20x ea GTB  20x each gtb x 20 ea   Prone 45, 90 x 2  2#  20x each    20x each 20x ea 20x ea 20x ea 1#  20x each 2# x 20 ea    Upper B trap/Levator stretch :20  3x each    :20  3x each :20 3x ea :20 3x ea 20s x 3 ea :20  3x each 20s x 3    Seated Thoracic ext TB wall walks GTB  10x        GTB  10x gtb x 10   Standflex/ scap   2#  20x each         2# x 20 ea    Bor/ tricep / bicep curls  3#  20x each         2# x 20 ea   Serratus/ triceps 2#  20x each         2# x 20 ea                                                        Ther Activity                                       Gait Training                                       Modalities             mhp bilateral UT  seated 10'    Seated  10'    Pt def  No p! 10'

## 2023-04-17 ENCOUNTER — APPOINTMENT (OUTPATIENT)
Dept: PHYSICAL THERAPY | Facility: CLINIC | Age: 53
End: 2023-04-17

## 2023-04-20 ENCOUNTER — APPOINTMENT (OUTPATIENT)
Dept: PHYSICAL THERAPY | Facility: CLINIC | Age: 53
End: 2023-04-20

## 2023-04-24 ENCOUNTER — APPOINTMENT (OUTPATIENT)
Dept: PHYSICAL THERAPY | Facility: CLINIC | Age: 53
End: 2023-04-24

## 2023-04-27 ENCOUNTER — APPOINTMENT (OUTPATIENT)
Dept: PHYSICAL THERAPY | Facility: CLINIC | Age: 53
End: 2023-04-27

## 2023-04-27 DIAGNOSIS — B00.9 HSV (HERPES SIMPLEX VIRUS) INFECTION: ICD-10-CM

## 2023-04-27 RX ORDER — ACYCLOVIR 400 MG/1
TABLET ORAL
Qty: 180 TABLET | Refills: 1 | Status: SHIPPED | OUTPATIENT
Start: 2023-04-27 | End: 2024-04-26

## 2023-07-03 DIAGNOSIS — E78.2 MIXED HYPERLIPIDEMIA: ICD-10-CM

## 2023-07-03 RX ORDER — ATORVASTATIN CALCIUM 20 MG/1
TABLET, FILM COATED ORAL
Qty: 90 TABLET | Refills: 0 | Status: SHIPPED | OUTPATIENT
Start: 2023-07-03

## 2023-07-26 ENCOUNTER — APPOINTMENT (OUTPATIENT)
Age: 53
End: 2023-07-26
Payer: COMMERCIAL

## 2023-07-26 DIAGNOSIS — E78.2 MIXED HYPERLIPIDEMIA: Primary | ICD-10-CM

## 2023-07-26 LAB
ALBUMIN SERPL BCP-MCNC: 3.8 G/DL (ref 3.5–5)
ALP SERPL-CCNC: 77 U/L (ref 46–116)
ALT SERPL W P-5'-P-CCNC: 19 U/L (ref 12–78)
ANION GAP SERPL CALCULATED.3IONS-SCNC: 2 MMOL/L
AST SERPL W P-5'-P-CCNC: 15 U/L (ref 5–45)
BASOPHILS # BLD AUTO: 0.04 THOUSANDS/ÂΜL (ref 0–0.1)
BASOPHILS NFR BLD AUTO: 1 % (ref 0–1)
BILIRUB SERPL-MCNC: 0.82 MG/DL (ref 0.2–1)
BUN SERPL-MCNC: 16 MG/DL (ref 5–25)
CALCIUM SERPL-MCNC: 9.1 MG/DL (ref 8.3–10.1)
CHLORIDE SERPL-SCNC: 111 MMOL/L (ref 96–108)
CHOLEST SERPL-MCNC: 167 MG/DL
CO2 SERPL-SCNC: 30 MMOL/L (ref 21–32)
CREAT SERPL-MCNC: 1.04 MG/DL (ref 0.6–1.3)
EOSINOPHIL # BLD AUTO: 0.24 THOUSAND/ÂΜL (ref 0–0.61)
EOSINOPHIL NFR BLD AUTO: 5 % (ref 0–6)
ERYTHROCYTE [DISTWIDTH] IN BLOOD BY AUTOMATED COUNT: 13.1 % (ref 11.6–15.1)
GFR SERPL CREATININE-BSD FRML MDRD: 61 ML/MIN/1.73SQ M
GLUCOSE P FAST SERPL-MCNC: 94 MG/DL (ref 65–99)
HCT VFR BLD AUTO: 40.6 % (ref 34.8–46.1)
HDLC SERPL-MCNC: 57 MG/DL
HGB BLD-MCNC: 12.8 G/DL (ref 11.5–15.4)
IMM GRANULOCYTES # BLD AUTO: 0.01 THOUSAND/UL (ref 0–0.2)
IMM GRANULOCYTES NFR BLD AUTO: 0 % (ref 0–2)
LDLC SERPL CALC-MCNC: 101 MG/DL (ref 0–100)
LYMPHOCYTES # BLD AUTO: 1.71 THOUSANDS/ÂΜL (ref 0.6–4.47)
LYMPHOCYTES NFR BLD AUTO: 34 % (ref 14–44)
MCH RBC QN AUTO: 28.4 PG (ref 26.8–34.3)
MCHC RBC AUTO-ENTMCNC: 31.5 G/DL (ref 31.4–37.4)
MCV RBC AUTO: 90 FL (ref 82–98)
MONOCYTES # BLD AUTO: 0.57 THOUSAND/ÂΜL (ref 0.17–1.22)
MONOCYTES NFR BLD AUTO: 11 % (ref 4–12)
NEUTROPHILS # BLD AUTO: 2.53 THOUSANDS/ÂΜL (ref 1.85–7.62)
NEUTS SEG NFR BLD AUTO: 49 % (ref 43–75)
NONHDLC SERPL-MCNC: 110 MG/DL
NRBC BLD AUTO-RTO: 0 /100 WBCS
PLATELET # BLD AUTO: 226 THOUSANDS/UL (ref 149–390)
PMV BLD AUTO: 11.4 FL (ref 8.9–12.7)
POTASSIUM SERPL-SCNC: 4.8 MMOL/L (ref 3.5–5.3)
PROT SERPL-MCNC: 7.4 G/DL (ref 6.4–8.4)
RBC # BLD AUTO: 4.5 MILLION/UL (ref 3.81–5.12)
SODIUM SERPL-SCNC: 143 MMOL/L (ref 135–147)
TRIGL SERPL-MCNC: 44 MG/DL
TSH SERPL DL<=0.05 MIU/L-ACNC: 0.63 UIU/ML (ref 0.45–4.5)
WBC # BLD AUTO: 5.1 THOUSAND/UL (ref 4.31–10.16)

## 2023-07-26 PROCEDURE — 84443 ASSAY THYROID STIM HORMONE: CPT

## 2023-07-26 PROCEDURE — 80061 LIPID PANEL: CPT

## 2023-07-26 PROCEDURE — 36415 COLL VENOUS BLD VENIPUNCTURE: CPT

## 2023-07-26 PROCEDURE — 80053 COMPREHEN METABOLIC PANEL: CPT

## 2023-07-26 PROCEDURE — 85025 COMPLETE CBC W/AUTO DIFF WBC: CPT

## 2023-07-28 ENCOUNTER — OFFICE VISIT (OUTPATIENT)
Age: 53
End: 2023-07-28
Payer: COMMERCIAL

## 2023-07-28 VITALS
TEMPERATURE: 97.2 F | BODY MASS INDEX: 23.95 KG/M2 | OXYGEN SATURATION: 97 % | DIASTOLIC BLOOD PRESSURE: 68 MMHG | RESPIRATION RATE: 17 BRPM | WEIGHT: 149 LBS | SYSTOLIC BLOOD PRESSURE: 106 MMHG | HEART RATE: 58 BPM | HEIGHT: 66 IN

## 2023-07-28 DIAGNOSIS — F43.10 PTSD (POST-TRAUMATIC STRESS DISORDER): ICD-10-CM

## 2023-07-28 DIAGNOSIS — I10 ESSENTIAL HYPERTENSION: Primary | ICD-10-CM

## 2023-07-28 DIAGNOSIS — F33.9 DEPRESSION, RECURRENT (HCC): ICD-10-CM

## 2023-07-28 DIAGNOSIS — F41.9 ANXIETY: ICD-10-CM

## 2023-07-28 DIAGNOSIS — E78.2 MIXED HYPERLIPIDEMIA: ICD-10-CM

## 2023-07-28 PROCEDURE — 99214 OFFICE O/P EST MOD 30 MIN: CPT

## 2023-07-28 NOTE — PROGRESS NOTES
Name: Cecilia Alan      : 1970      MRN: [id number]  Encounter Provider: LEIDA Perez  Encounter Date: 2023   Encounter department: [address]       1. Essential hypertension  Blood pressure on the lower side of normal, encourage hydration. Consider reducing antihypertensive medication if blood pressure remains below 061 systolic.  - Lipid Panel with Direct LDL reflex; Future  - CBC and differential; Future  - Comprehensive metabolic panel; Future  - Lipid Panel with Direct LDL reflex  - CBC and differential  - Comprehensive metabolic panel    2. PTSD (post-traumatic stress disorder)  Continue follow-up with behavioral therapy. 3. Anxiety  Continue Klonopin and behavioral therapy follow-up. 4. Mixed hyperlipidemia  Overall cholesterol profile has improved. Continue lifestyle modification.  - Lipid Panel with Direct LDL reflex; Future  - TSH, 3rd generation with Free T4 reflex; Future  - Comprehensive metabolic panel; Future  - Lipid Panel with Direct LDL reflex  - TSH, 3rd generation with Free T4 reflex  - Comprehensive metabolic panel    5. Depression, recurrent ([address])  Continue behavioral therapy follow-up. Follow-up 6 months or earlier if needed    Subjective      Patient is here for scheduled follow-up, she offers no complaints. She is up-to-date on colonoscopy, GYN exam and is scheduled for her mammogram.  She has completed physical therapy for neck pain with improvement. Review of Systems   Constitutional: Negative for activity change, chills and fever. HENT: Negative. Negative for ear pain and sore throat. Eyes: Negative for pain and visual disturbance. Respiratory: Negative for cough and shortness of breath. Cardiovascular: Negative for chest pain and palpitations. Gastrointestinal: Negative for abdominal pain and vomiting. Genitourinary: Negative. Negative for dysuria and hematuria.    Musculoskeletal: Negative for arthralgias and back pain. Skin: Negative for color change and rash. Neurological: Negative for seizures and syncope. Psychiatric/Behavioral: Negative. All other systems reviewed and are negative. Current Outpatient Medications on File Prior to Visit   Medication Sig   • acyclovir (ZOVIRAX) 400 MG tablet TAKE 1 TABLET BY MOUTH TWICE A DAY   • acyclovir (ZOVIRAX) 5 % ointment Apply topically 4 (four) times a day As needed   • amLODIPine (NORVASC) 5 mg tablet Take 5 mg by mouth daily   • atorvastatin (LIPITOR) 20 mg tablet TAKE 1 TABLET BY MOUTH EVERY DAY   • clonazePAM (KlonoPIN) 1 mg tablet Take 1 mg by mouth 3 (three) times a day   • NON FORMULARY Medical Marijuana   • traZODone (DESYREL) 100 mg tablet Take 50 mg by mouth daily at bedtime as needed     • WHEAT BRAN PO Take by mouth Wheat grass herbal supplement   • methocarbamol (ROBAXIN) 500 mg tablet Take 1 tablet (500 mg total) by mouth 2 (two) times a day (Patient not taking: Reported on 7/28/2023)       Objective     /68 (BP Location: Right arm, Patient Position: Sitting, Cuff Size: Standard)   Pulse 58   Temp (!) 97.2 °F (36.2 °C) (Temporal)   Resp 17   Ht 5' 6" (1.676 m)   Wt 67.6 kg (149 lb)   SpO2 97%   BMI 24.05 kg/m²     Physical Exam  Vitals and nursing note reviewed. Constitutional:       Appearance: Normal appearance. HENT:      Head: Normocephalic and atraumatic. Right Ear: Tympanic membrane normal.      Left Ear: Tympanic membrane normal.      Nose: Nose normal.   Eyes:      Extraocular Movements: Extraocular movements intact. Pupils: Pupils are equal, round, and reactive to light. Cardiovascular:      Rate and Rhythm: Normal rate and regular rhythm. Pulmonary:      Effort: Pulmonary effort is normal.      Breath sounds: Normal breath sounds. Abdominal:      General: Abdomen is flat. Palpations: Abdomen is soft. Musculoskeletal:         General: Normal range of motion.       Cervical back: Normal range of motion and neck supple. Skin:     General: Skin is warm and dry. Neurological:      General: No focal deficit present. Mental Status: She is alert and oriented to person, place, and time.    Psychiatric:         Mood and Affect: Mood normal.         Behavior: Behavior normal.       LEIDA Castanon

## 2023-08-02 DIAGNOSIS — B00.9 HSV (HERPES SIMPLEX VIRUS) INFECTION: ICD-10-CM

## 2023-08-02 RX ORDER — ACYCLOVIR 50 MG/G
OINTMENT TOPICAL 4 TIMES DAILY
Qty: 30 G | Refills: 0 | Status: SHIPPED | OUTPATIENT
Start: 2023-08-02

## 2023-09-12 DIAGNOSIS — E78.2 MIXED HYPERLIPIDEMIA: ICD-10-CM

## 2023-09-12 DIAGNOSIS — B00.9 HSV (HERPES SIMPLEX VIRUS) INFECTION: ICD-10-CM

## 2023-09-12 RX ORDER — ATORVASTATIN CALCIUM 20 MG/1
TABLET, FILM COATED ORAL
Qty: 90 TABLET | Refills: 0 | Status: SHIPPED | OUTPATIENT
Start: 2023-09-12

## 2023-09-12 RX ORDER — ACYCLOVIR 400 MG/1
TABLET ORAL
Qty: 180 TABLET | Refills: 3 | Status: SHIPPED | OUTPATIENT
Start: 2023-09-12 | End: 2024-09-11

## 2023-09-12 RX ORDER — ACYCLOVIR 50 MG/G
OINTMENT TOPICAL
Qty: 30 G | Refills: 0 | Status: SHIPPED | OUTPATIENT
Start: 2023-09-12

## 2023-11-22 ENCOUNTER — OFFICE VISIT (OUTPATIENT)
Age: 53
End: 2023-11-22
Payer: COMMERCIAL

## 2023-11-22 VITALS
WEIGHT: 153 LBS | OXYGEN SATURATION: 100 % | HEART RATE: 55 BPM | RESPIRATION RATE: 18 BRPM | TEMPERATURE: 97.5 F | BODY MASS INDEX: 24.69 KG/M2

## 2023-11-22 DIAGNOSIS — M79.604 PAIN IN BOTH LOWER EXTREMITIES: Primary | ICD-10-CM

## 2023-11-22 DIAGNOSIS — M79.605 PAIN IN BOTH LOWER EXTREMITIES: Primary | ICD-10-CM

## 2023-11-22 PROCEDURE — 99214 OFFICE O/P EST MOD 30 MIN: CPT | Performed by: PHYSICIAN ASSISTANT

## 2023-11-22 RX ORDER — IBUPROFEN 200 MG
400 TABLET ORAL EVERY 6 HOURS PRN
Qty: 30 TABLET | Refills: 0 | Status: SHIPPED | OUTPATIENT
Start: 2023-11-22

## 2023-11-22 NOTE — PATIENT INSTRUCTIONS
Muscle Cramp   WHAT YOU NEED TO KNOW:   A muscle cramp is a sudden, sharp pain or spasm in a muscle. It lasts from a few seconds to a few minutes. Muscle cramps most often occur in the legs or feet. They are also common along the ribcage and in the arms and hands. DISCHARGE INSTRUCTIONS:   Return to the emergency department if:   You cannot urinate, or your urine is dark yellow or brown within 24 hours of the cramp. You have pain in your neck or back. Your arm or leg is weak or numb, or the area around your cramp is numb. You have trouble moving your cramped muscle. Call your doctor if:   Your cramp does not go away, even after you stretch and apply ice or heat. You have muscle cramps often. You have questions or concerns about your condition or care. Manage a muscle cramp:  Muscle cramps often go away without any treatment. You can do the following to help relieve a cramp:  Stop the activity  that caused the muscle cramp. Stretch or massage  your muscle until the cramp goes away. Apply ice  to decrease swelling and pain. Use an ice pack, or put crushed ice in a plastic bag. Cover the bag with a towel before you place it on your sore muscles. Apply ice for 15 to 20 minutes every hour, or as directed. Apply heat  to decrease pain and muscle spasms. Apply heat for 20 to 30 minutes every 2 hours for as many days as directed. Prevent a muscle cramp:   Stretch your muscles. Stretch 3 times daily, including before bedtime and before exercise. Stretch briefly, and then release each stretch. Do not stretch so far that you feel pain. Daily stretches will relax your muscles and increase flexibility. Ask your healthcare provider for instructions on muscle stretches that are right for you. Warm up and cool down when you exercise. Run in place slowly or walk at a brisk pace to warm your muscles before you exercise. When you finish exercising, walk for a few minutes to cool down. Drink liquids as directed. Liquids can help prevent muscle cramps caused by dehydration. Ask your healthcare provider how much liquid to drink each day and which liquids are best for you. You may need to drink liquids that replace lost electrolytes, such as sports drinks. Eat a variety of healthy foods. Healthy foods may help prevent muscle cramps. Healthy foods include bananas, beans, avocados, or other foods high in electrolytes. Ask if you should eat more carbohydrates. Follow up with your doctor as directed:  Write down your questions so you remember to ask them during your visits. © Copyright [facility] 2023 Information is for End User's use only and may not be sold, redistributed or otherwise used for commercial purposes. The above information is an  only. It is not intended as medical advice for individual conditions or treatments. Talk to your doctor, nurse or pharmacist before following any medical regimen to see if it is safe and effective for you.

## 2023-12-09 DIAGNOSIS — E78.2 MIXED HYPERLIPIDEMIA: ICD-10-CM

## 2023-12-11 RX ORDER — ATORVASTATIN CALCIUM 20 MG/1
TABLET, FILM COATED ORAL
Qty: 90 TABLET | Refills: 0 | Status: SHIPPED | OUTPATIENT
Start: 2023-12-11

## 2024-01-29 ENCOUNTER — APPOINTMENT (OUTPATIENT)
Age: 54
End: 2024-01-29
Payer: COMMERCIAL

## 2024-01-29 DIAGNOSIS — I10 ESSENTIAL HYPERTENSION, BENIGN: ICD-10-CM

## 2024-01-29 DIAGNOSIS — E78.2 MIXED HYPERLIPIDEMIA: Primary | ICD-10-CM

## 2024-01-29 LAB
ALBUMIN SERPL BCP-MCNC: 4.4 G/DL (ref 3.5–5)
ALP SERPL-CCNC: 60 U/L (ref 34–104)
ALT SERPL W P-5'-P-CCNC: 11 U/L (ref 7–52)
ANION GAP SERPL CALCULATED.3IONS-SCNC: 9 MMOL/L
AST SERPL W P-5'-P-CCNC: 14 U/L (ref 13–39)
BASOPHILS # BLD AUTO: 0.04 THOUSANDS/ÂΜL (ref 0–0.1)
BASOPHILS NFR BLD AUTO: 1 % (ref 0–1)
BILIRUB SERPL-MCNC: 0.94 MG/DL (ref 0.2–1)
BUN SERPL-MCNC: 14 MG/DL (ref 5–25)
CALCIUM SERPL-MCNC: 9.5 MG/DL (ref 8.4–10.2)
CHLORIDE SERPL-SCNC: 105 MMOL/L (ref 96–108)
CHOLEST SERPL-MCNC: 240 MG/DL
CO2 SERPL-SCNC: 28 MMOL/L (ref 21–32)
CREAT SERPL-MCNC: 0.9 MG/DL (ref 0.6–1.3)
EOSINOPHIL # BLD AUTO: 0.16 THOUSAND/ÂΜL (ref 0–0.61)
EOSINOPHIL NFR BLD AUTO: 3 % (ref 0–6)
ERYTHROCYTE [DISTWIDTH] IN BLOOD BY AUTOMATED COUNT: 13.6 % (ref 11.6–15.1)
GFR SERPL CREATININE-BSD FRML MDRD: 72 ML/MIN/1.73SQ M
GLUCOSE P FAST SERPL-MCNC: 83 MG/DL (ref 65–99)
HCT VFR BLD AUTO: 41.7 % (ref 34.8–46.1)
HDLC SERPL-MCNC: 55 MG/DL
HGB BLD-MCNC: 13.1 G/DL (ref 11.5–15.4)
IMM GRANULOCYTES # BLD AUTO: 0.01 THOUSAND/UL (ref 0–0.2)
IMM GRANULOCYTES NFR BLD AUTO: 0 % (ref 0–2)
LDLC SERPL CALC-MCNC: 173 MG/DL (ref 0–100)
LYMPHOCYTES # BLD AUTO: 2.16 THOUSANDS/ÂΜL (ref 0.6–4.47)
LYMPHOCYTES NFR BLD AUTO: 37 % (ref 14–44)
MCH RBC QN AUTO: 28.8 PG (ref 26.8–34.3)
MCHC RBC AUTO-ENTMCNC: 31.4 G/DL (ref 31.4–37.4)
MCV RBC AUTO: 92 FL (ref 82–98)
MONOCYTES # BLD AUTO: 0.54 THOUSAND/ÂΜL (ref 0.17–1.22)
MONOCYTES NFR BLD AUTO: 9 % (ref 4–12)
NEUTROPHILS # BLD AUTO: 2.88 THOUSANDS/ÂΜL (ref 1.85–7.62)
NEUTS SEG NFR BLD AUTO: 50 % (ref 43–75)
NRBC BLD AUTO-RTO: 0 /100 WBCS
PLATELET # BLD AUTO: 254 THOUSANDS/UL (ref 149–390)
PMV BLD AUTO: 11.4 FL (ref 8.9–12.7)
POTASSIUM SERPL-SCNC: 4.3 MMOL/L (ref 3.5–5.3)
PROT SERPL-MCNC: 7.1 G/DL (ref 6.4–8.4)
RBC # BLD AUTO: 4.55 MILLION/UL (ref 3.81–5.12)
SODIUM SERPL-SCNC: 142 MMOL/L (ref 135–147)
TRIGL SERPL-MCNC: 61 MG/DL
TSH SERPL DL<=0.05 MIU/L-ACNC: 1.32 UIU/ML (ref 0.45–4.5)
WBC # BLD AUTO: 5.79 THOUSAND/UL (ref 4.31–10.16)

## 2024-01-29 PROCEDURE — 80061 LIPID PANEL: CPT

## 2024-01-29 PROCEDURE — 80053 COMPREHEN METABOLIC PANEL: CPT

## 2024-01-29 PROCEDURE — 36415 COLL VENOUS BLD VENIPUNCTURE: CPT

## 2024-01-29 PROCEDURE — 85025 COMPLETE CBC W/AUTO DIFF WBC: CPT

## 2024-01-29 PROCEDURE — 84443 ASSAY THYROID STIM HORMONE: CPT

## 2024-02-05 ENCOUNTER — OFFICE VISIT (OUTPATIENT)
Age: 54
End: 2024-02-05
Payer: COMMERCIAL

## 2024-02-05 VITALS
TEMPERATURE: 97 F | DIASTOLIC BLOOD PRESSURE: 72 MMHG | WEIGHT: 156.3 LBS | RESPIRATION RATE: 16 BRPM | HEIGHT: 66 IN | BODY MASS INDEX: 25.12 KG/M2 | SYSTOLIC BLOOD PRESSURE: 116 MMHG | HEART RATE: 54 BPM | OXYGEN SATURATION: 99 %

## 2024-02-05 DIAGNOSIS — I10 ESSENTIAL HYPERTENSION: Primary | ICD-10-CM

## 2024-02-05 DIAGNOSIS — E78.2 MIXED HYPERLIPIDEMIA: ICD-10-CM

## 2024-02-05 DIAGNOSIS — F41.9 ANXIETY: ICD-10-CM

## 2024-02-05 PROBLEM — F33.9 DEPRESSION, RECURRENT (HCC): Status: RESOLVED | Noted: 2021-10-13 | Resolved: 2024-02-05

## 2024-02-05 PROCEDURE — 99214 OFFICE O/P EST MOD 30 MIN: CPT | Performed by: INTERNAL MEDICINE

## 2024-02-05 NOTE — PROGRESS NOTES
INTERNAL MEDICINE OFFICE VISIT  [facility] [place] [address]  Tel: [phone]      NAME: Christina Andrade  AGE: 53 y.o.  SEX: female  : 1970   MRN: [id number]    DATE: 2024  TIME: 1:44 PM      Assessment and Plan:  1. Essential hypertension  Blood pressure is very well-controlled on amlodipine.  She wants to wean herself off the medication.  I told her to try taking 2.5 mg and see if her blood pressure is stable on it    2. Mixed hyperlipidemia  She took herself off the atorvastatin and has not been taking it for more than a month.  Her LDL increased to 179.  I told her to stop taking it again and we will recheck labs in 6 months.    - CBC and differential; Future  - Comprehensive metabolic panel; Future  - Lipid panel; Future  - TSH, 3rd generation; Future    3. Anxiety  Has been weaning herself off the Klonopin.  Now she is taking 1 tablet daily.  I encouraged her to stop taking it altogether.      - Counseling Documentation: patient was counseled regarding: diagnostic results, instructions for management, risk factor reductions, prognosis, patient and family education, risks and benefits of treatment options, and importance of compliance with treatment  - Medication Side Effects: Adverse side effects of medications were reviewed with the patient/guardian today.      Return for follow up visit in 6 months or earlier, if needed.      Chief Complaint:  Chief Complaint   Patient presents with    Follow-up         History of Present Illness:   Blood pressure is stable on the present medication but she does not want to take the medication  Her total cholesterol and LDL have increased tremendously since she has not been taking the atorvastatin for 1 month.  She is weaning herself off the Klonopin and is down to 1 tablet a day.  She wants to take something else for anxiety and I told her to discuss it with her psychiatrist to start her on  BuSpar.      Active Problem List:  Patient Active Problem List   Diagnosis    PTSD (post-traumatic stress disorder)    HSV (herpes simplex virus) infection    Family history of breast cancer    Bradycardia    Essential hypertension    Anxiety    Genetic testing    Family history of ovarian cancer    Neck pain    Mixed hyperlipidemia         Past Medical History:  Past Medical History:   Diagnosis Date    Abnormal Pap smear of cervix     Acute low back pain 2021    Anxiety     Bradycardia     Colon polyp     Depression     Gastric ulcer     Hernia 2009    Hypertension 2006    Lactose intolerance     MDD (major depressive disorder)     PTSD (post-traumatic stress disorder)          Past Surgical History:  Past Surgical History:   Procedure Laterality Date    BREAST BIOPSY  1987    BREAST CYST EXCISION Left 1984    benign    CERVICAL BIOPSY  W/ LOOP ELECTRODE EXCISION       SECTION  2013    HYSTERECTOMY  2008    Partial    OOPHORECTOMY Bilateral 2015    PARTIAL HYSTERECTOMY  2014    supracervical hysterectomy         Family History:  Family History   Problem Relation Age of Onset    Diabetes Mother     Lung cancer Father         non smoker    Colon polyps Father     Cancer Father     Pancreatic cancer Maternal Grandmother     Heart disease Paternal Grandmother     Hypertension Paternal Grandmother     Breast cancer Maternal Aunt 45    Uterine cancer Maternal Aunt     Breast cancer Maternal Aunt 52    Stroke Sister     No Known Problems Brother     Other Sister         Hydrocephalus    Colon cancer Neg Hx     Ovarian cancer Neg Hx     Cervical cancer Neg Hx          Social History:  Social History     Socioeconomic History    Marital status: /Civil Union     Spouse name: None    Number of children: None    Years of education: None    Highest education level: None   Occupational History    None   Tobacco Use    Smoking status: Never    Smokeless tobacco: Never    Tobacco comments:     Medical  marijuana   Vaping Use    Vaping status: Never Used   Substance and Sexual Activity    Alcohol use: Yes     Alcohol/week: 1.0 standard drink of alcohol     Types: 1 Glasses of wine per week    Drug use: Yes     Types: Marijuana     Comment: Medical Marijuana Card    Sexual activity: Yes     Partners: Male     Birth control/protection: Surgical, Female Sterilization   Other Topics Concern    None   Social History Narrative    None     Social Determinants of Health     Financial Resource Strain: Not on file   Food Insecurity: Not on file   Transportation Needs: Not on file   Physical Activity: Inactive (5/20/2021)    Exercise Vital Sign     Days of Exercise per Week: 0 days     Minutes of Exercise per Session: 0 min   Stress: No Stress Concern Present (7/28/2023)    [employer] - Occupational Stress Questionnaire     Feeling of Stress : Not at all   Social Connections: Not on file   Intimate Partner Violence: Not on file   Housing Stability: Not on file         Allergies:  Allergies   Allergen Reactions    Lisinopril Angioedema         Medications:    Current Outpatient Medications:     acyclovir (ZOVIRAX) 400 MG tablet, TAKE 1 TABLET BY MOUTH TWICE A DAY, Disp: 180 tablet, Rfl: 3    acyclovir (ZOVIRAX) 5 % ointment, APPLY TOPICALLY 4 TIMES A DAY AS NEEDED., Disp: 30 g, Rfl: 0    amLODIPine (NORVASC) 5 mg tablet, Take 5 mg by mouth daily Patient states she is weaning off, Disp: , Rfl: 0    atorvastatin (LIPITOR) 20 mg tablet, TAKE 1 TABLET BY MOUTH EVERY DAY (Patient taking differently: Take 20 mg by mouth daily Patient states she is weaning off), Disp: 90 tablet, Rfl: 0    clonazePAM (KlonoPIN) 1 mg tablet, Take 1 mg by mouth daily Taking half pills a day, Disp: , Rfl: 0    NON FORMULARY, Medical Marijuana, Disp: , Rfl:     traZODone (DESYREL) 100 mg tablet, Take 50 mg by mouth daily at bedtime as needed  , Disp: , Rfl:       The following portions of the patient's history were reviewed  and updated as appropriate: past medical history, past surgical history, family history, social history, allergies, current medications and active problem list.      Review of Systems:  Constitutional: Denies fever, chills, weight gain, weight loss, fatigue  Eyes: Denies eye redness, eye discharge, double vision, change in visual acuity  ENT: Denies hearing loss, tinnitus, sneezing, nasal congestion, nasal discharge, sore throat   Respiratory: Denies cough, expectoration, hemoptysis, shortness of breath, wheezing  Cardiovascular: Denies chest pain, palpitations, lower extremity swelling, orthopnea, PND  Gastrointestinal: Denies abdominal pain, heartburn, nausea, vomiting, hematemesis, diarrhea, bloody stools  Genito-Urinary: Denies dysuria, frequency, difficulty in micturition, nocturia, incontinence  Musculoskeletal: Denies back pain, joint pain, muscle pain  Neurologic: Denies confusion, lightheadedness, syncope, headache, focal weakness, sensory changes, seizures  Endocrine: Denies polyuria, polydipsia, temperature intolerance  Allergy and Immunology: Denies hives, insect bite sensitivity  Hematological and Lymphatic: Denies bleeding problems, swollen glands   Psychological: Denies depression, suicidal ideation, anxiety, panic, mood swings  Dermatological: Denies pruritus, rash, skin lesion changes      Vitals:  Vitals:    02/05/24 1321   BP: 116/72   Pulse: (!) 54   Resp: 16   Temp: (!) 97 °F (36.1 °C)   SpO2: 99%       Body mass index is 25.23 kg/m².    Weight (last 2 days)       Date/Time Weight    02/05/24 1321 70.9 (156.3)              Physical Examination:  General: Patient is not in acute distress. Awake, alert, responding to commands. No weight gain or loss  Head: Normocephalic. Atraumatic  Eyes: Conjunctiva and lids with no swelling, erythema or discharge. Both pupils normal sized, round and reactive to light. Sclera nonicteric  ENT: External examination of nose and ear normal. Otoscopic examination  "shows translucent tympanic membranes with patent canals without erythema. Oropharynx moist with no erythema, edema, exudate or lesions  Neck: Supple. JVP not raised. Trachea midline. No masses. No thyromegaly  Lungs: No signs of increased work of breathing or respiratory distress. Bilateral bronchovascular breath sounds with no crackles or rhonchi  Chest wall: No tenderness  Cardiovascular: Normal PMI. No thrills. Regular rate and rhythm. S1 and S2 normal. No murmur, rub or gallop  Gastrointestinal: Abdomen soft, nontender. No guarding or rigidity. Liver and spleen not palpable. Bowel sounds present  Neurologic: Cranial nerves II-XII intact. Cortical functions normal. Motor system - Reflexes 2+ and symmetrical. Sensations normal  Musculoskeletal: Gait normal. No joint tenderness  Integumentary: Skin normal with no rash or lesions  Lymphatic: No palpable lymph nodes in neck, axilla or groin  Extremities: No clubbing, cyanosis, edema or varicosities  Psychological: Judgement and insight normal. Mood and affect normal      Laboratory Results:  CBC with diff:   Lab Results   Component Value Date    WBC 5.79 01/29/2024    RBC 4.55 01/29/2024    HGB 13.1 01/29/2024    HCT 41.7 01/29/2024    MCV 92 01/29/2024    MCH 28.8 01/29/2024    RDW 13.6 01/29/2024     01/29/2024       CMP:  Lab Results   Component Value Date    CREATININE 0.90 01/29/2024    CREATININE 0.89 01/19/2020    BUN 14 01/29/2024    BUN 17 01/19/2020    K 4.3 01/29/2024    K 3.8 01/19/2020     01/29/2024     01/19/2020    CO2 28 01/29/2024    CO2 29 01/19/2020    ALKPHOS 60 01/29/2024    ALKPHOS 60 01/19/2020    ALT 11 01/29/2024    ALT 12 01/19/2020    AST 14 01/29/2024    AST 14 01/19/2020       No results found for: \"HGBA1C\", \"MG\", \"PHOS\"    No results found for: \"TROPONINI\", \"CKMB\", \"CKTOTAL\"    Lipid Profile:   No results found for: \"CHOL\"  Lab Results   Component Value Date    HDL 55 01/29/2024    HDL 57 07/26/2023     Lab Results "   Component Value Date    LDLCALC 173 (H) 01/29/2024    LDLCALC 101 (H) 07/26/2023     Lab Results   Component Value Date    TRIG 61 01/29/2024    TRIG 44 07/26/2023       Imaging Results:  Colonoscopy  Narrative: Table formatting from the original result was not included.   [facility]  [address]  [place] [address]  [phone]    DATE OF SERVICE:  3/08/23    PHYSICIAN(S):  Attending:   Satya Childress MD     Fellow:   No Staff Documented     INDICATION:  Screening for colorectal cancer, History of colon polyps    POST-OP DIAGNOSIS:  See the impression below.    HISTORY:  Prior colonoscopy: Less than 3 years ago. It is being repeated at an   interval of less than 3 years because: Last colonoscopy had inadequate   bowel prep    BOWEL PREPARATION:  Miralax/Dulcolax    PREPROCEDURE:  Informed consent was obtained for the procedure, including sedation. Risks   including but not limited to bleeding, infection, perforation, adverse   drug reaction and aspiration were explained in detail. Also explained   about less than 100% sensitivity with the exam and other alternatives. The   patient was placed in the left lateral decubitus position.    Procedure: Colonoscopy     DETAILS OF PROCEDURE:   Patient was taken to the procedure room where a time out was performed to   confirm correct patient and correct procedure. The patient underwent   monitored anesthesia care, which was administered by an anesthesia   professional. The patient's blood pressure, heart rate, level of   consciousness, oxygen and respirations were monitored throughout the   procedure. A digital rectal exam was performed. The scope was introduced   through the anus and advanced to the cecum. Retroflexion was performed in   the rectum. The quality of bowel preparation was evaluated using the   Watson Bowel Preparation Scale with scores of: right colon = 2, transverse   colon = 2, left colon = 2. The total BBPS score  was 6. Bowel prep was   adequate. The patient's estimated blood loss was minimal (<5 mL). The   procedure was not difficult. The patient tolerated the procedure well.   There were no apparent complications.     ANESTHESIA INFORMATION:  ASA: II  Anesthesia Type: IV Sedation with Anesthesia    MEDICATIONS:  No administrations occurring from 0934 to 0952 on 03/08/23     FINDINGS:  Two 2 mm sessile polyps in the distal transverse colon; performed complete   en bloc removal by cold forceps biopsy  Two 2 mm sessile polyps in the rectosigmoid; performed complete en bloc   removal by cold forceps biopsy    EVENTS:  Procedure Events   Event Event Time   ENDO CECUM REACHED 3/8/2023  9:44 AM   ENDO SCOPE OUT TIME 3/8/2023  9:51 AM     SPECIMENS:  ID Type Source Tests Collected by Time Destination   1 : rectosigmoid x2 Tissue Polyp, Colorectal TISSUE EXAM Satya Childress MD 3/8/2023  9:40 AM    2 : distal transverse x2 Tissue Polyp, Colorectal TISSUE EXAM Satya Childress MD 3/8/2023  9:41 AM      EQUIPMENT:  Colonoscope -[id number]   Impression: #1.  2 diminutive distal transverse polyps status post excisional biopsy   removal  #2.  2 diminutive rectosigmoid polyps status post excisional biopsy   removal    RECOMMENDATION:   Repeat colonoscopy in 5 years    Personal history of colon polyps          Follow-up biopsy results in 3 weeks       Satya Childress MD        Health Maintenance:  Health Maintenance   Topic Date Due    COVID-19 Vaccine (1) Never done    Zoster Vaccine (1 of 2) Never done    Influenza Vaccine (1) 09/01/2023    Breast Cancer Screening: Mammogram  12/15/2023    Annual Physical  02/07/2024    Depression Screening  07/28/2024    DTaP,Tdap,and Td Vaccines (2 - Td or Tdap) 11/29/2027    Colorectal Cancer Screening  03/06/2028    HIV Screening  Completed    Hepatitis C Screening  Completed    Pneumococcal Vaccine: Pediatrics (0 to 5 Years) and At-Risk Patients (6 to 64 Years)  Aged Out    HIB Vaccine   Aged Out    IPV Vaccine  Aged Out    Hepatitis A Vaccine  Aged Out    Meningococcal ACWY Vaccine  Aged Out    HPV Vaccine  Aged Out     Immunization History   Administered Date(s) Administered    Fluzone Split Quad 0.5 mL 11/29/2017    INFLUENZA 01/28/2017, 11/29/2017    Tdap 11/29/2017         Kristopher Crane MD  2/5/2024,1:44 PM

## 2024-03-29 DIAGNOSIS — E78.2 MIXED HYPERLIPIDEMIA: ICD-10-CM

## 2024-04-01 RX ORDER — ATORVASTATIN CALCIUM 20 MG/1
TABLET, FILM COATED ORAL
Qty: 90 TABLET | Refills: 0 | Status: SHIPPED | OUTPATIENT
Start: 2024-04-01

## 2024-04-18 ENCOUNTER — OFFICE VISIT (OUTPATIENT)
Age: 54
End: 2024-04-18
Payer: COMMERCIAL

## 2024-04-18 VITALS
HEIGHT: 66 IN | OXYGEN SATURATION: 100 % | HEART RATE: 60 BPM | WEIGHT: 138 LBS | RESPIRATION RATE: 16 BRPM | TEMPERATURE: 98.3 F | DIASTOLIC BLOOD PRESSURE: 60 MMHG | SYSTOLIC BLOOD PRESSURE: 104 MMHG | BODY MASS INDEX: 22.18 KG/M2

## 2024-04-18 DIAGNOSIS — H93.A3 PULSATILE TINNITUS OF BOTH EARS: Primary | ICD-10-CM

## 2024-04-18 DIAGNOSIS — Z12.31 ENCOUNTER FOR SCREENING MAMMOGRAM FOR BREAST CANCER: ICD-10-CM

## 2024-04-18 PROCEDURE — 99213 OFFICE O/P EST LOW 20 MIN: CPT | Performed by: INTERNAL MEDICINE

## 2024-04-18 NOTE — PROGRESS NOTES
INTERNAL MEDICINE FOLLOW-UP OFFICE VISIT  [facility]    NAME: Christina Andrade  AGE: 53 y.o. SEX: female  : 1970   MRN: [id number]    DATE: 2024  TIME: 1:50 PM    Assessment and Plan     1. Pulsatile tinnitus of both ears  Was advised to follow-up with ENT.  Will get MRI of the brain to rule out any pathology.    - MRI brain wo contrast; Future  - Ambulatory Referral to Otolaryngology; Future    2. Encounter for screening mammogram for breast cancer      - Counseling Documentation: patient was counseled regarding: instructions for management, risk factor reductions, prognosis, patient and family education, risks and benefits of treatment options, and importance of compliance with treatment  - Medication Side Effects: Adverse side effects of medications were reviewed with the patient/guardian today.    Return to office in: As needed    Chief Complaint     Chief Complaint   Patient presents with    Tinnitus       History of Present Illness     HPI  For the last 3 weeks patient has been having increasing ringing in her ears which is synchronous with the pulse.  It is concerning her.  She does not have any other symptoms.    The following portions of the patient's history were reviewed and updated as appropriate: allergies, current medications, past family history, past medical history, past social history, past surgical history and problem list.    Review of Systems     Review of Systems   Constitutional:  Negative for chills, diaphoresis, fatigue and fever.   HENT:  Positive for tinnitus. Negative for congestion, ear discharge, ear pain, hearing loss, postnasal drip, rhinorrhea, sinus pressure, sinus pain, sneezing, sore throat and voice change.    Eyes:  Negative for pain, discharge, redness and visual disturbance.   Respiratory:  Negative for cough, chest tightness, shortness of breath and wheezing.    Cardiovascular:  Negative for chest pain, palpitations and leg  "swelling.   Gastrointestinal:  Negative for abdominal distention, abdominal pain, blood in stool, constipation, diarrhea, nausea and vomiting.   Endocrine: Negative for cold intolerance, heat intolerance, polydipsia, polyphagia and polyuria.   Genitourinary:  Negative for dysuria, flank pain, frequency, hematuria and urgency.   Musculoskeletal:  Negative for arthralgias, back pain, gait problem, joint swelling, myalgias, neck pain and neck stiffness.   Skin:  Negative for rash.   Neurological:  Negative for dizziness, tremors, syncope, facial asymmetry, speech difficulty, weakness, light-headedness, numbness and headaches.   Hematological:  Does not bruise/bleed easily.   Psychiatric/Behavioral:  Negative for behavioral problems, confusion and sleep disturbance. The patient is not nervous/anxious.        Active Problem List     Patient Active Problem List   Diagnosis    PTSD (post-traumatic stress disorder)    HSV (herpes simplex virus) infection    Family history of breast cancer    Bradycardia    Essential hypertension    Anxiety    Genetic testing    Family history of ovarian cancer    Neck pain    Mixed hyperlipidemia    Pulsatile tinnitus of both ears       Objective     /60 (BP Location: Left arm, Patient Position: Sitting, Cuff Size: Standard)   Pulse 60   Temp 98.3 °F (36.8 °C) (Tympanic)   Resp 16   Ht 5' 6\" (1.676 m)   Wt 62.6 kg (138 lb)   SpO2 100%   BMI 22.27 kg/m²     Physical Exam  Constitutional:       General: She is not in acute distress.     Appearance: She is well-developed. She is not diaphoretic.   HENT:      Head: Normocephalic and atraumatic.      Right Ear: External ear normal.      Left Ear: External ear normal.      Nose: Nose normal.   Eyes:      General: No scleral icterus.        Right eye: No discharge.         Left eye: No discharge.      Conjunctiva/sclera: Conjunctivae normal.   Neck:      Thyroid: No thyromegaly.      Vascular: No JVD.      Trachea: No tracheal " deviation.   Cardiovascular:      Rate and Rhythm: Normal rate and regular rhythm.      Heart sounds: Normal heart sounds. No murmur heard.     No friction rub. No gallop.   Pulmonary:      Effort: Pulmonary effort is normal. No respiratory distress.      Breath sounds: Normal breath sounds. No wheezing or rales.   Chest:      Chest wall: No tenderness.   Abdominal:      General: Bowel sounds are normal. There is no distension.      Palpations: Abdomen is soft.      Tenderness: There is no abdominal tenderness. There is no guarding or rebound.   Musculoskeletal:         General: No tenderness. Normal range of motion.      Cervical back: Normal range of motion and neck supple.   Lymphadenopathy:      Cervical: No cervical adenopathy.   Skin:     General: Skin is warm and dry.      Findings: No erythema or rash.   Neurological:      Mental Status: She is alert and oriented to person, place, and time.      Cranial Nerves: No cranial nerve deficit.      Motor: No abnormal muscle tone.      Coordination: Coordination normal.   Psychiatric:         Judgment: Judgment normal.         Pertinent Laboratory/Diagnostic Studies:        Current Medications       Current Outpatient Medications:     acyclovir (ZOVIRAX) 400 MG tablet, TAKE 1 TABLET BY MOUTH TWICE A DAY, Disp: 180 tablet, Rfl: 3    acyclovir (ZOVIRAX) 5 % ointment, APPLY TOPICALLY 4 TIMES A DAY AS NEEDED., Disp: 30 g, Rfl: 0    amLODIPine (NORVASC) 5 mg tablet, Take 5 mg by mouth daily Patient states she is weaning off, Disp: , Rfl: 0    atorvastatin (LIPITOR) 20 mg tablet, TAKE 1 TABLET BY MOUTH EVERY DAY, Disp: 90 tablet, Rfl: 0    clonazePAM (KlonoPIN) 1 mg tablet, Take 1 mg by mouth daily Taking half pills a day, Disp: , Rfl: 0    NON FORMULARY, Medical Marijuana, Disp: , Rfl:     traZODone (DESYREL) 100 mg tablet, Take 50 mg by mouth daily at bedtime as needed  , Disp: , Rfl:     Health Maintenance     Health Maintenance   Topic Date Due    Zoster Vaccine (1 of  2) Never done    Influenza Vaccine (1) 09/01/2023    COVID-19 Vaccine (1 - 2023-24 season) Never done    Breast Cancer Screening: Mammogram  12/15/2023    Annual Physical  02/07/2024    Depression Screening  07/28/2024    DTaP,Tdap,and Td Vaccines (2 - Td or Tdap) 11/29/2027    Colorectal Cancer Screening  03/06/2028    HIV Screening  Completed    Hepatitis C Screening  Completed    Pneumococcal Vaccine: Pediatrics (0 to 5 Years) and At-Risk Patients (6 to 64 Years)  Aged Out    HIB Vaccine  Aged Out    IPV Vaccine  Aged Out    Hepatitis A Vaccine  Aged Out    Meningococcal ACWY Vaccine  Aged Out    HPV Vaccine  Aged Out     Immunization History   Administered Date(s) Administered    Fluzone Split Quad 0.5 mL 11/29/2017    INFLUENZA 01/28/2017, 11/29/2017    Tdap 11/29/2017         Kristopher Crane MD  [facility]

## 2024-04-24 ENCOUNTER — TELEPHONE (OUTPATIENT)
Dept: OBGYN CLINIC | Facility: CLINIC | Age: 54
End: 2024-04-24

## 2024-04-26 ENCOUNTER — HOSPITAL ENCOUNTER (OUTPATIENT)
Dept: MRI IMAGING | Facility: HOSPITAL | Age: 54
End: 2024-04-26
Attending: INTERNAL MEDICINE
Payer: COMMERCIAL

## 2024-04-26 DIAGNOSIS — H93.A3 PULSATILE TINNITUS OF BOTH EARS: ICD-10-CM

## 2024-04-26 PROCEDURE — A9585 GADOBUTROL INJECTION: HCPCS | Performed by: INTERNAL MEDICINE

## 2024-04-26 PROCEDURE — 70553 MRI BRAIN STEM W/O & W/DYE: CPT

## 2024-04-26 RX ORDER — GADOBUTROL 604.72 MG/ML
6 INJECTION INTRAVENOUS
Status: COMPLETED | OUTPATIENT
Start: 2024-04-26 | End: 2024-04-26

## 2024-04-26 RX ADMIN — GADOBUTROL 6 ML: 604.72 INJECTION INTRAVENOUS at 06:14

## 2024-04-30 ENCOUNTER — TELEPHONE (OUTPATIENT)
Age: 54
End: 2024-04-30

## 2024-04-30 NOTE — TELEPHONE ENCOUNTER
Patient called to see if her MRI brain results were finalized.  I informed her they were not finalized yet, but as soon as they were and Dr. Crane received that, it would be released.  Patient would appreciate a call back to go over those results once completed.

## 2024-05-11 ENCOUNTER — APPOINTMENT (OUTPATIENT)
Age: 54
End: 2024-05-11
Payer: COMMERCIAL

## 2024-05-11 DIAGNOSIS — E78.2 MIXED HYPERLIPIDEMIA: ICD-10-CM

## 2024-05-11 LAB
CHOLEST SERPL-MCNC: 168 MG/DL
HDLC SERPL-MCNC: 56 MG/DL
LDLC SERPL CALC-MCNC: 101 MG/DL (ref 0–100)
NONHDLC SERPL-MCNC: 112 MG/DL
TRIGL SERPL-MCNC: 57 MG/DL

## 2024-05-11 PROCEDURE — 80061 LIPID PANEL: CPT

## 2024-05-14 ENCOUNTER — TELEPHONE (OUTPATIENT)
Age: 54
End: 2024-05-14

## 2024-05-14 NOTE — TELEPHONE ENCOUNTER
6m fu LMTCB to reschedule; Rosa Maria appt     Follow to [place] office; starting 7/1  Rosa Maria leaving [facility] 6/30   Does pt want to stay with [facility]    P# for [employer] is: [phone]

## 2024-05-16 DIAGNOSIS — E78.2 MIXED HYPERLIPIDEMIA: ICD-10-CM

## 2024-05-17 RX ORDER — ATORVASTATIN CALCIUM 20 MG/1
20 TABLET, FILM COATED ORAL DAILY
Qty: 90 TABLET | Refills: 1 | Status: SHIPPED | OUTPATIENT
Start: 2024-05-17

## 2024-08-13 DIAGNOSIS — B00.9 HSV (HERPES SIMPLEX VIRUS) INFECTION: ICD-10-CM

## 2024-08-14 ENCOUNTER — TELEPHONE (OUTPATIENT)
Age: 54
End: 2024-08-14

## 2024-08-14 RX ORDER — ACYCLOVIR 400 MG/1
TABLET ORAL
Qty: 180 TABLET | Refills: 1 | Status: SHIPPED | OUTPATIENT
Start: 2024-08-14 | End: 2024-08-30

## 2024-08-14 RX ORDER — ACYCLOVIR 50 MG/G
OINTMENT TOPICAL
Qty: 30 G | Refills: 0 | Status: SHIPPED | OUTPATIENT
Start: 2024-08-14

## 2024-09-13 ENCOUNTER — TELEPHONE (OUTPATIENT)
Age: 54
End: 2024-09-13

## 2024-09-24 ENCOUNTER — APPOINTMENT (OUTPATIENT)
Age: 54
End: 2024-09-24
Payer: COMMERCIAL

## 2024-09-24 ENCOUNTER — OFFICE VISIT (OUTPATIENT)
Age: 54
End: 2024-09-24
Payer: COMMERCIAL

## 2024-09-24 VITALS
TEMPERATURE: 97.3 F | WEIGHT: 124 LBS | RESPIRATION RATE: 18 BRPM | DIASTOLIC BLOOD PRESSURE: 74 MMHG | HEIGHT: 66 IN | BODY MASS INDEX: 19.93 KG/M2 | SYSTOLIC BLOOD PRESSURE: 116 MMHG | HEART RATE: 72 BPM | OXYGEN SATURATION: 95 %

## 2024-09-24 DIAGNOSIS — R63.4 UNINTENTIONAL WEIGHT LOSS: Primary | ICD-10-CM

## 2024-09-24 DIAGNOSIS — R63.4 UNINTENTIONAL WEIGHT LOSS: ICD-10-CM

## 2024-09-24 DIAGNOSIS — I51.7 LEFT ATRIAL ENLARGEMENT: ICD-10-CM

## 2024-09-24 DIAGNOSIS — R00.2 PALPITATIONS: ICD-10-CM

## 2024-09-24 DIAGNOSIS — I10 ESSENTIAL HYPERTENSION: ICD-10-CM

## 2024-09-24 DIAGNOSIS — H93.A3 PULSATILE TINNITUS OF BOTH EARS: ICD-10-CM

## 2024-09-24 LAB
ALBUMIN SERPL BCG-MCNC: 4.4 G/DL (ref 3.5–5)
ALP SERPL-CCNC: 53 U/L (ref 34–104)
ALT SERPL W P-5'-P-CCNC: 14 U/L (ref 7–52)
AMORPH URATE CRY URNS QL MICRO: ABNORMAL
ANION GAP SERPL CALCULATED.3IONS-SCNC: 9 MMOL/L (ref 4–13)
AST SERPL W P-5'-P-CCNC: 15 U/L (ref 13–39)
BACTERIA UR QL AUTO: ABNORMAL /HPF
BASOPHILS # BLD AUTO: 0.04 THOUSANDS/ΜL (ref 0–0.1)
BASOPHILS NFR BLD AUTO: 1 % (ref 0–1)
BILIRUB SERPL-MCNC: 1.32 MG/DL (ref 0.2–1)
BILIRUB UR QL STRIP: NEGATIVE
BUN SERPL-MCNC: 13 MG/DL (ref 5–25)
CALCIUM SERPL-MCNC: 9 MG/DL (ref 8.4–10.2)
CHLORIDE SERPL-SCNC: 103 MMOL/L (ref 96–108)
CLARITY UR: CLEAR
CO2 SERPL-SCNC: 29 MMOL/L (ref 21–32)
COLOR UR: ABNORMAL
CREAT SERPL-MCNC: 0.77 MG/DL (ref 0.6–1.3)
EOSINOPHIL # BLD AUTO: 0.12 THOUSAND/ΜL (ref 0–0.61)
EOSINOPHIL NFR BLD AUTO: 2 % (ref 0–6)
ERYTHROCYTE [DISTWIDTH] IN BLOOD BY AUTOMATED COUNT: 13.6 % (ref 11.6–15.1)
GFR SERPL CREATININE-BSD FRML MDRD: 87 ML/MIN/1.73SQ M
GLUCOSE P FAST SERPL-MCNC: 96 MG/DL (ref 65–99)
GLUCOSE UR STRIP-MCNC: NEGATIVE MG/DL
HCT VFR BLD AUTO: 43.4 % (ref 34.8–46.1)
HGB BLD-MCNC: 13.9 G/DL (ref 11.5–15.4)
HGB UR QL STRIP.AUTO: ABNORMAL
IMM GRANULOCYTES # BLD AUTO: 0.01 THOUSAND/UL (ref 0–0.2)
IMM GRANULOCYTES NFR BLD AUTO: 0 % (ref 0–2)
KETONES UR STRIP-MCNC: NEGATIVE MG/DL
LEUKOCYTE ESTERASE UR QL STRIP: NEGATIVE
LYMPHOCYTES # BLD AUTO: 1.36 THOUSANDS/ΜL (ref 0.6–4.47)
LYMPHOCYTES NFR BLD AUTO: 27 % (ref 14–44)
MCH RBC QN AUTO: 29.3 PG (ref 26.8–34.3)
MCHC RBC AUTO-ENTMCNC: 32 G/DL (ref 31.4–37.4)
MCV RBC AUTO: 91 FL (ref 82–98)
MONOCYTES # BLD AUTO: 0.5 THOUSAND/ΜL (ref 0.17–1.22)
MONOCYTES NFR BLD AUTO: 10 % (ref 4–12)
MUCOUS THREADS UR QL AUTO: ABNORMAL
NEUTROPHILS # BLD AUTO: 2.94 THOUSANDS/ΜL (ref 1.85–7.62)
NEUTS SEG NFR BLD AUTO: 60 % (ref 43–75)
NITRITE UR QL STRIP: NEGATIVE
NON-SQ EPI CELLS URNS QL MICRO: ABNORMAL /HPF
NRBC BLD AUTO-RTO: 0 /100 WBCS
PH UR STRIP.AUTO: 6.5 [PH]
PLATELET # BLD AUTO: 258 THOUSANDS/UL (ref 149–390)
PMV BLD AUTO: 11.3 FL (ref 8.9–12.7)
POTASSIUM SERPL-SCNC: 3.7 MMOL/L (ref 3.5–5.3)
PROT SERPL-MCNC: 7.2 G/DL (ref 6.4–8.4)
PROT UR STRIP-MCNC: ABNORMAL MG/DL
RBC # BLD AUTO: 4.75 MILLION/UL (ref 3.81–5.12)
RBC #/AREA URNS AUTO: ABNORMAL /HPF
SODIUM SERPL-SCNC: 141 MMOL/L (ref 135–147)
SP GR UR STRIP.AUTO: 1.02 (ref 1–1.03)
TSH SERPL DL<=0.05 MIU/L-ACNC: 0.88 UIU/ML (ref 0.45–4.5)
UROBILINOGEN UR STRIP-ACNC: <2 MG/DL
WBC # BLD AUTO: 4.97 THOUSAND/UL (ref 4.31–10.16)
WBC #/AREA URNS AUTO: ABNORMAL /HPF

## 2024-09-24 PROCEDURE — 84443 ASSAY THYROID STIM HORMONE: CPT

## 2024-09-24 PROCEDURE — 80053 COMPREHEN METABOLIC PANEL: CPT

## 2024-09-24 PROCEDURE — 36415 COLL VENOUS BLD VENIPUNCTURE: CPT

## 2024-09-24 PROCEDURE — 85025 COMPLETE CBC W/AUTO DIFF WBC: CPT

## 2024-09-24 PROCEDURE — 99214 OFFICE O/P EST MOD 30 MIN: CPT | Performed by: STUDENT IN AN ORGANIZED HEALTH CARE EDUCATION/TRAINING PROGRAM

## 2024-09-24 PROCEDURE — 93000 ELECTROCARDIOGRAM COMPLETE: CPT | Performed by: STUDENT IN AN ORGANIZED HEALTH CARE EDUCATION/TRAINING PROGRAM

## 2024-09-24 PROCEDURE — 81001 URINALYSIS AUTO W/SCOPE: CPT

## 2024-09-24 NOTE — ASSESSMENT & PLAN NOTE
Patient had negative MRI. She saw ENT who recommended procedure for further evaluation but she declined. Was unable to check patient's ears today due to malfunction of otoscope.

## 2024-09-24 NOTE — PROGRESS NOTES
Ambulatory Visit  Name: Christina Andrade      : 1970      MRN: [id number]  Encounter Provider: Og Barker MD  Encounter Date: 2024   Encounter department: [facility] PRIMARY CARE [place]    Assessment & Plan  Unintentional weight loss  35 lb weight loss over last 5-6 months. Other symptoms including hair falling out, palpitations, and variable appetite. She is UTD on cervical cancer screening and colonoscopy. Overdue for mammogram.  Check labs with special attention to TSH  Mammogram ordered    Orders:    TSH, 3rd generation with Free T4 reflex; Future    Comprehensive metabolic panel; Future    CBC and differential; Future    UA w Reflex to Microscopic w Reflex to Culture; Future    Palpitations  Transient, at rest, self-resolving, occurring three times a week. POCT ECG showing sinus bradycardia 55 with left atrial enlargement given P mitrale. Will check TSH and echo. Patient had negative holter monitor but not since 2018.    Orders:    POCT ECG    Echo complete w/ contrast if indicated; Future    Essential hypertension  BP well controlled. Continue amlodipine.       Pulsatile tinnitus of both ears  Patient had negative MRI. She saw ENT who recommended procedure for further evaluation but she declined. Was unable to check patient's ears today due to malfunction of otoscope.       Left atrial enlargement  As above under palpitations.  Orders:    Echo complete w/ contrast if indicated; Future       History of Present Illness     Christina Andrade is a 53 yo F with PMH of PTSD, anxiety, HTN, and HLD who presents today to establish care. She has chronic history of tinnitus. She had negative MRI brain and went for ENT referral. They offered her procedure for further evaluation but she declined. She reports history of palpitations. She had holter monitor in 2018 that showed minimal ectopic activity. She reports more recently about 3 times a week she is getting transient palpitations at rest. Some SOB  but otherwise no headache, dizziness, or chest pain. She reports since April she has lost 35 lbs unintentionally. She reports variable appetite her whole life. She reports hair falling out and hot flashes that she attributes to perimenopausal state. No recent change in her stress level.          Review of Systems   Constitutional:  Positive for diaphoresis and unexpected weight change. Negative for appetite change, fatigue and fever.   HENT:  Positive for congestion and tinnitus. Negative for hearing loss.    Respiratory:  Positive for shortness of breath. Negative for cough.    Cardiovascular:  Positive for palpitations. Negative for chest pain and leg swelling.   Gastrointestinal:  Negative for abdominal pain, blood in stool, constipation and diarrhea.   Neurological:  Negative for dizziness, light-headedness and headaches.   Psychiatric/Behavioral:  Negative for dysphoric mood. The patient is not nervous/anxious.      Medical History Reviewed by provider this encounter:  Meds       Past Medical History   Past Medical History:   Diagnosis Date    Abnormal Pap smear of cervix     Acute low back pain 2021    Anxiety     Bradycardia     Colon polyp     Depression     Gastric ulcer     Hernia 2009    Hypertension 2006    Lactose intolerance     MDD (major depressive disorder)     PTSD (post-traumatic stress disorder)      Past Surgical History:   Procedure Laterality Date    BREAST BIOPSY  1987    BREAST CYST EXCISION Left 1984    benign    CERVICAL BIOPSY  W/ LOOP ELECTRODE EXCISION       SECTION  2013    HYSTERECTOMY  2008    Partial    OOPHORECTOMY Bilateral 2015    PARTIAL HYSTERECTOMY  2014    supracervical hysterectomy     Family History   Problem Relation Age of Onset    Diabetes Mother     Lung cancer Father         non smoker    Colon polyps Father     Cancer Father     Stroke Sister     Sarcoidosis Sister     Other Sister         Hydrocephalus    No Known Problems Brother     Pancreatic  cancer Maternal Grandmother     Heart disease Paternal Grandmother     Hypertension Paternal Grandmother     Breast cancer Maternal Aunt 45    Uterine cancer Maternal Aunt     Breast cancer Maternal Aunt 52    Colon cancer Neg Hx     Ovarian cancer Neg Hx     Cervical cancer Neg Hx      Current Outpatient Medications on File Prior to Visit   Medication Sig Dispense Refill    acyclovir (ZOVIRAX) 400 MG tablet TAKE 1 TABLET BY MOUTH TWICE A DAY (Patient taking differently: if needed TAKE 1 TABLET BY MOUTH TWICE A DAY) 180 tablet 1    acyclovir (ZOVIRAX) 5 % ointment APPLY TOPICALLY 4 TIMES A DAY AS NEEDED. 30 g 0    amLODIPine (NORVASC) 5 mg tablet Take 5 mg by mouth daily Patient states she is weaning off  0    atorvastatin (LIPITOR) 20 mg tablet Take 1 tablet (20 mg total) by mouth daily 90 tablet 1    clonazePAM (KlonoPIN) 1 mg tablet Take 1 mg by mouth daily Taking half pills a day  0    NON FORMULARY Medical Marijuana      traZODone (DESYREL) 100 mg tablet Take 50 mg by mouth daily at bedtime as needed         No current facility-administered medications on file prior to visit.     Allergies   Allergen Reactions    Lisinopril Angioedema      Current Outpatient Medications on File Prior to Visit   Medication Sig Dispense Refill    acyclovir (ZOVIRAX) 400 MG tablet TAKE 1 TABLET BY MOUTH TWICE A DAY (Patient taking differently: if needed TAKE 1 TABLET BY MOUTH TWICE A DAY) 180 tablet 1    acyclovir (ZOVIRAX) 5 % ointment APPLY TOPICALLY 4 TIMES A DAY AS NEEDED. 30 g 0    amLODIPine (NORVASC) 5 mg tablet Take 5 mg by mouth daily Patient states she is weaning off  0    atorvastatin (LIPITOR) 20 mg tablet Take 1 tablet (20 mg total) by mouth daily 90 tablet 1    clonazePAM (KlonoPIN) 1 mg tablet Take 1 mg by mouth daily Taking half pills a day  0    NON FORMULARY Medical Marijuana      traZODone (DESYREL) 100 mg tablet Take 50 mg by mouth daily at bedtime as needed         No current facility-administered medications  "on file prior to visit.      Social History     Tobacco Use    Smoking status: Never    Smokeless tobacco: Never    Tobacco comments:     Medical marijuana   Vaping Use    Vaping status: Never Used   Substance and Sexual Activity    Alcohol use: Yes     Alcohol/week: 1.0 standard drink of alcohol     Types: 1 Glasses of wine per week    Drug use: Yes     Types: Marijuana     Comment: Medical Marijuana Card    Sexual activity: Yes     Partners: Male     Birth control/protection: Surgical, Female Sterilization         Objective     /74 (BP Location: Left arm, Patient Position: Sitting, Cuff Size: Standard)   Pulse 72   Temp (!) 97.3 °F (36.3 °C) (Tympanic)   Resp 18   Ht 5' 6\" (1.676 m)   Wt 56.2 kg (124 lb)   SpO2 95%   BMI 20.01 kg/m²     Physical Exam  Constitutional:       General: She is not in acute distress.  Eyes:      Conjunctiva/sclera: Conjunctivae normal.   Neck:      Thyroid: No thyroid mass or thyroid tenderness.   Cardiovascular:      Rate and Rhythm: Normal rate and regular rhythm.      Heart sounds: Normal heart sounds.   Pulmonary:      Effort: Pulmonary effort is normal.      Breath sounds: Normal breath sounds.   Abdominal:      General: There is no distension.      Tenderness: There is no abdominal tenderness.   Musculoskeletal:      Right lower leg: No edema.      Left lower leg: No edema.   Skin:     General: Skin is warm and dry.   Neurological:      Mental Status: She is alert.   Psychiatric:         Speech: Speech normal.         Behavior: Behavior normal. Behavior is cooperative.         "

## 2024-09-25 ENCOUNTER — TELEPHONE (OUTPATIENT)
Age: 54
End: 2024-09-25

## 2024-09-25 DIAGNOSIS — R31.29 MICROSCOPIC HEMATURIA: Primary | ICD-10-CM

## 2024-09-25 NOTE — TELEPHONE ENCOUNTER
Left voice message asking pt to return call; no detailed info given  Re:  lab results/repeat U/A prior to next months office visit

## 2024-09-25 NOTE — TELEPHONE ENCOUNTER
----- Message from Og Barker MD sent at 9/25/2024  9:39 AM EDT -----  Please let patient know that there is nothing too significant on her labs that jumps out at me at this time. Her bilirubin was slightly elevated but this was the case in the past as well. In the absence of other abnormalities in liver tests, this is   usually suggestive of a benign condition called Gilbert's syndrome that has no symptoms or long-term effects. There was a small amount of protein in her urine which may be due to mild dehydration or her HTN. There was a small amount of blood in her u  rine. We will repeat urine test for her to get prior to when she comes in next month to see if it shows up again.

## 2024-09-30 ENCOUNTER — TELEPHONE (OUTPATIENT)
Age: 54
End: 2024-09-30

## 2024-09-30 NOTE — TELEPHONE ENCOUNTER
Patient called requesting refill for   acyclovir (ZOVIRAX) 400 MG tablet . Patient made aware medication was refilled on 8/14/2024 for 180 with 1 refills to [id number] pharmacy. Patient instructed to contact the pharmacy to obtain refills of medication. Patient verbalized understanding.       Patient called requesting refill for   acyclovir (ZOVIRAX) 5 % ointment . Patient made aware medication was refilled on 8/14/2024 for 30 gm with 0 refills to [place] [address] pharmacy. Patient instructed to contact the pharmacy to obtain refills of medication. Patient verbalized understanding.

## 2024-10-28 ENCOUNTER — TELEPHONE (OUTPATIENT)
Age: 54
End: 2024-10-28

## 2024-10-28 ENCOUNTER — HOSPITAL ENCOUNTER (OUTPATIENT)
Dept: NON INVASIVE DIAGNOSTICS | Facility: CLINIC | Age: 54
Discharge: HOME | End: 2024-10-28
Payer: COMMERCIAL

## 2024-10-28 VITALS
WEIGHT: 124 LBS | HEART RATE: 56 BPM | HEIGHT: 66 IN | SYSTOLIC BLOOD PRESSURE: 116 MMHG | DIASTOLIC BLOOD PRESSURE: 74 MMHG | BODY MASS INDEX: 19.93 KG/M2

## 2024-10-28 DIAGNOSIS — R00.2 PALPITATIONS: ICD-10-CM

## 2024-10-28 DIAGNOSIS — I51.7 LEFT ATRIAL ENLARGEMENT: ICD-10-CM

## 2024-10-28 LAB
AORTIC ROOT: 3.3 CM
APICAL FOUR CHAMBER EJECTION FRACTION: 72 %
ASCENDING AORTA: 3.4 CM
BSA FOR ECHO PROCEDURE: 1.63 M2
E WAVE DECELERATION TIME: 199 MS
E/A RATIO: 1.45
FRACTIONAL SHORTENING: 36 (ref 28–44)
INTERVENTRICULAR SEPTUM IN DIASTOLE (PARASTERNAL SHORT AXIS VIEW): 0.8 CM
INTERVENTRICULAR SEPTUM: 0.8 CM (ref 0.6–1.1)
LAAS-AP2: 10.1 CM2
LAAS-AP4: 9.5 CM2
LEFT ATRIUM SIZE: 2.4 CM
LEFT ATRIUM VOLUME (MOD BIPLANE): 22 ML
LEFT ATRIUM VOLUME INDEX (MOD BIPLANE): 13.5 ML/M2
LEFT INTERNAL DIMENSION IN SYSTOLE: 2.5 CM (ref 2.1–4)
LEFT VENTRICULAR INTERNAL DIMENSION IN DIASTOLE: 3.9 CM (ref 3.5–6)
LEFT VENTRICULAR POSTERIOR WALL IN END DIASTOLE: 0.8 CM
LEFT VENTRICULAR STROKE VOLUME: 43 ML
LVSV (TEICH): 43 ML
MV E'TISSUE VEL-SEP: 10 CM/S
MV PEAK A VEL: 0.65 M/S
MV PEAK E VEL: 94 CM/S
MV STENOSIS PRESSURE HALF TIME: 58 MS
MV VALVE AREA P 1/2 METHOD: 3.79
RIGHT ATRIUM AREA SYSTOLE A4C: 7.5 CM2
RIGHT VENTRICLE ID DIMENSION: 3.2 CM
SL CV LEFT ATRIUM LENGTH A2C: 3.8 CM
SL CV LV EF: 65
SL CV PED ECHO LEFT VENTRICLE DIASTOLIC VOLUME (MOD BIPLANE) 2D: 66 ML
SL CV PED ECHO LEFT VENTRICLE SYSTOLIC VOLUME (MOD BIPLANE) 2D: 23 ML
TR MAX PG: 19 MMHG
TR PEAK VELOCITY: 2.2 M/S
TRICUSPID ANNULAR PLANE SYSTOLIC EXCURSION: 2.3 CM
TRICUSPID VALVE PEAK REGURGITATION VELOCITY: 2.2 M/S

## 2024-10-28 PROCEDURE — 93306 TTE W/DOPPLER COMPLETE: CPT

## 2024-10-28 PROCEDURE — 93306 TTE W/DOPPLER COMPLETE: CPT | Performed by: INTERNAL MEDICINE

## 2024-10-28 NOTE — TELEPHONE ENCOUNTER
----- Message from Og Barekr MD sent at 10/28/2024  3:25 PM EDT -----  Please let patient know echo showed normal heart function

## 2025-01-02 DIAGNOSIS — E78.2 MIXED HYPERLIPIDEMIA: ICD-10-CM

## 2025-01-03 RX ORDER — ATORVASTATIN CALCIUM 20 MG/1
20 TABLET, FILM COATED ORAL DAILY
Qty: 90 TABLET | Refills: 1 | Status: SHIPPED | OUTPATIENT
Start: 2025-01-03

## 2025-01-08 DIAGNOSIS — B00.9 HSV (HERPES SIMPLEX VIRUS) INFECTION: ICD-10-CM

## 2025-01-09 RX ORDER — ACYCLOVIR 50 MG/G
OINTMENT TOPICAL
Qty: 30 G | Refills: 0 | OUTPATIENT
Start: 2025-01-09

## 2025-03-29 DIAGNOSIS — B00.9 HSV (HERPES SIMPLEX VIRUS) INFECTION: ICD-10-CM

## 2025-03-31 NOTE — TELEPHONE ENCOUNTER
Left VM for patient to call back to schedule annual exam before medication is refilled. Patient's last annual exam was on 02/07/2023 with Dr. Laguerre.

## 2025-04-02 NOTE — TELEPHONE ENCOUNTER
[employer] message sent to patient to call the office to schedule annual exam before refilling medication. (2nd attempt)

## 2025-04-03 RX ORDER — ACYCLOVIR 400 MG/1
400 TABLET ORAL 2 TIMES DAILY
Qty: 180 TABLET | Refills: 1 | OUTPATIENT
Start: 2025-04-03

## 2025-04-15 ENCOUNTER — APPOINTMENT (OUTPATIENT)
Age: 55
End: 2025-04-15
Attending: STUDENT IN AN ORGANIZED HEALTH CARE EDUCATION/TRAINING PROGRAM
Payer: COMMERCIAL

## 2025-04-15 ENCOUNTER — OFFICE VISIT (OUTPATIENT)
Age: 55
End: 2025-04-15
Payer: COMMERCIAL

## 2025-04-15 VITALS
DIASTOLIC BLOOD PRESSURE: 70 MMHG | SYSTOLIC BLOOD PRESSURE: 110 MMHG | OXYGEN SATURATION: 99 % | BODY MASS INDEX: 19.29 KG/M2 | WEIGHT: 120 LBS | HEART RATE: 56 BPM | TEMPERATURE: 94.4 F | HEIGHT: 66 IN

## 2025-04-15 DIAGNOSIS — E78.2 MIXED HYPERLIPIDEMIA: ICD-10-CM

## 2025-04-15 DIAGNOSIS — Z12.31 ENCOUNTER FOR SCREENING MAMMOGRAM FOR BREAST CANCER: ICD-10-CM

## 2025-04-15 DIAGNOSIS — R63.4 UNINTENTIONAL WEIGHT LOSS: ICD-10-CM

## 2025-04-15 DIAGNOSIS — I10 ESSENTIAL HYPERTENSION: ICD-10-CM

## 2025-04-15 DIAGNOSIS — R31.29 MICROSCOPIC HEMATURIA: ICD-10-CM

## 2025-04-15 DIAGNOSIS — H93.A3 PULSATILE TINNITUS OF BOTH EARS: ICD-10-CM

## 2025-04-15 DIAGNOSIS — F41.9 ANXIETY: ICD-10-CM

## 2025-04-15 DIAGNOSIS — F43.10 PTSD (POST-TRAUMATIC STRESS DISORDER): ICD-10-CM

## 2025-04-15 DIAGNOSIS — Z00.00 ANNUAL PHYSICAL EXAM: Primary | ICD-10-CM

## 2025-04-15 LAB
ALBUMIN SERPL BCG-MCNC: 4.6 G/DL (ref 3.5–5)
ALP SERPL-CCNC: 61 U/L (ref 34–104)
ALT SERPL W P-5'-P-CCNC: 12 U/L (ref 7–52)
AMORPH URATE CRY URNS QL MICRO: NORMAL
ANION GAP SERPL CALCULATED.3IONS-SCNC: 8 MMOL/L (ref 4–13)
AST SERPL W P-5'-P-CCNC: 16 U/L (ref 13–39)
BACTERIA UR QL AUTO: NORMAL /HPF
BASOPHILS # BLD AUTO: 0.03 THOUSANDS/ÂΜL (ref 0–0.1)
BASOPHILS NFR BLD AUTO: 1 % (ref 0–1)
BILIRUB SERPL-MCNC: 1.04 MG/DL (ref 0.2–1)
BILIRUB UR QL STRIP: NEGATIVE
BUN SERPL-MCNC: 16 MG/DL (ref 5–25)
CALCIUM SERPL-MCNC: 9.3 MG/DL (ref 8.4–10.2)
CHLORIDE SERPL-SCNC: 102 MMOL/L (ref 96–108)
CHOLEST SERPL-MCNC: 251 MG/DL (ref ?–200)
CLARITY UR: ABNORMAL
CO2 SERPL-SCNC: 30 MMOL/L (ref 21–32)
COLOR UR: ABNORMAL
CREAT SERPL-MCNC: 0.77 MG/DL (ref 0.6–1.3)
EOSINOPHIL # BLD AUTO: 0.11 THOUSAND/ÂΜL (ref 0–0.61)
EOSINOPHIL NFR BLD AUTO: 2 % (ref 0–6)
ERYTHROCYTE [DISTWIDTH] IN BLOOD BY AUTOMATED COUNT: 13.7 % (ref 11.6–15.1)
GFR SERPL CREATININE-BSD FRML MDRD: 87 ML/MIN/1.73SQ M
GLUCOSE P FAST SERPL-MCNC: 90 MG/DL (ref 65–99)
GLUCOSE UR STRIP-MCNC: NEGATIVE MG/DL
HCT VFR BLD AUTO: 41.5 % (ref 34.8–46.1)
HDLC SERPL-MCNC: 60 MG/DL
HGB BLD-MCNC: 13.6 G/DL (ref 11.5–15.4)
HGB UR QL STRIP.AUTO: ABNORMAL
IMM GRANULOCYTES # BLD AUTO: 0.01 THOUSAND/UL (ref 0–0.2)
IMM GRANULOCYTES NFR BLD AUTO: 0 % (ref 0–2)
KETONES UR STRIP-MCNC: NEGATIVE MG/DL
LDLC SERPL CALC-MCNC: 178 MG/DL (ref 0–100)
LEUKOCYTE ESTERASE UR QL STRIP: NEGATIVE
LYMPHOCYTES # BLD AUTO: 1.77 THOUSANDS/ÂΜL (ref 0.6–4.47)
LYMPHOCYTES NFR BLD AUTO: 35 % (ref 14–44)
MCH RBC QN AUTO: 29.5 PG (ref 26.8–34.3)
MCHC RBC AUTO-ENTMCNC: 32.8 G/DL (ref 31.4–37.4)
MCV RBC AUTO: 90 FL (ref 82–98)
MONOCYTES # BLD AUTO: 0.38 THOUSAND/ÂΜL (ref 0.17–1.22)
MONOCYTES NFR BLD AUTO: 8 % (ref 4–12)
NEUTROPHILS # BLD AUTO: 2.8 THOUSANDS/ÂΜL (ref 1.85–7.62)
NEUTS SEG NFR BLD AUTO: 54 % (ref 43–75)
NITRITE UR QL STRIP: NEGATIVE
NON-SQ EPI CELLS URNS QL MICRO: NORMAL /HPF
NONHDLC SERPL-MCNC: 191 MG/DL
NRBC BLD AUTO-RTO: 0 /100 WBCS
PH UR STRIP.AUTO: 8 [PH]
PLATELET # BLD AUTO: 239 THOUSANDS/UL (ref 149–390)
PMV BLD AUTO: 11.3 FL (ref 8.9–12.7)
POTASSIUM SERPL-SCNC: 4.2 MMOL/L (ref 3.5–5.3)
PROT SERPL-MCNC: 7.1 G/DL (ref 6.4–8.4)
PROT UR STRIP-MCNC: NEGATIVE MG/DL
RBC # BLD AUTO: 4.61 MILLION/UL (ref 3.81–5.12)
RBC #/AREA URNS AUTO: NORMAL /HPF
SODIUM SERPL-SCNC: 140 MMOL/L (ref 135–147)
SP GR UR STRIP.AUTO: 1.01 (ref 1–1.03)
TRIGL SERPL-MCNC: 63 MG/DL (ref ?–150)
TSH SERPL DL<=0.05 MIU/L-ACNC: 1.23 UIU/ML (ref 0.45–4.5)
UROBILINOGEN UR STRIP-ACNC: <2 MG/DL
WBC # BLD AUTO: 5.1 THOUSAND/UL (ref 4.31–10.16)
WBC #/AREA URNS AUTO: NORMAL /HPF

## 2025-04-15 PROCEDURE — 36415 COLL VENOUS BLD VENIPUNCTURE: CPT

## 2025-04-15 PROCEDURE — 81001 URINALYSIS AUTO W/SCOPE: CPT

## 2025-04-15 PROCEDURE — 80061 LIPID PANEL: CPT

## 2025-04-15 PROCEDURE — 84443 ASSAY THYROID STIM HORMONE: CPT

## 2025-04-15 PROCEDURE — 85025 COMPLETE CBC W/AUTO DIFF WBC: CPT

## 2025-04-15 PROCEDURE — 84166 PROTEIN E-PHORESIS/URINE/CSF: CPT

## 2025-04-15 PROCEDURE — 99396 PREV VISIT EST AGE 40-64: CPT | Performed by: STUDENT IN AN ORGANIZED HEALTH CARE EDUCATION/TRAINING PROGRAM

## 2025-04-15 PROCEDURE — 80053 COMPREHEN METABOLIC PANEL: CPT

## 2025-04-15 PROCEDURE — 84165 PROTEIN E-PHORESIS SERUM: CPT

## 2025-04-15 RX ORDER — DRONABINOL 2.5 MG/1
2.5 CAPSULE ORAL
Qty: 60 CAPSULE | Refills: 0 | Status: SHIPPED | OUTPATIENT
Start: 2025-04-15 | End: 2025-04-16 | Stop reason: SDUPTHER

## 2025-04-15 NOTE — ASSESSMENT & PLAN NOTE
Following with Psychiatry. On trazodone and Klonopin. Will start marinol for appetite stimulation, as recommended by Psychiatrist.    Orders:    dronabinol (MARINOL) 2.5 mg capsule; Take 1 capsule (2.5 mg total) by mouth 2 (two) times a day before meals

## 2025-04-15 NOTE — PROGRESS NOTES
Adult Annual Physical  Name: Christina Andrade      : 1970      MRN: [id number]  Encounter Provider: Og Barker MD  Encounter Date: 4/15/2025   Encounter department: [facility] CARE [place]    :  Assessment & Plan  Annual physical exam  Immunizations: Declines vaccines; counseled on shingles vaccine  Labs: CBC, CMP, lipids, TSH, SPEP, UPEP  Screening: Mammogram ordered; colonoscopy due         Unintentional weight loss  Continuing to lose weight 2/2 to poor appetite. 35+ lb weight loss since last February. She has had an episode like this in the past, thought to be secondary to underlying psychiatric conditions. She was put on Marinol my previous PCP, with good success. She follows with Psychiatry and they recommend that she be started on it again.  Will check CBC, CMP, TSH, SPEP, UPEP and CT CAP to rule out other organic causes of weight loss  Will start marinol 2.5 mg BID  Continue follow up with PSychiatry    Orders:    dronabinol (MARINOL) 2.5 mg capsule; Take 1 capsule (2.5 mg total) by mouth 2 (two) times a day before meals    CT chest abdomen pelvis w contrast; Future    Ambulatory Referral to Gastroenterology; Future    CBC and differential; Future    Comprehensive metabolic panel; Future    TSH, 3rd generation with Free T4 reflex; Future    Protein electrophoresis, serum; Future    Protein electrophoresis, urine; Future    PTSD (post-traumatic stress disorder)  Following with Psychiatry. On trazodone and Klonopin.       Anxiety  Following with Psychiatry. On trazodone and Klonopin. Will start marinol for appetite stimulation, as recommended by Psychiatrist.    Orders:    dronabinol (MARINOL) 2.5 mg capsule; Take 1 capsule (2.5 mg total) by mouth 2 (two) times a day before meals    Essential hypertension  BP stable today. Patient now off of amlodipine. Continue to monitor off antihypertensives.       Mixed hyperlipidemia  Continue atorvastatin 20 mg qd. Recheck lipid  panel.    Orders:    Lipid panel; Future    Pulsatile tinnitus of both ears  Has had MRI and ENT consultation without discovered source. She was recommended another procedure by ENT for further evaluation but she had declined. She is interested in seeing ENT again.    Orders:    Ambulatory Referral to Otolaryngology; Future    Encounter for screening mammogram for breast cancer    Orders:    Mammo screening bilateral w 3d and cad; Future        Preventive Screenings:  - Diabetes Screening: screening up-to-date  - Cholesterol Screening: screening not indicated and has hyperlipidemia   - Hepatitis C screening: screening up-to-date   - HIV screening: screening up-to-date   - Cervical cancer screening: screening up-to-date   - Breast cancer screening: orders placed   - Colon cancer screening: screening up-to-date   - Lung cancer screening: screening not indicated     Immunizations:  - Immunizations due: Influenza and Zoster (Shingrix)  - Risks/benefits immunizations discussed    - The patient declines recommended vaccines currently despite my recommendations      Counseling/Anticipatory Guidance:    - Dental health: discussed importance of regular tooth brushing, flossing, and dental visits.          History of Present Illness     Adult Annual Physical:  Patient presents for annual physical. Christina Andrade I a 55 yo F with PMH of PTSD, anxiety, HLD, and HTN who presents today for annual physical. She is continuing to lose weight unintentionally. She is down 35+ lbs since last February. She reports she has no appetite. She denies abdominal pain, changes in stool habits, blood in stool, or early satiety. She was previously on Marinol as this has happened to her in the past and she has had good success with it, thought to be secondary to underlying psychiatric conditions. She follows with Psychiatrist who recommends starting it again through PCP. She does not wish to start any new psychiatric medications..     Diet and  Physical Activity:  - Diet/Nutrition:. no appetite  - Exercise: walking, 3-4 times a week on average and 30-60 minutes on average.    General Health:  - Sleep: 4-6 hours of sleep on average and sleeps well.  - Hearing: tinnitus and normal hearing bilateral ears.  - Vision: wears glasses and most recent eye exam < 1 year ago.  - Dental: regular dental visits, brushes teeth twice daily and floss regularly.    /GYN Health:  - Follows with GYN: yes.   - Menopause: postmenopausal.   - Last menstrual cycle: 2/1/2016.   - History of STDs: yes  - Contraception: hysterectomy.      Advanced Care Planning:  - Has an advanced directive?: yes    - Has a durable medical POA?: no      Review of Systems   Constitutional:  Positive for appetite change and unexpected weight change. Negative for chills and fever.   HENT:  Negative for congestion and sore throat.    Eyes:  Negative for visual disturbance.   Respiratory:  Negative for cough and shortness of breath.    Cardiovascular:  Negative for chest pain and leg swelling.   Gastrointestinal:  Negative for abdominal pain, constipation, diarrhea and nausea.   Genitourinary:  Negative for difficulty urinating and dysuria.   Musculoskeletal:  Negative for arthralgias and myalgias.   Skin:  Negative for rash.   Neurological:  Negative for dizziness, light-headedness and headaches.   Psychiatric/Behavioral:  Negative for confusion. The patient is nervous/anxious.      Medical History Reviewed by provider this encounter:  Meds  Problems     .  Past Medical History   Past Medical History:   Diagnosis Date    Abnormal Pap smear of cervix     Acute low back pain 09/30/2021    Anxiety     Bradycardia     Colon polyp     Depression     Gastric ulcer     Hernia 2009    Hypertension 2006    Lactose intolerance 2003    MDD (major depressive disorder)     PTSD (post-traumatic stress disorder)      Past Surgical History:   Procedure Laterality Date    BREAST BIOPSY  1987    BREAST CYST EXCISION  Left 1984    benign    CERVICAL BIOPSY  W/ LOOP ELECTRODE EXCISION       SECTION  2013    HYSTERECTOMY  2008    Partial    OOPHORECTOMY Bilateral 2015    PARTIAL HYSTERECTOMY  2014    supracervical hysterectomy     Family History   Problem Relation Age of Onset    Diabetes Mother     Lung cancer Father         non smoker    Colon polyps Father     Cancer Father     Stroke Sister     Sarcoidosis Sister     Other Sister         Hydrocephalus    No Known Problems Brother     Pancreatic cancer Maternal Grandmother     Heart disease Paternal Grandmother     Hypertension Paternal Grandmother     Breast cancer Maternal Aunt 45    Uterine cancer Maternal Aunt     Breast cancer Maternal Aunt 52    Colon cancer Neg Hx     Ovarian cancer Neg Hx     Cervical cancer Neg Hx       reports that she has never smoked. She has never used smokeless tobacco. She reports current alcohol use of about 1.0 standard drink of alcohol per week. She reports current drug use. Drug: Marijuana.  Current Outpatient Medications   Medication Instructions    acyclovir (ZOVIRAX) 400 MG tablet TAKE 1 TABLET BY MOUTH TWICE A DAY    acyclovir (ZOVIRAX) 5 % ointment APPLY TOPICALLY 4 TIMES A DAY AS NEEDED.    atorvastatin (LIPITOR) 20 mg, Oral, Daily    clonazePAM (KLONOPIN) 1 mg, Daily    dronabinol (MARINOL) 2.5 mg, Oral, 2 times daily before meals    NON FORMULARY Medical Marijuana    traZODone (DESYREL) 50 mg, Daily at bedtime PRN     Allergies   Allergen Reactions    Lisinopril Angioedema      Current Outpatient Medications on File Prior to Visit   Medication Sig Dispense Refill    acyclovir (ZOVIRAX) 400 MG tablet TAKE 1 TABLET BY MOUTH TWICE A  tablet 1    acyclovir (ZOVIRAX) 5 % ointment APPLY TOPICALLY 4 TIMES A DAY AS NEEDED. 30 g 0    atorvastatin (LIPITOR) 20 mg tablet TAKE 1 TABLET BY MOUTH EVERY DAY 90 tablet 1    clonazePAM (KlonoPIN) 1 mg tablet Take 1 mg by mouth daily Taking half pills a day  0    NON FORMULARY Medical  "Marijuana      traZODone (DESYREL) 100 mg tablet Take 50 mg by mouth daily at bedtime as needed        [DISCONTINUED] amLODIPine (NORVASC) 5 mg tablet Take 5 mg by mouth daily Patient states she is weaning off  0     No current facility-administered medications on file prior to visit.      Social History     Tobacco Use    Smoking status: Never    Smokeless tobacco: Never    Tobacco comments:     Medical marijuana   Vaping Use    Vaping status: Never Used   Substance and Sexual Activity    Alcohol use: Yes     Alcohol/week: 1.0 standard drink of alcohol     Types: 1 Glasses of wine per week    Drug use: Yes     Types: Marijuana     Comment: Medical Marijuana Card    Sexual activity: Yes     Partners: Male     Birth control/protection: Surgical, Female Sterilization       Objective   /70 (Patient Position: Sitting, Cuff Size: Standard)   Pulse 56   Temp (!) 94.4 °F (34.7 °C)   Ht 5' 6\" (1.676 m)   Wt 54.4 kg (120 lb)   LMP 02/01/2016   SpO2 99%   BMI 19.37 kg/m²     Physical Exam  Constitutional:       General: She is not in acute distress.  HENT:      Right Ear: Tympanic membrane, ear canal and external ear normal.      Left Ear: Tympanic membrane, ear canal and external ear normal.      Nose: Nose normal.      Mouth/Throat:      Mouth: Mucous membranes are moist.      Pharynx: Oropharynx is clear.   Eyes:      Conjunctiva/sclera: Conjunctivae normal.      Pupils: Pupils are equal, round, and reactive to light.   Neck:      Thyroid: No thyroid mass or thyroid tenderness.   Cardiovascular:      Rate and Rhythm: Normal rate and regular rhythm.      Heart sounds: Normal heart sounds.   Pulmonary:      Effort: Pulmonary effort is normal.      Breath sounds: Normal breath sounds.   Abdominal:      General: There is no distension.      Tenderness: There is no abdominal tenderness.   Musculoskeletal:      Cervical back: Neck supple.      Right lower leg: No edema.      Left lower leg: No edema.   Skin:     " General: Skin is warm and dry.   Neurological:      Mental Status: She is alert.      Sensory: Sensation is intact.      Motor: Motor function is intact.   Psychiatric:         Mood and Affect: Mood normal.         Speech: Speech normal.         Behavior: Behavior normal. Behavior is cooperative.

## 2025-04-15 NOTE — PATIENT INSTRUCTIONS
"Central scheduling     Patient Education     Routine physical for adults   The Basics   Written by the doctors and editors at [facility]   What is a physical? -- A physical is a routine visit, or \"check-up,\" with your doctor. You might also hear it called a \"wellness visit\" or \"preventive visit.\"  During each visit, the doctor will:   Ask about your physical and mental health   Ask about your habits, behaviors, and lifestyle   Do an exam   Give you vaccines if needed   Talk to you about any medicines you take   Give advice about your health   Answer your questions  Getting regular check-ups is an important part of taking care of your health. It can help your doctor find and treat any problems you have. But it's also important for preventing health problems.  A routine physical is different from a \"sick visit.\" A sick visit is when you see a doctor because of a health concern or problem. Since physicals are scheduled ahead of time, you can think about what you want to ask the doctor.  How often should I get a physical? -- It depends on your age and health. In general, for people age 21 years and older:   If you are younger than 50 years, you might be able to get a physical every 3 years.   If you are 50 years or older, your doctor might recommend a physical every year.  If you have an ongoing health condition, like diabetes or high blood pressure, your doctor will probably want to see you more often.  What happens during a physical? -- In general, each visit will include:   Physical exam - The doctor or nurse will check your height, weight, heart rate, and blood pressure. They will also look at your eyes and ears. They will ask about how you are feeling and whether you have any symptoms that bother you.   Medicines - It's a good idea to bring a list of all the medicines you take to each doctor visit. Your doctor will talk to you about your medicines and answer any questions. Tell them if you are having any " "side effects that bother you. You should also tell them if you are having trouble paying for any of your medicines.   Habits and behaviors - This includes:   Your diet   Your exercise habits   Whether you smoke, drink alcohol, or use drugs   Whether you are sexually active   Whether you feel safe at home  Your doctor will talk to you about things you can do to improve your health and lower your risk of health problems. They will also offer help and support. For example, if you want to quit smoking, they can give you advice and might prescribe medicines. If you want to improve your diet or get more physical activity, they can help you with this, too.   Lab tests, if needed - The tests you get will depend on your age and situation. For example, your doctor might want to check your:   Cholesterol   Blood sugar   Iron level   Vaccines - The recommended vaccines will depend on your age, health, and what vaccines you already had. Vaccines are very important because they can prevent certain serious or deadly infections.   Discussion of screening - \"Screening\" means checking for diseases or other health problems before they cause symptoms. Your doctor can recommend screening based on your age, risk, and preferences. This might include tests to check for:   Cancer, such as breast, prostate, cervical, ovarian, colorectal, prostate, lung, or skin cancer   Sexually transmitted infections, such as chlamydia and gonorrhea   Mental health conditions like depression and anxiety  Your doctor will talk to you about the different types of screening tests. They can help you decide which screenings to have. They can also explain what the results might mean.   Answering questions - The physical is a good time to ask the doctor or nurse questions about your health. If needed, they can refer you to other doctors or specialists, too.  Adults older than 65 years often need other care, too. As you get older, your doctor will talk to you " about:   How to prevent falling at home   Hearing or vision tests   Memory testing   How to take your medicines safely   Making sure that you have the help and support you need at home  All topics are updated as new evidence becomes available and our peer review process is complete.  This topic retrieved from [employer] on: May 02, 2024.  Topic 686323 Version 1.0  Release: 32.4.3 - C32.122  © 2024 UpToDate, Inc. and/or its affiliates. All rights reserved.  Consumer Information Use and Disclaimer   Disclaimer: This generalized information is a limited summary of diagnosis, treatment, and/or medication information. It is not meant to be comprehensive and should be used as a tool to help the user understand and/or assess potential diagnostic and treatment options. It does NOT include all information about conditions, treatments, medications, side effects, or risks that may apply to a specific patient. It is not intended to be medical advice or a substitute for the medical advice, diagnosis, or treatment of a health care provider based on the health care provider's examination and assessment of a patient's specific and unique circumstances. Patients must speak with a health care provider for complete information about their health, medical questions, and treatment options, including any risks or benefits regarding use of medications. This information does not endorse any treatments or medications as safe, effective, or approved for treating a specific patient. UpToDate, Inc. and its affiliates disclaim any warranty or liability relating to this information or the use thereof.The use of this information is governed by the Terms of Use, available at https://www.wolterskluwer.com/en/know/clinical-effectiveness-terms. 2024© UpToDate, Inc. and its affiliates and/or licensors. All rights reserved.  Copyright   © 2024 UpToDate, Inc. and/or its affiliates. All rights reserved.

## 2025-04-15 NOTE — ASSESSMENT & PLAN NOTE
Has had MRI and ENT consultation without discovered source. She was recommended another procedure by ENT for further evaluation but she had declined. She is interested in seeing ENT again.    Orders:    Ambulatory Referral to Otolaryngology; Future

## 2025-04-16 DIAGNOSIS — F41.9 ANXIETY: ICD-10-CM

## 2025-04-16 DIAGNOSIS — R63.4 UNINTENTIONAL WEIGHT LOSS: ICD-10-CM

## 2025-04-16 LAB
ALBUMIN SERPL ELPH-MCNC: 4.25 G/DL (ref 3.2–5.1)
ALBUMIN SERPL ELPH-MCNC: 61.6 % (ref 48–70)
ALBUMIN UR ELPH-MCNC: 100 %
ALPHA1 GLOB MFR UR ELPH: 0 %
ALPHA1 GLOB SERPL ELPH-MCNC: 0.26 G/DL (ref 0.15–0.47)
ALPHA1 GLOB SERPL ELPH-MCNC: 3.8 % (ref 1.8–7)
ALPHA2 GLOB MFR UR ELPH: 0 %
ALPHA2 GLOB SERPL ELPH-MCNC: 0.74 G/DL (ref 0.42–1.04)
ALPHA2 GLOB SERPL ELPH-MCNC: 10.7 % (ref 5.9–14.9)
B-GLOBULIN MFR UR ELPH: 0 %
BETA GLOB ABNORMAL SERPL ELPH-MCNC: 0.36 G/DL (ref 0.31–0.57)
BETA1 GLOB SERPL ELPH-MCNC: 5.2 % (ref 4.7–7.7)
BETA2 GLOB SERPL ELPH-MCNC: 4.6 % (ref 3.1–7.9)
BETA2+GAMMA GLOB SERPL ELPH-MCNC: 0.32 G/DL (ref 0.2–0.58)
GAMMA GLOB ABNORMAL SERPL ELPH-MCNC: 0.97 G/DL (ref 0.4–1.66)
GAMMA GLOB MFR UR ELPH: 0 %
GAMMA GLOB SERPL ELPH-MCNC: 14.1 % (ref 6.9–22.3)
IGG/ALB SER: 1.6 {RATIO} (ref 1.1–1.8)
PROT SERPL-MCNC: 6.9 G/DL (ref 6.4–8.4)
PROT UR-MCNC: 7.3 MG/DL

## 2025-04-16 PROCEDURE — 84165 PROTEIN E-PHORESIS SERUM: CPT | Performed by: PATHOLOGY

## 2025-04-16 PROCEDURE — 84166 PROTEIN E-PHORESIS/URINE/CSF: CPT | Performed by: PATHOLOGY

## 2025-04-16 RX ORDER — DRONABINOL 2.5 MG/1
2.5 CAPSULE ORAL
Qty: 60 CAPSULE | Refills: 0 | Status: SHIPPED | OUTPATIENT
Start: 2025-04-16 | End: 2025-04-17 | Stop reason: SDUPTHER

## 2025-04-16 NOTE — PROGRESS NOTES
[facility]        NAME: Gino Wells is a 46 y.o. female  : 1970    MRN: [id number]  DATE: 2023  TIME: 12:39 PM    Assessment and Plan   Pain in both lower extremities [M79.604, M79.605]  1. Pain in both lower extremities  ibuprofen (MOTRIN) 200 mg tablet            Patient Instructions       Keep your appointment with your primary care provider in January  Ibuprofen 200 mg 1 to 2 tablet every 6 hours as needed for pain    Proceed to  ER if symptoms worsen. Chief Complaint     Chief Complaint   Patient presents with    Cramping     Pt states she has had muscle pain in her b/l legs for over 2 years. Pain is periodic, however this time the pain impacts walking and pain is lingering longer than normal.          History of Present Illness       47 yo female with c/o lower extremity pain intermittent for 2 years. Was seen at the ED 2 years ago however did not followed with PCP. Sx subsided. Today reporting a repeat for 2 weeks. Pain is aching, through out her lowe extremities. Not alleviate with warm bath, stretches, denies aggravating factors such as, walking. Constant. 7/10. Denies injury, trauma, or surgery. Denies new meds, vitamins, new natural regimens as per patient. Review of Systems   Review of Systems   Constitutional:  Negative for activity change, appetite change and fever. HENT:  Negative for congestion and sore throat. Respiratory:  Negative for cough. Gastrointestinal:  Negative for anal bleeding, blood in stool, diarrhea, nausea and vomiting. Musculoskeletal:  Negative for neck pain and neck stiffness. Skin:  Negative for color change and rash. Neurological:  Negative for dizziness, light-headedness and headaches.          Current Medications       Current Outpatient Medications:     acyclovir (ZOVIRAX) 400 MG tablet, TAKE 1 TABLET BY MOUTH TWICE A DAY, Disp: 180 tablet, Rfl: 3    acyclovir (ZOVIRAX) 5 % ointment, APPLY TOPICALLY 4 TIMES A DAY AS Medication: Vyvanse  Last office visit date: 8/2/24  Medication Refill Protocol Failed.  Failed criteria: controlled substance. Sent to clinician to review.      NEEDED., Disp: 30 g, Rfl: 0    amLODIPine (NORVASC) 5 mg tablet, Take 5 mg by mouth daily, Disp: , Rfl: 0    atorvastatin (LIPITOR) 20 mg tablet, TAKE 1 TABLET BY MOUTH EVERY DAY, Disp: 90 tablet, Rfl: 0    clonazePAM (KlonoPIN) 1 mg tablet, Take 1 mg by mouth 3 (three) times a day, Disp: , Rfl: 0    ibuprofen (MOTRIN) 200 mg tablet, Take 2 tablets (400 mg total) by mouth every 6 (six) hours as needed for mild pain, Disp: 30 tablet, Rfl: 0    NON FORMULARY, Medical Marijuana, Disp: , Rfl:     traZODone (DESYREL) 100 mg tablet, Take 50 mg by mouth daily at bedtime as needed  , Disp: , Rfl:     methocarbamol (ROBAXIN) 500 mg tablet, Take 1 tablet (500 mg total) by mouth 2 (two) times a day (Patient not taking: Reported on 2023), Disp: 20 tablet, Rfl: 0    WHEAT BRAN PO, Take by mouth Wheat grass herbal supplement (Patient not taking: Reported on 2023), Disp: , Rfl:     Current Allergies     Allergies as of 2023 - Reviewed 2023   Allergen Reaction Noted    Lisinopril Angioedema 2018            The following portions of the patient's history were reviewed and updated as appropriate: allergies, current medications, past family history, past medical history, past social history, past surgical history and problem list.     Past Medical History:   Diagnosis Date    Abnormal Pap smear of cervix     Acute low back pain 2021    Anxiety     Bradycardia     Colon polyp     Depression     Gastric ulcer     Hernia     Hypertension 2006    Lactose intolerance     MDD (major depressive disorder)     PTSD (post-traumatic stress disorder)        Past Surgical History:   Procedure Laterality Date    BREAST BIOPSY  1987    BREAST CYST EXCISION Left 1984    benign    CERVICAL BIOPSY  W/ LOOP ELECTRODE EXCISION       SECTION  2013    HYSTERECTOMY  2008    Partial    OOPHORECTOMY Bilateral 2015    PARTIAL HYSTERECTOMY  2014    supracervical hysterectomy       Family History   Problem Relation Age of Onset    Diabetes Mother     Lung cancer Father         non smoker    Colon polyps Father     Cancer Father     Pancreatic cancer Maternal Grandmother     Heart disease Paternal Grandmother     Hypertension Paternal Grandmother     Breast cancer Maternal Aunt 39    Uterine cancer Maternal Aunt     Breast cancer Maternal Aunt 46    Stroke Sister     No Known Problems Brother     Other Sister         Hydrocephalus    Colon cancer Neg Hx     Ovarian cancer Neg Hx     Cervical cancer Neg Hx          Medications have been verified. Objective   Pulse 55   Temp 97.5 °F (36.4 °C)   Resp 18   Wt 69.4 kg (153 lb)   SpO2 100%   BMI 24.69 kg/m²        Physical Exam     Physical Exam  Vitals and nursing note reviewed. Constitutional:       General: She is not in acute distress. Appearance: Normal appearance. She is not ill-appearing. Eyes:      General: No scleral icterus. Extraocular Movements: Extraocular movements intact. Conjunctiva/sclera: Conjunctivae normal.      Pupils: Pupils are equal, round, and reactive to light. Cardiovascular:      Rate and Rhythm: Normal rate and regular rhythm. Pulses: Normal pulses. Heart sounds: Normal heart sounds. Pulmonary:      Effort: Pulmonary effort is normal. No respiratory distress. Breath sounds: Normal breath sounds. Musculoskeletal:         General: Tenderness present. No swelling or signs of injury. Normal range of motion. Cervical back: Normal range of motion and neck supple. No tenderness. Right lower leg: No edema. Left lower leg: No edema. Lymphadenopathy:      Cervical: No cervical adenopathy. Skin:     General: Skin is warm and dry. Findings: No bruising, erythema, lesion or rash. Neurological:      General: No focal deficit present. Mental Status: She is alert and oriented to person, place, and time. Motor: No weakness.       Coordination: Coordination normal.   Psychiatric: Mood and Affect: Mood normal.         Behavior: Behavior normal.

## 2025-04-16 NOTE — TELEPHONE ENCOUNTER
Triage nurse called the office for further assistance, on this new med issue and timely matter. Office staff stated the provider isn't available to push this med through to different pharmacy but they will find someone to cover and it can't be done by 11am. Sorry for the inconvenience.

## 2025-04-16 NOTE — TELEPHONE ENCOUNTER
Regarding: Pharmacy Change  ----- Message from Bita JOSEPH sent at 4/16/2025 10:44 AM EDT -----  Patient's script for dronabinol 2.5 mg is not able to be filled at [facility] regular [facility] as it is not available.  Please re-direct the script to [facility], [place], [address] as soon as possible.  Patient is headed to that pharmacy now, and only has car until 11 am today, 4/16/25.

## 2025-04-17 ENCOUNTER — RESULTS FOLLOW-UP (OUTPATIENT)
Age: 55
End: 2025-04-17

## 2025-04-17 DIAGNOSIS — E78.2 MIXED HYPERLIPIDEMIA: ICD-10-CM

## 2025-04-17 DIAGNOSIS — B00.9 HSV (HERPES SIMPLEX VIRUS) INFECTION: ICD-10-CM

## 2025-04-17 DIAGNOSIS — F41.9 ANXIETY: ICD-10-CM

## 2025-04-17 DIAGNOSIS — R63.4 UNINTENTIONAL WEIGHT LOSS: ICD-10-CM

## 2025-04-17 RX ORDER — DRONABINOL 2.5 MG/1
2.5 CAPSULE ORAL
Qty: 60 CAPSULE | Refills: 0 | Status: SHIPPED | OUTPATIENT
Start: 2025-04-17

## 2025-04-17 RX ORDER — ATORVASTATIN CALCIUM 40 MG/1
40 TABLET, FILM COATED ORAL DAILY
Qty: 90 TABLET | Refills: 1 | Status: SHIPPED | OUTPATIENT
Start: 2025-04-17

## 2025-04-17 RX ORDER — ACYCLOVIR 50 MG/G
OINTMENT TOPICAL
Qty: 30 G | Refills: 0 | OUTPATIENT
Start: 2025-04-17

## 2025-04-17 NOTE — TELEPHONE ENCOUNTER
Patient was seen yesterday and  dronabinol (MARINOL) 2.5 mg capsule was prescribed.  The pharmacy is stating they did not receive it.  Can the prescription be re faxed to     [facility]/pharmacy #[id number] - [place] - [address].  Phone: [phone]  Fax: [phone]     Please advise and notify patient it was sent.    Thank you.

## 2025-04-17 NOTE — TELEPHONE ENCOUNTER
----- Message from Og Barker MD sent at 4/17/2025 11:21 AM EDT -----  Please let patient know that her labs showed her LDL is very high. Has she been taking her atorvastatin regularly? If she has not, then encourage to take daily. If she has, then recommend to increase to 40 mg every day, which I can send if agreeable.

## 2025-04-18 NOTE — TELEPHONE ENCOUNTER
----- Message from Og Barker MD sent at 4/17/2025  3:05 PM EDT -----  Rx sent. Repeat lipid panel in 3 months

## 2025-04-25 ENCOUNTER — NURSE TRIAGE (OUTPATIENT)
Age: 55
End: 2025-04-25

## 2025-04-25 NOTE — TELEPHONE ENCOUNTER
Regarding: Pharmacy transfer/change  ----- Message from Jennifer DANIELS sent at 4/25/2025  5:29 PM EDT -----  Patient called, requested prescription for dronabinol (MARINOL) 2.5 mg capsule to be sent [facility]/pharmacy #[id number] - [place] - [address].   Please advise, thank you.

## 2025-04-25 NOTE — TELEPHONE ENCOUNTER
"FOLLOW UP: please re-send Rx dronabinol (MARINOL) 2.5 mg to requested pharmacy [facility]/pharmacy #[id number] - [place], [place] [address] [address] [facility] [address]. As its a controlled substances and unable to re-send per Refill protocol. Needs to be done by provider     REASON FOR CONVERSATION: Medication Problem    SYMPTOMS: n/a    OTHER: n/a    DISPOSITION: No disposition on file.        Reason for Disposition   [1] Prescription prescribed recently is not at pharmacy AND [2] triager has access to patient's EMR AND [3] prescription is recorded in the EMR    Answer Assessment - Initial Assessment Questions  1. NAME of MEDICINE: \"What medicine(s) are you calling about?\"      dronabinol (MARINOL) 2.5 mg capsule    2. QUESTION: \"What is your question?\" (e.g., double dose of medicine, side effect)       PRESCRIPTION to be sent to [facility]/pharmacy #[id number] - [place], [address].   3. PRESCRIBER: \"Who prescribed the medicine?\" Reason: if prescribed by specialist, call should be referred to that group.     Og Barker MD    Protocols used: Medication Question Call-Adult-    "

## 2025-04-28 DIAGNOSIS — F41.9 ANXIETY: ICD-10-CM

## 2025-04-28 DIAGNOSIS — R63.4 UNINTENTIONAL WEIGHT LOSS: ICD-10-CM

## 2025-04-28 RX ORDER — DRONABINOL 2.5 MG/1
2.5 CAPSULE ORAL
Qty: 60 CAPSULE | Refills: 0 | Status: SHIPPED | OUTPATIENT
Start: 2025-04-28

## 2025-05-01 ENCOUNTER — HOSPITAL ENCOUNTER (OUTPATIENT)
Age: 55
Discharge: HOME | End: 2025-05-01
Attending: STUDENT IN AN ORGANIZED HEALTH CARE EDUCATION/TRAINING PROGRAM
Payer: COMMERCIAL

## 2025-05-01 VITALS — BODY MASS INDEX: 19.29 KG/M2 | HEIGHT: 66 IN | WEIGHT: 120 LBS

## 2025-05-01 DIAGNOSIS — Z12.31 ENCOUNTER FOR SCREENING MAMMOGRAM FOR BREAST CANCER: ICD-10-CM

## 2025-05-01 PROCEDURE — 77063 BREAST TOMOSYNTHESIS BI: CPT

## 2025-05-01 PROCEDURE — 77067 SCR MAMMO BI INCL CAD: CPT

## 2025-05-06 ENCOUNTER — HOSPITAL ENCOUNTER (OUTPATIENT)
Dept: CT IMAGING | Facility: HOSPITAL | Age: 55
Discharge: HOME | End: 2025-05-06
Attending: STUDENT IN AN ORGANIZED HEALTH CARE EDUCATION/TRAINING PROGRAM
Payer: COMMERCIAL

## 2025-05-06 DIAGNOSIS — R63.4 UNINTENTIONAL WEIGHT LOSS: ICD-10-CM

## 2025-05-06 PROCEDURE — 71260 CT THORAX DX C+: CPT

## 2025-05-06 PROCEDURE — 74177 CT ABD & PELVIS W/CONTRAST: CPT

## 2025-05-06 RX ADMIN — IOHEXOL 100 ML: 350 INJECTION, SOLUTION INTRAVENOUS at 07:14

## 2025-05-13 ENCOUNTER — TELEPHONE (OUTPATIENT)
Dept: GASTROENTEROLOGY | Facility: CLINIC | Age: 55
End: 2025-05-13

## 2025-05-29 ENCOUNTER — TELEPHONE (OUTPATIENT)
Dept: MAMMOGRAPHY | Facility: CLINIC | Age: 55
End: 2025-05-29

## 2025-05-29 DIAGNOSIS — R63.4 UNINTENTIONAL WEIGHT LOSS: ICD-10-CM

## 2025-05-29 DIAGNOSIS — F41.9 ANXIETY: ICD-10-CM

## 2025-05-29 RX ORDER — DRONABINOL 2.5 MG/1
2.5 CAPSULE ORAL
Qty: 60 CAPSULE | Refills: 0 | Status: SHIPPED | OUTPATIENT
Start: 2025-05-29

## 2025-06-03 NOTE — PROGRESS NOTES
Name: Christina Andrade      : 1970      MRN: [id number]  Encounter Provider: Yaneth Adams PA-C  Encounter Date: 2025   Encounter department: [facility] [place]  :  Assessment & Plan  Screening for cervical cancer    Orders:    Liquid-based pap, screening    Encounter for annual routine gynecological examination     Calcium 1200-1500mg (in divided doses-max 600 mg at one time) + 600-1000 IU Vit D daily.   Exercise 150-300 minutes per week minimum including weight bearing exercises.   Pap with high risk HPV Q 5 years, if normal.    Call your insurance company to verify coverage prior to completing any ordered tests.   monthly breast self exam recommended.    Colonoscopy-          Kegels 20 times twice daily.   Silicone based lubricant or coconut oil with sex. (Use water based lubricant with condoms or sexual toys.)  Vaginal moisturizers twice weekly as needed.   Return to office in one year or sooner, if needed.      Screening for STD (sexually transmitted disease)    Orders:    HIV 1/2 AG/AB w Reflex [facility] for 2 yr old and above    Hepatitis B surface antigen; Future    Hepatitis C antibody; Future    RPR-Syphilis Screening (Total Syphilis IGG/IGM)    Chlamydia/GC amplified DNA by PCR        History of Present Illness   HPI  Christina Andrade is a 55 y.o. female who presents for routine yearly examination        Menopause 39 yrs.  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    Hereditary Cancer Screening  FH Breast cancer: Maternal Aunt x 2   FH Ovarian cancer: denies  FH Uterine cancer: denies  FH Colon cancer: denies  Cancer-related family history includes Breast cancer (age of onset: 45) in her maternal aunt; Breast cancer (age of onset: 52) in her maternal aunt; Cancer in her father; Lung cancer in her father; Pancreatic cancer in her maternal grandmother; Uterine cancer in her maternal aunt. There is no history of Colon cancer, Ovarian cancer, or Cervical cancer.      Review of Systems   Constitutional:  Negative for chills, fatigue and fever.   Gastrointestinal:  Negative for abdominal pain, anal bleeding, blood in stool, constipation, diarrhea, nausea and vomiting.   Genitourinary:  Negative for difficulty urinating, dyspareunia, dysuria, frequency, hematuria, pelvic pain, urgency, vaginal bleeding, vaginal discharge and vaginal pain.   Neurological:  Negative for dizziness, syncope, light-headedness and headaches.     Medications Ordered Prior to Encounter[1]   Social History[2]     Objective   /80 (BP Location: Left arm, Patient Position: Sitting, Cuff Size: Standard)   Wt 53.2 kg (117 lb 3.2 oz)   LMP 02/01/2016   BMI 18.92 kg/m²      Physical Exam  Vitals and nursing note reviewed.   Constitutional:       General: She is not in acute distress.     Appearance: She is well-developed.   HENT:      Head: Normocephalic and atraumatic.     Eyes:      Extraocular Movements: Extraocular movements intact.     Pulmonary:      Effort: Pulmonary effort is normal.   Chest:   Breasts:     Right: No swelling, bleeding, inverted nipple, mass, nipple discharge, skin change or tenderness.      Left: No swelling, bleeding, inverted nipple, mass, nipple discharge, skin change or tenderness.   Abdominal:      Palpations: Abdomen is soft.       Tenderness: There is no abdominal tenderness.      Hernia: There is no hernia in the left inguinal area or right inguinal area.   Genitourinary:     General: Normal vulva.      Exam position: Lithotomy position.      Pubic Area: No rash.       Labia:         Right: No rash, tenderness or lesion.         Left: No rash, tenderness or lesion.       Urethra: No urethral pain or urethral swelling.      Vagina: No vaginal discharge, erythema, tenderness, bleeding or lesions.      Cervix: No cervical motion tenderness, discharge, friability, lesion, erythema or cervical bleeding.      Adnexa:         Right: No tenderness.          Left: No tenderness.        Comments: Uterus and ovaries surgically absent  Lymphadenopathy:      Upper Body:      Right upper body: No supraclavicular, axillary or pectoral adenopathy.      Left upper body: No supraclavicular, axillary or pectoral adenopathy.      Lower Body: No right inguinal adenopathy. No left inguinal adenopathy.     Skin:     General: Skin is warm and dry.     Neurological:      Mental Status: She is alert.     Psychiatric:         Mood and Affect: Mood normal.         Behavior: Behavior normal.         Yaneth Adams PA-C         [1]   Current Outpatient Medications on File Prior to Visit   Medication Sig Dispense Refill    acyclovir (ZOVIRAX) 5 % ointment APPLY TOPICALLY 4 TIMES A DAY AS NEEDED. 30 g 0    atorvastatin (LIPITOR) 40 mg tablet Take 1 tablet (40 mg total) by mouth daily 90 tablet 1    clonazePAM (KlonoPIN) 1 mg tablet Take 1 mg by mouth in the morning. Taking half pills a day .  0    dronabinol (MARINOL) 2.5 mg capsule Take 1 capsule (2.5 mg total) by mouth 2 (two) times a day before meals 60 capsule 0    NON FORMULARY Medical Marijuana      traZODone (DESYREL) 100 mg tablet Take 50 mg by mouth daily at bedtime as needed      acyclovir (ZOVIRAX) 400 MG tablet TAKE 1 TABLET BY MOUTH TWICE A  tablet 1     No current facility-administered medications on  file prior to visit.   [2]   Social History  Tobacco Use    Smoking status: Never    Smokeless tobacco: Never    Tobacco comments:     Medical marijuana   Vaping Use    Vaping status: Never Used   Substance and Sexual Activity    Alcohol use: Yes     Alcohol/week: 1.0 standard drink of alcohol     Types: 1 Glasses of wine per week    Drug use: Yes     Types: Marijuana     Comment: Medical Marijuana Card    Sexual activity: Yes     Partners: Male     Birth control/protection: Surgical, Female Sterilization

## 2025-06-04 ENCOUNTER — ANNUAL EXAM (OUTPATIENT)
Age: 55
End: 2025-06-04
Payer: COMMERCIAL

## 2025-06-04 ENCOUNTER — APPOINTMENT (OUTPATIENT)
Age: 55
End: 2025-06-04
Payer: COMMERCIAL

## 2025-06-04 VITALS — WEIGHT: 117.2 LBS | DIASTOLIC BLOOD PRESSURE: 80 MMHG | BODY MASS INDEX: 18.92 KG/M2 | SYSTOLIC BLOOD PRESSURE: 118 MMHG

## 2025-06-04 DIAGNOSIS — Z11.3 SCREENING FOR STD (SEXUALLY TRANSMITTED DISEASE): ICD-10-CM

## 2025-06-04 DIAGNOSIS — Z12.4 SCREENING FOR CERVICAL CANCER: ICD-10-CM

## 2025-06-04 DIAGNOSIS — Z01.419 ENCOUNTER FOR ANNUAL ROUTINE GYNECOLOGICAL EXAMINATION: ICD-10-CM

## 2025-06-04 PROCEDURE — 87389 HIV-1 AG W/HIV-1&-2 AB AG IA: CPT

## 2025-06-04 PROCEDURE — 86803 HEPATITIS C AB TEST: CPT

## 2025-06-04 PROCEDURE — G0476 HPV COMBO ASSAY CA SCREEN: HCPCS

## 2025-06-04 PROCEDURE — 87340 HEPATITIS B SURFACE AG IA: CPT

## 2025-06-04 PROCEDURE — 87491 CHLMYD TRACH DNA AMP PROBE: CPT

## 2025-06-04 PROCEDURE — 99396 PREV VISIT EST AGE 40-64: CPT

## 2025-06-04 PROCEDURE — 36415 COLL VENOUS BLD VENIPUNCTURE: CPT

## 2025-06-04 PROCEDURE — 87591 N.GONORRHOEAE DNA AMP PROB: CPT

## 2025-06-04 PROCEDURE — 86780 TREPONEMA PALLIDUM: CPT

## 2025-06-04 PROCEDURE — G0145 SCR C/V CYTO,THINLAYER,RESCR: HCPCS

## 2025-06-05 ENCOUNTER — RESULTS FOLLOW-UP (OUTPATIENT)
Dept: OBGYN CLINIC | Facility: CLINIC | Age: 55
End: 2025-06-05

## 2025-06-05 LAB
HBV SURFACE AG SER QL: NORMAL
HCV AB SER QL: NORMAL
HIV 1+2 AB+HIV1 P24 AG SERPL QL IA: NORMAL
HPV HR 12 DNA CVX QL NAA+PROBE: NEGATIVE
HPV16 DNA CVX QL NAA+PROBE: NEGATIVE
HPV18 DNA CVX QL NAA+PROBE: NEGATIVE
TREPONEMA PALLIDUM IGG+IGM AB [PRESENCE] IN SERUM OR PLASMA BY IMMUNOASSAY: NORMAL

## 2025-06-06 LAB
C TRACH DNA SPEC QL NAA+PROBE: NEGATIVE
N GONORRHOEA DNA SPEC QL NAA+PROBE: NEGATIVE

## 2025-06-09 LAB
LAB AP GYN PRIMARY INTERPRETATION: NORMAL
Lab: NORMAL